# Patient Record
Sex: MALE | Race: WHITE | NOT HISPANIC OR LATINO | Employment: OTHER | URBAN - METROPOLITAN AREA
[De-identification: names, ages, dates, MRNs, and addresses within clinical notes are randomized per-mention and may not be internally consistent; named-entity substitution may affect disease eponyms.]

---

## 2017-01-05 ENCOUNTER — OFFICE VISIT (OUTPATIENT)
Dept: CARDIOLOGY | Facility: CLINIC | Age: 82
End: 2017-01-05
Payer: MEDICARE

## 2017-01-05 VITALS
WEIGHT: 216.63 LBS | HEART RATE: 72 BPM | HEIGHT: 71 IN | BODY MASS INDEX: 30.33 KG/M2 | SYSTOLIC BLOOD PRESSURE: 104 MMHG | DIASTOLIC BLOOD PRESSURE: 60 MMHG

## 2017-01-05 DIAGNOSIS — I50.22 CHRONIC SYSTOLIC CHF (CONGESTIVE HEART FAILURE): ICD-10-CM

## 2017-01-05 DIAGNOSIS — I20.9 ISCHEMIC CHEST PAIN: ICD-10-CM

## 2017-01-05 DIAGNOSIS — I25.118 CORONARY ARTERY DISEASE OF NATIVE ARTERY OF NATIVE HEART WITH STABLE ANGINA PECTORIS: ICD-10-CM

## 2017-01-05 DIAGNOSIS — I42.9 CARDIOMYOPATHY: ICD-10-CM

## 2017-01-05 DIAGNOSIS — I10 ESSENTIAL HYPERTENSION: ICD-10-CM

## 2017-01-05 DIAGNOSIS — I35.0 NONRHEUMATIC AORTIC VALVE STENOSIS: ICD-10-CM

## 2017-01-05 DIAGNOSIS — I47.29 NSVT (NONSUSTAINED VENTRICULAR TACHYCARDIA): Primary | ICD-10-CM

## 2017-01-05 DIAGNOSIS — N18.30 CKD (CHRONIC KIDNEY DISEASE) STAGE 3, GFR 30-59 ML/MIN: ICD-10-CM

## 2017-01-05 PROCEDURE — 1126F AMNT PAIN NOTED NONE PRSNT: CPT | Mod: S$GLB,,, | Performed by: PHYSICIAN ASSISTANT

## 2017-01-05 PROCEDURE — 1160F RVW MEDS BY RX/DR IN RCRD: CPT | Mod: S$GLB,,, | Performed by: PHYSICIAN ASSISTANT

## 2017-01-05 PROCEDURE — 99214 OFFICE O/P EST MOD 30 MIN: CPT | Mod: S$GLB,,, | Performed by: PHYSICIAN ASSISTANT

## 2017-01-05 PROCEDURE — 1157F ADVNC CARE PLAN IN RCRD: CPT | Mod: S$GLB,,, | Performed by: PHYSICIAN ASSISTANT

## 2017-01-05 PROCEDURE — 1159F MED LIST DOCD IN RCRD: CPT | Mod: S$GLB,,, | Performed by: PHYSICIAN ASSISTANT

## 2017-01-05 PROCEDURE — 99999 PR PBB SHADOW E&M-EST. PATIENT-LVL IV: CPT | Mod: PBBFAC,,, | Performed by: PHYSICIAN ASSISTANT

## 2017-01-05 RX ORDER — BUMETANIDE 2 MG/1
2 TABLET ORAL DAILY
Qty: 60 TABLET | Refills: 6 | Status: ON HOLD | OUTPATIENT
Start: 2017-01-05 | End: 2017-06-30

## 2017-01-05 RX ORDER — ATORVASTATIN CALCIUM 20 MG/1
20 TABLET, FILM COATED ORAL NIGHTLY
Qty: 30 TABLET | Refills: 6 | Status: SHIPPED | OUTPATIENT
Start: 2017-01-05 | End: 2018-01-30 | Stop reason: SDUPTHER

## 2017-01-05 NOTE — MR AVS SNAPSHOT
[facility]  [address] [facility] [address]  Phone: [phone]  Fax: [phone]                  Rajiv Russo   2017 8:00 AM   Office Visit    Description:  Male : 3/15/1919   Provider:  EDITH Dao   Department:  [facility]           Reason for Visit     Coronary Artery Disease     Congestive Heart Failure           Diagnoses this Visit        Comments    NSVT (nonsustained ventricular tachycardia)    -  Primary     Ischemic chest pain         Coronary artery disease of native artery of native heart with stable angina pectoris         Cardiomyopathy         Chronic systolic CHF (congestive heart failure)         Essential hypertension         Nonrheumatic aortic valve stenosis         CKD (chronic kidney disease) stage 3, GFR 30-59 ml/min                To Do List           Future Appointments        Provider Department Dept Phone    2017 10:00 AM SAVANNA Lael MD [facility] Op[facility]mology [phone]    2017 10:30 AM Bertram Estevez MD [facility] [phone]      Goals (5 Years of Data)     None       These Medications        Disp Refills Start End    bumetanide (BUMEX) 2 MG tablet 60 tablet 6 2017     Take 1 tablet (2 mg total) by mouth once daily. - Oral    Pharmacy: [facility] [address] [facility] Ph #: [phone]       atorvastatin (LIPITOR) 20 MG tablet 30 tablet 6 2017    Take 1 tablet (20 mg total) by mouth every evening. - Oral    Pharmacy: [facility] [address] [facility] Ph #: [phone]         [facility] On [facility]     [facility] -  Assistance  Registered nurses in the [facility] provide clinical advisement, health education, appointment booking, and other advisory services.  Call for this free service at [phone].             Medications           Message regarding Medications     Verify the changes and/or  "additions to your medication regime listed below are the same as discussed with your clinician today.  If any of these changes or additions are incorrect, please notify your healthcare provider.        CHANGE how you are taking these medications     Start Taking Instead of    bumetanide (BUMEX) 2 MG tablet bumetanide (BUMEX) 2 MG tablet    Dosage:  Take 1 tablet (2 mg total) by mouth once daily. Dosage:  Take 2 mg by mouth once daily.    Reason for Change:  Reorder            Verify that the below list of medications is an accurate representation of the medications you are currently taking.  If none reported, the list may be blank. If incorrect, please contact your healthcare provider. Carry this list with you in case of emergency.           Current Medications     aspirin (ECOTRIN) 81 MG EC tablet Take 81 mg by mouth once daily.    atorvastatin (LIPITOR) 20 MG tablet Take 1 tablet (20 mg total) by mouth every evening.    bumetanide (BUMEX) 2 MG tablet Take 1 tablet (2 mg total) by mouth once daily.    cyanocobalamin 1,000 mcg/mL injection     fenofibrate 160 MG Tab Take 160 mg by mouth once daily.    glipiZIDE (GLUCOTROL) 2.5 MG TR24 Take 2.5 mg by mouth 2 (two) times daily with meals.     insulin glargine (LANTUS) 100 unit/mL injection Inject 15 Units into the skin 2 (two) times daily.     losartan (COZAAR) 25 MG tablet Take 0.5 tablets (12.5 mg total) by mouth once daily.    metoprolol succinate (TOPROL-XL) 25 MG 24 hr tablet Take 1 tablet (25 mg total) by mouth every evening.    nitroGLYCERIN (NITROSTAT) 0.4 MG SL tablet Place 1 tablet (0.4 mg total) under the tongue every 5 (five) minutes as needed for Chest pain.    potassium chloride SA (K-DUR,KLOR-CON) 10 MEQ tablet Take 10 mEq by mouth once.    tamsulosin (FLOMAX) 0.4 mg Cp24 Take 0.4 mg by mouth.    amiodarone (PACERONE) 200 MG Tab Take 1 tablet (200 mg total) by mouth 2 (two) times daily.    BD INSULIN PEN NEEDLE UF MINI 31 gauge x 3/16" Ndle          " "  Clinical Reference Information           Vital Signs - Last Recorded  Most recent update: 1/5/2017  8:13 AM by Giovany Staples    BP Pulse Ht Wt BMI    104/60 (BP Location: Left arm, Patient Position: Sitting, BP Method: Manual) 72 5' 11" (1.803 m) 98.2 kg (216 lb 9.6 oz) 30.21 kg/m2      Blood Pressure          Most Recent Value    BP  104/60      Allergies as of 1/5/2017     No Known Allergies      Immunizations Administered on Date of Encounter - 1/5/2017     None      Orders Placed During Today's Visit     Future Labs/Procedures Expected by Expires    Basic metabolic panel  4/5/2017 3/6/2018      "

## 2017-01-05 NOTE — PROGRESS NOTES
Subjective:    Patient ID:  Rajiv Russo is a 89 y.o. male who presents for follow-up of Coronary Artery Disease (eval amiodarone levels) and Congestive Heart Failure      HPI   Mr. Russo is a 89 year old patient with a PMHx of type II DM, CAD, old MI, bradycardia s/p PPM, CKD stage III, bladder, and prostate CA who presents today for follow-up. Patient previously seen by Dr. Sanchez and complained of a near syncopal episode. PPM interrogation showed run of NSVT and patient was subsequently started on amiodarone. He returns today and states he is doing well. No real complaints. Still having occasional angina, no recent episodes. No recent exertional signs/syptoms. Last sublingual nitro usage approximately one month ago. CHF wise, patient appears stable. No issues. Denies any worsening SOB, PND, orthopnea, or abdominal bloating. Lower extremity edema is stable. Patient reports compliance with his medications. He did not start amiodarone due to concern over side effects. No further near syncopal episodes.      As referenced in previous notes, patient and family have elected conservative management of AS/angina. For now, he is still on board with conservative management.     Review of Systems   Constitution: Negative for chills, decreased appetite, fever, weakness and malaise/fatigue.   HENT: Negative for congestion, headaches, hoarse voice and sore throat.    Eyes: Negative for blurred vision and discharge.   Cardiovascular: Positive for chest pain (stable angina) and leg swelling. Negative for claudication, cyanosis, dyspnea on exertion, irregular heartbeat, near-syncope, orthopnea, palpitations and paroxysmal nocturnal dyspnea.   Respiratory: Negative for cough, hemoptysis, shortness of breath, snoring, sputum production and wheezing.    Endocrine: Negative for cold intolerance and heat intolerance.   Hematologic/Lymphatic: Negative for bleeding problem. Does not bruise/bleed easily.   Skin: Negative for rash.  "  Musculoskeletal: Positive for arthritis and joint pain. Negative for back pain, joint swelling, muscle cramps, muscle weakness and myalgias.   Gastrointestinal: Negative for abdominal pain, constipation, diarrhea, heartburn, melena and nausea.   Genitourinary: Negative for hematuria.   Neurological: Negative for dizziness, focal weakness, light-headedness, loss of balance, numbness, paresthesias and seizures.   Psychiatric/Behavioral: Negative for memory loss. The patient does not have insomnia.    Allergic/Immunologic: Negative for hives.         Visit Vitals    /60 (BP Location: Left arm, Patient Position: Sitting, BP Method: Manual)    Pulse 72    Ht 5' 11" (1.803 m)    Wt 98.2 kg (216 lb 9.6 oz)    BMI 30.21 kg/m2       Objective:    Physical Exam   Constitutional: He is oriented to person, place, and time. He appears well-developed and well-nourished. No distress.   HENT:   Head: Normocephalic and atraumatic.   Eyes: Pupils are equal, round, and reactive to light. Right eye exhibits no discharge. Left eye exhibits no discharge.   Neck: Neck supple. No JVD present. No tracheal deviation present. No thyromegaly present.   Cardiovascular: Normal rate, regular rhythm, S1 normal and S2 normal.  PMI is not displaced.  Exam reveals no gallop, no S3, no S4 and no friction rub.    Murmur heard.   Harsh midsystolic murmur is present  at the upper right sternal border radiating to the neck  Pulses:       Radial pulses are 2+ on the right side, and 2+ on the left side.   Pulmonary/Chest: Effort normal and breath sounds normal. No respiratory distress. He has no wheezes. He has no rales.   Abdominal: Soft. He exhibits no distension. There is no tenderness. There is no rebound.   Musculoskeletal: He exhibits edema (1+ BLE kaylie).   Neurological: He is alert and oriented to person, place, and time.   Skin: Skin is warm and dry. He is not diaphoretic. No erythema.   Psychiatric: He has a normal mood and affect. His " behavior is normal.   Nursing note and vitals reviewed.       2D Echo CONCLUSIONS     1 - Moderate left atrial enlargement.     2 - Concentric remodeling.     3 - Mildly to moderately depressed left ventricular systolic function (EF 40-45%).     4 - Normal right ventricular systolic function .     5 - The estimated PA systolic pressure is greater than 26 mmHg.     6 - Moderate to severe aortic stenosis, KASSIDY = 0.8 cm2, mean gradient = 18.0 mmHg.     7 - Trivial aortic regurgitation.     8 - Moderate left atrial enlargement.     9 - Concentric remodeling.     10 - Mildly to moderately depressed left ventricular systolic function (EF 40-45%).     11 - Normal right ventricular systolic function .     12 - The estimated PA systolic pressure is greater than 26 mmHg.     13 - Moderate to severe aortic stenosis, KASSIDY = 0.8 cm2, mean gradient = 18.0 mmHg.     14 - Trivial aortic regurgitation.       Chemistry        Component Value Date/Time     12/22/2016 1408    K 4.1 12/22/2016 1408     12/22/2016 1408    CO2 30 (H) 12/22/2016 1408    BUN 30 12/22/2016 1408    CREATININE 1.5 (H) 12/22/2016 1408     (H) 12/22/2016 1408        Component Value Date/Time    CALCIUM 9.3 12/22/2016 1408    ALKPHOS 46 (L) 08/04/2016 1046    AST 19 08/04/2016 1046    ALT 10 08/04/2016 1046    BILITOT 0.7 08/04/2016 1046          Assessment:       1. NSVT (nonsustained ventricular tachycardia)    2. Ischemic chest pain    3. Coronary artery disease of native artery of native heart with stable angina pectoris    4. Cardiomyopathy    5. Chronic systolic CHF (congestive heart failure)    6. Essential hypertension    7. Nonrheumatic aortic valve stenosis    8. CKD (chronic kidney disease) stage 3, GFR 30-59 ml/min       Doing clinically well. Does not want to take amiodarone, concerned about side effects. BP is borderline so increasing BB not an option at this point. For now, will continue to monitor patient on current regimen. He  will call if he experiences any other episodes. Continue same meds. Salt compliance emphasized.   Plan:     -Continue current medical management and risk factor modification  -Cardiac, low salt diet  -Notify office if any further near syncopal episodes  -RTC with PA in 3 months with BMP same day         Chart reviewed. Dr. Sanchez agrees with plan as outlined above.         Chart reviewed. Dr. Sanchez agrees with plan as outlined above.

## 2017-01-18 ENCOUNTER — TELEPHONE (OUTPATIENT)
Dept: NEPHROLOGY | Facility: CLINIC | Age: 82
End: 2017-01-18

## 2017-01-18 NOTE — TELEPHONE ENCOUNTER
----- Message from Rafaela Verdugo sent at 1/18/2017  1:36 PM CST -----  Contact: pt daughter-   Patient daughter calling to speak to  Nurse. States the patient is scheduled for an appt on 1/30 and wants to know if patient need lab work done before appt. Please adv/call [phone].//thanks. cw

## 2017-01-18 NOTE — TELEPHONE ENCOUNTER
Returned call to patient's daughter who wants to know does patient need lab work. Left a detailed message informing that he will need blood work the week before. The orders are in the system.

## 2017-01-18 NOTE — TELEPHONE ENCOUNTER
Patient's daughter Elena returned the call. She is requesting that patient have labs done by his home health. Asked her to provided the name of the company and fax or phone number. She states that she's not home and don't know either. Advised her to call back with it so that I can fax the orders over. She expressed understanding.

## 2017-01-25 ENCOUNTER — TELEPHONE (OUTPATIENT)
Dept: CARDIOLOGY | Facility: CLINIC | Age: 82
End: 2017-01-25

## 2017-01-25 NOTE — TELEPHONE ENCOUNTER
----- Message from EDITH Dao sent at 1/25/2017  7:43 AM CST -----  Yes, pulmonary would have to address or his primary care. We do not sign oxygen orders    Thanks

## 2017-03-09 ENCOUNTER — TELEPHONE (OUTPATIENT)
Dept: CARDIOLOGY | Facility: CLINIC | Age: 82
End: 2017-03-09

## 2017-03-09 NOTE — TELEPHONE ENCOUNTER
----- Message from Radha Zaragoza sent at 3/9/2017  1:23 PM CST -----  Contact: maty Pereira [phone]  States she is calling to schedule pt's pacemaker appt and can be reached at [phone]//[email or web address]

## 2017-03-14 ENCOUNTER — HOSPITAL ENCOUNTER (INPATIENT)
Facility: HOSPITAL | Age: 82
LOS: 1 days | Discharge: HOME | DRG: 603 | End: 2017-03-17
Attending: EMERGENCY MEDICINE | Admitting: INTERNAL MEDICINE
Payer: MEDICARE

## 2017-03-14 DIAGNOSIS — L03.90 CELLULITIS: ICD-10-CM

## 2017-03-14 DIAGNOSIS — I42.9 CARDIOMYOPATHY: ICD-10-CM

## 2017-03-14 DIAGNOSIS — L03.115 CELLULITIS OF RIGHT LOWER EXTREMITY: ICD-10-CM

## 2017-03-14 DIAGNOSIS — I95.9 HYPOTENSION: ICD-10-CM

## 2017-03-14 DIAGNOSIS — M79.604 LOWER EXTREMITY PAIN, RIGHT: ICD-10-CM

## 2017-03-14 DIAGNOSIS — R79.89 ELEVATED TROPONIN: Primary | ICD-10-CM

## 2017-03-14 PROBLEM — L03.119 CELLULITIS OF LEG: Status: ACTIVE | Noted: 2017-03-14

## 2017-03-14 LAB
ALBUMIN SERPL BCP-MCNC: 3.4 G/DL
ALP SERPL-CCNC: 44 U/L
ALT SERPL W/O P-5'-P-CCNC: 7 U/L
ANION GAP SERPL CALC-SCNC: 8 MMOL/L
ANION GAP SERPL CALC-SCNC: 8 MMOL/L
APTT BLDCRRT: 28.1 SEC
AST SERPL-CCNC: 15 U/L
BASOPHILS # BLD AUTO: 0.01 K/UL
BASOPHILS NFR BLD: 0.1 %
BILIRUB SERPL-MCNC: 0.6 MG/DL
BNP SERPL-MCNC: 656 PG/ML
BUN SERPL-MCNC: 28 MG/DL
BUN SERPL-MCNC: 29 MG/DL
CALCIUM SERPL-MCNC: 8.6 MG/DL
CALCIUM SERPL-MCNC: 8.7 MG/DL
CHLORIDE SERPL-SCNC: 100 MMOL/L
CHLORIDE SERPL-SCNC: 98 MMOL/L
CO2 SERPL-SCNC: 32 MMOL/L
CO2 SERPL-SCNC: 33 MMOL/L
CREAT SERPL-MCNC: 1.7 MG/DL
CREAT SERPL-MCNC: 1.9 MG/DL
DIFFERENTIAL METHOD: ABNORMAL
EOSINOPHIL # BLD AUTO: 0.1 K/UL
EOSINOPHIL NFR BLD: 0.5 %
ERYTHROCYTE [DISTWIDTH] IN BLOOD BY AUTOMATED COUNT: 14.3 %
EST. GFR  (AFRICAN AMERICAN): 33 ML/MIN/1.73 M^2
EST. GFR  (AFRICAN AMERICAN): 38 ML/MIN/1.73 M^2
EST. GFR  (NON AFRICAN AMERICAN): 29 ML/MIN/1.73 M^2
EST. GFR  (NON AFRICAN AMERICAN): 33 ML/MIN/1.73 M^2
GLUCOSE SERPL-MCNC: 236 MG/DL
GLUCOSE SERPL-MCNC: 311 MG/DL
HCT VFR BLD AUTO: 37.2 %
HGB BLD-MCNC: 11.8 G/DL
INR PPP: 1.1
LACTATE SERPL-SCNC: 1.7 MMOL/L
LYMPHOCYTES # BLD AUTO: 0.9 K/UL
LYMPHOCYTES NFR BLD: 9.4 %
MCH RBC QN AUTO: 28.9 PG
MCHC RBC AUTO-ENTMCNC: 31.7 %
MCV RBC AUTO: 91 FL
MONOCYTES # BLD AUTO: 0.9 K/UL
MONOCYTES NFR BLD: 9.9 %
NEUTROPHILS # BLD AUTO: 7.5 K/UL
NEUTROPHILS NFR BLD: 80.1 %
PLATELET # BLD AUTO: 175 K/UL
PMV BLD AUTO: 10.1 FL
POTASSIUM SERPL-SCNC: 3.7 MMOL/L
POTASSIUM SERPL-SCNC: 3.9 MMOL/L
PROT SERPL-MCNC: 6.7 G/DL
PROTHROMBIN TIME: 11.4 SEC
RBC # BLD AUTO: 4.08 M/UL
SODIUM SERPL-SCNC: 139 MMOL/L
SODIUM SERPL-SCNC: 140 MMOL/L
TROPONIN I SERPL DL<=0.01 NG/ML-MCNC: 0.51 NG/ML
TROPONIN I SERPL DL<=0.01 NG/ML-MCNC: 0.54 NG/ML
WBC # BLD AUTO: 9.36 K/UL

## 2017-03-14 PROCEDURE — 83605 ASSAY OF LACTIC ACID: CPT

## 2017-03-14 PROCEDURE — 96365 THER/PROPH/DIAG IV INF INIT: CPT

## 2017-03-14 PROCEDURE — 80053 COMPREHEN METABOLIC PANEL: CPT

## 2017-03-14 PROCEDURE — 99285 EMERGENCY DEPT VISIT HI MDM: CPT | Mod: 25

## 2017-03-14 PROCEDURE — 80048 BASIC METABOLIC PNL TOTAL CA: CPT

## 2017-03-14 PROCEDURE — 84484 ASSAY OF TROPONIN QUANT: CPT

## 2017-03-14 PROCEDURE — 87040 BLOOD CULTURE FOR BACTERIA: CPT | Mod: 59

## 2017-03-14 PROCEDURE — 85730 THROMBOPLASTIN TIME PARTIAL: CPT

## 2017-03-14 PROCEDURE — 96366 THER/PROPH/DIAG IV INF ADDON: CPT

## 2017-03-14 PROCEDURE — 94640 AIRWAY INHALATION TREATMENT: CPT

## 2017-03-14 PROCEDURE — 25000003 PHARM REV CODE 250: Performed by: EMERGENCY MEDICINE

## 2017-03-14 PROCEDURE — G0378 HOSPITAL OBSERVATION PER HR: HCPCS

## 2017-03-14 PROCEDURE — 85610 PROTHROMBIN TIME: CPT

## 2017-03-14 PROCEDURE — 85025 COMPLETE CBC W/AUTO DIFF WBC: CPT

## 2017-03-14 PROCEDURE — 84484 ASSAY OF TROPONIN QUANT: CPT | Mod: 91

## 2017-03-14 PROCEDURE — 93005 ELECTROCARDIOGRAM TRACING: CPT

## 2017-03-14 PROCEDURE — 25000242 PHARM REV CODE 250 ALT 637 W/ HCPCS: Performed by: NURSE PRACTITIONER

## 2017-03-14 PROCEDURE — 36415 COLL VENOUS BLD VENIPUNCTURE: CPT

## 2017-03-14 PROCEDURE — 93010 ELECTROCARDIOGRAM REPORT: CPT | Mod: ,,, | Performed by: INTERNAL MEDICINE

## 2017-03-14 PROCEDURE — 83880 ASSAY OF NATRIURETIC PEPTIDE: CPT

## 2017-03-14 RX ORDER — INSULIN ASPART 100 [IU]/ML
0-5 INJECTION, SOLUTION INTRAVENOUS; SUBCUTANEOUS
Status: DISCONTINUED | OUTPATIENT
Start: 2017-03-15 | End: 2017-03-17 | Stop reason: HOSPADM

## 2017-03-14 RX ORDER — ASPIRIN 81 MG/1
81 TABLET ORAL DAILY
Status: DISCONTINUED | OUTPATIENT
Start: 2017-03-15 | End: 2017-03-17 | Stop reason: HOSPADM

## 2017-03-14 RX ORDER — GLUCAGON 1 MG
1 KIT INJECTION
Status: DISCONTINUED | OUTPATIENT
Start: 2017-03-15 | End: 2017-03-17 | Stop reason: HOSPADM

## 2017-03-14 RX ORDER — IBUPROFEN 200 MG
16 TABLET ORAL
Status: DISCONTINUED | OUTPATIENT
Start: 2017-03-15 | End: 2017-03-17 | Stop reason: HOSPADM

## 2017-03-14 RX ORDER — TAMSULOSIN HYDROCHLORIDE 0.4 MG/1
0.4 CAPSULE ORAL DAILY
Status: DISCONTINUED | OUTPATIENT
Start: 2017-03-15 | End: 2017-03-17 | Stop reason: HOSPADM

## 2017-03-14 RX ORDER — METOPROLOL SUCCINATE 25 MG/1
25 TABLET, EXTENDED RELEASE ORAL NIGHTLY
Status: DISCONTINUED | OUTPATIENT
Start: 2017-03-15 | End: 2017-03-17 | Stop reason: HOSPADM

## 2017-03-14 RX ORDER — GUAIFENESIN 600 MG/1
600 TABLET, EXTENDED RELEASE ORAL 2 TIMES DAILY
Status: DISCONTINUED | OUTPATIENT
Start: 2017-03-15 | End: 2017-03-17 | Stop reason: HOSPADM

## 2017-03-14 RX ORDER — AMIODARONE HYDROCHLORIDE 200 MG/1
200 TABLET ORAL 2 TIMES DAILY
Status: DISCONTINUED | OUTPATIENT
Start: 2017-03-15 | End: 2017-03-17 | Stop reason: HOSPADM

## 2017-03-14 RX ORDER — ASPIRIN 325 MG
325 TABLET ORAL
Status: COMPLETED | OUTPATIENT
Start: 2017-03-14 | End: 2017-03-14

## 2017-03-14 RX ORDER — ACETAMINOPHEN 325 MG/1
650 TABLET ORAL EVERY 6 HOURS PRN
Status: DISCONTINUED | OUTPATIENT
Start: 2017-03-15 | End: 2017-03-17 | Stop reason: HOSPADM

## 2017-03-14 RX ORDER — CLINDAMYCIN PHOSPHATE 600 MG/50ML
600 INJECTION, SOLUTION INTRAVENOUS
Status: DISCONTINUED | OUTPATIENT
Start: 2017-03-15 | End: 2017-03-14

## 2017-03-14 RX ORDER — CLINDAMYCIN PHOSPHATE 600 MG/50ML
600 INJECTION, SOLUTION INTRAVENOUS
Status: COMPLETED | OUTPATIENT
Start: 2017-03-14 | End: 2017-03-14

## 2017-03-14 RX ORDER — ATORVASTATIN CALCIUM 10 MG/1
20 TABLET, FILM COATED ORAL NIGHTLY
Status: DISCONTINUED | OUTPATIENT
Start: 2017-03-15 | End: 2017-03-17 | Stop reason: HOSPADM

## 2017-03-14 RX ORDER — IBUPROFEN 200 MG
24 TABLET ORAL
Status: DISCONTINUED | OUTPATIENT
Start: 2017-03-15 | End: 2017-03-17 | Stop reason: HOSPADM

## 2017-03-14 RX ORDER — IPRATROPIUM BROMIDE AND ALBUTEROL SULFATE 2.5; .5 MG/3ML; MG/3ML
3 SOLUTION RESPIRATORY (INHALATION) EVERY 6 HOURS
Status: DISCONTINUED | OUTPATIENT
Start: 2017-03-14 | End: 2017-03-17 | Stop reason: HOSPADM

## 2017-03-14 RX ADMIN — CLINDAMYCIN IN 5 PERCENT DEXTROSE 600 MG: 12 INJECTION, SOLUTION INTRAVENOUS at 08:03

## 2017-03-14 RX ADMIN — IPRATROPIUM BROMIDE AND ALBUTEROL SULFATE 3 ML: .5; 3 SOLUTION RESPIRATORY (INHALATION) at 11:03

## 2017-03-14 RX ADMIN — ASPIRIN 325 MG ORAL TABLET 325 MG: 325 PILL ORAL at 10:03

## 2017-03-14 RX ADMIN — SODIUM CHLORIDE 1000 ML: 0.9 INJECTION, SOLUTION INTRAVENOUS at 08:03

## 2017-03-14 NOTE — IP AVS SNAPSHOT
[address] [place] [address]           Patient Discharge Instructions     Our goal is to set you up for success. This packet includes information on your condition, medications, and your home care. It will help you to care for yourself so you don't get sicker and need to go back to the hospital.     Please ask your nurse if you have any questions.        There are many details to remember when preparing to leave the hospital. Here is what you will need to do:    1. Take your medicine. If you are prescribed medications, review your Medication List in the following pages. You may have new medications to  at the pharmacy and others that you'll need to stop taking. Review the instructions for how and when to take your medications. Talk with your doctor or nurses if you are unsure of what to do.     2. Go to your follow-up appointments. Specific follow-up information is listed in the following pages. Your may be contacted by a transition nurse or clinical provider about future appointments. Be sure we have all of the phone numbers to reach you, if needed. Please contact your provider's office if you are unable to make an appointment.     3. Watch for warning signs. Your doctor or nurse will give you detailed warning signs to watch for and when to call for assistance. These instructions may also include educational information about your condition. If you experience any of warning signs to your health, call your doctor.               ** Verify the list of medication(s) below is accurate and up to date. Carry this with you in case of emergency. If your medications have changed, please notify your healthcare provider.             Medication List      START taking these medications        Additional Info                      linezolid 600 mg Tab   Commonly known as:  ZYVOX   Quantity:  20 tablet   Refills:  0   Dose:  600 mg   Indications:  Skin & soft tissue     "Instructions:  Take 1 tablet (600 mg total) by mouth every 12 (twelve) hours.     Begin Date    AM    Noon    PM    Bedtime       oxycodone-acetaminophen 5-325 mg per tablet   Commonly known as:  PERCOCET   Quantity:  21 tablet   Refills:  0   Dose:  1 tablet    Last time this was given:  1 tablet on 3/17/2017  8:45 AM   Instructions:  Take 1 tablet by mouth every 6 (six) hours as needed.     Begin Date    AM    Noon    PM    Bedtime         CONTINUE taking these medications        Additional Info                      amiodarone 200 MG Tab   Commonly known as:  PACERONE   Quantity:  60 tablet   Refills:  11   Dose:  200 mg    Last time this was given:  200 mg on 3/17/2017  8:45 AM   Instructions:  Take 1 tablet (200 mg total) by mouth 2 (two) times daily.     Begin Date    AM    Noon    PM    Bedtime       aspirin 81 MG EC tablet   Commonly known as:  ECOTRIN   Refills:  0   Dose:  81 mg    Last time this was given:  81 mg on 3/17/2017  8:45 AM   Instructions:  Take 81 mg by mouth once daily.     Begin Date    AM    Noon    PM    Bedtime       atorvastatin 20 MG tablet   Commonly known as:  LIPITOR   Quantity:  30 tablet   Refills:  6   Dose:  20 mg    Last time this was given:  20 mg on 3/16/2017  8:35 PM   Instructions:  Take 1 tablet (20 mg total) by mouth every evening.     Begin Date    AM    Noon    PM    Bedtime       BD INSULIN PEN NEEDLE UF MINI 31 gauge x 3/16" Ndle   Refills:  0   Generic drug:  pen needle, diabetic      Begin Date    AM    Noon    PM    Bedtime       bumetanide 2 MG tablet   Commonly known as:  BUMEX   Quantity:  60 tablet   Refills:  6   Dose:  2 mg    Last time this was given:  2 mg on 3/17/2017  8:45 AM   Instructions:  Take 1 tablet (2 mg total) by mouth once daily.     Begin Date    AM    Noon    PM    Bedtime       cyanocobalamin 1,000 mcg/mL injection   Refills:  0      Begin Date    AM    Noon    PM    Bedtime       fenofibrate 160 MG Tab   Refills:  0   Dose:  160 mg    " Instructions:  Take 160 mg by mouth once daily.     Begin Date    AM    Noon    PM    Bedtime       glipiZIDE 2.5 MG Tr24   Commonly known as:  GLUCOTROL   Refills:  0   Dose:  2.5 mg    Instructions:  Take 2.5 mg by mouth 2 (two) times daily with meals.     Begin Date    AM    Noon    PM    Bedtime       insulin glargine 100 unit/mL injection   Commonly known as:  LANTUS   Refills:  0   Dose:  15 Units    Instructions:  Inject 15 Units into the skin 2 (two) times daily.     Begin Date    AM    Noon    PM    Bedtime       losartan 25 MG tablet   Commonly known as:  COZAAR   Quantity:  90 tablet   Refills:  3   Dose:  12.5 mg    Last time this was given:  12.5 mg on 3/17/2017  8:45 AM   Instructions:  Take 0.5 tablets (12.5 mg total) by mouth once daily.     Begin Date    AM    Noon    PM    Bedtime       metoprolol succinate 25 MG 24 hr tablet   Commonly known as:  TOPROL-XL   Quantity:  30 tablet   Refills:  0   Dose:  25 mg    Last time this was given:  25 mg on 3/16/2017  8:35 PM   Instructions:  Take 1 tablet (25 mg total) by mouth every evening.     Begin Date    AM    Noon    PM    Bedtime       nitroGLYCERIN 0.4 MG SL tablet   Commonly known as:  NITROSTAT   Quantity:  30 tablet   Refills:  6   Dose:  0.4 mg    Instructions:  Place 1 tablet (0.4 mg total) under the tongue every 5 (five) minutes as needed for Chest pain.     Begin Date    AM    Noon    PM    Bedtime       potassium chloride SA 10 MEQ tablet   Commonly known as:  K-DUR,KLOR-CON   Refills:  0   Dose:  10 mEq    Instructions:  Take 10 mEq by mouth once.     Begin Date    AM    Noon    PM    Bedtime       tamsulosin 0.4 mg Cp24   Commonly known as:  FLOMAX   Refills:  0   Dose:  0.4 mg    Last time this was given:  0.4 mg on 3/17/2017  8:45 AM   Instructions:  Take 0.4 mg by mouth.     Begin Date    AM    Noon    PM    Bedtime            Where to Get Your Medications      You can get these medications from any pharmacy     Bring a paper  prescription for each of these medications     linezolid 600 mg Tab    oxycodone-acetaminophen 5-325 mg per tablet                  Please bring to all follow up appointments:    1. A copy of your discharge instructions.  2. All medicines you are currently taking in their original bottles.  3. Identification and insurance card.    Please arrive 15 minutes ahead of scheduled appointment time.    Please call 24 hours in advance if you must reschedule your appointment and/or time.        Your Scheduled Appointments     Mar 30, 2017  1:00 PM CDT   Established Patient Visit with EDITH Dao   [facility]    [id number] [place] [id number] [phone]              Follow-up Information     Follow up with Dean Soto Jr, MD.    Specialty:  Family Medicine    Contact information:    [address]  [facility]  [facility] [address] [phone]          Call Wyatt Ledbetter MD.    Specialty:  Infectious Diseases    Contact information:    [address] [place] [address] [phone]          Discharge Instructions     Future Orders    Diet general     Questions:    Total calories:      Fat restriction, if any:      Protein restriction, if any:      Na restriction, if any:      Fluid restriction:      Additional restrictions:          Primary Diagnosis     Your primary diagnosis was:  Not on File      Admission Information     Date & Time Provider Department CSN    3/14/2017  7:25 PM Azucena Dominguez MD [facility] -  [id number]      Care Providers     Provider Role Specialty Primary office phone    Azucena Dominguez MD Attending Provider Internal Medicine [phone]    Azucena Dominguez MD Team Attending  Internal Medicine [phone]    Azucena Dominguez MD Consulting Physician  Internal Medicine  [phone]    Wyatt Ledbetter MD Consulting Physician  Infectious Diseases [phone]    Twin Melgoza MD Consulting Physician   "General Surgery [phone]      Your Vitals Were     BP Pulse Temp Resp Height Weight    110/59 (BP Location: Left arm, Patient Position: Lying, BP Method: Automatic) 85 98.2 °F (36.8 °C) (Oral) 20 5' 11" (1.803 m) 96.2 kg (212 lb)    SpO2 BMI             94% 29.57 kg/m2         Recent Lab Values        4/14/2016 3/15/2017                        5:01 PM  4:44 AM          A1C 8.1 (H) 8.0 (H)          Comment for A1C at  4:44 AM on 3/15/2017:  According to ADA guidelines, hemoglobin A1C <7.0% represents  optimal control in non-pregnant diabetic patients.  Different  metrics may apply to specific populations.   Standards of Medical Care in Diabetes - 2016.  For the purpose of screening for the presence of diabetes:  <5.7%     Consistent with the absence of diabetes  5.7-6.4%  Consistent with increasing risk for diabetes   (prediabetes)  >or=6.5%  Consistent with diabetes  Currently no consensus exists for use of hemoglobin A1C  for diagnosis of diabetes for children.        Pending Labs     Order Current Status    Blood culture Preliminary result    Blood culture Preliminary result      Allergies as of 3/17/2017     No Known Allergies      [facility]     [facility] Care Line - 24/7 Assistance  Unless otherwise directed by your provider, please contact [facility], our nurse care line that is available for 24/7 assistance.     Registered nurses in the [facility] provide clinical advisement, health education, appointment booking, and other advisory services.  Call for this free service at [phone].        Advance Directives     An advance directive is a document which, in the event you are no longer able to make decisions for yourself, tells your healthcare team what kind of treatment you do or do not want to receive, or who you would like to make those decisions for you.  If you do not currently have an advance directive, [facility] encourages you to create one.  For more information " call:  [phone]), [phone] ([phone]),  or log on to [email or web address].        Language Assistance Services     ATTENTION: Language assistance services are available, free of charge. Please call [phone].      ATENCIÓN: Si habla alex, tiene a montgomery disposición servicios gratuitos de asistencia lingüística. Llame al [phone].     CHÚ Ý: N?u b?n nói Ti?ng Vi?t, có các d?ch v? h? tr? ngôn ng? mi?n phí dành cho b?n. G?i s? [phone].        Heart Failure Education       Heart Failure: Being Active  You have a condition called heart failure. Being active doesnt mean that you have to wear yourself out. Even a little movement each day helps to strengthen your heart. If you cant get out to exercise, you can do simple stretching and strengthening exercises at home. These are good ways to keep you well-conditioned and prevent you and your heart from becoming excessively weak.    Ideas to get you started  · Add a little movement to things you do now. Walk to mail letters. Park your car at the far end of the parking lot and walk to the store. Walk up a flight of stairs instead of taking the elevator.  · Choose activities you enjoy. You might walk, swim, or ride an exercise bike. Things like gardening and washing the car count, too. Other possibilities include: washing dishes, walking the dog, walking around the mall, and doing aerobic activities with friends.  · Join a group exercise program at a [facility] or [facility], a senior center, or a community center. Or look into a hospital cardiac rehabilitation program. Ask your doctor if you qualify.  Tips to keep you going  · Get up and get dressed each day. Go to a coffee shop and read a newspaper or go somewhere that you'll be in the presence of other active people. Youll feel more like being active.  · Make a plan. Choose one or more activities that you enjoy and that you can easily do. Then plan to do at least one each day. You might  write your plan on a calendar.  · Go with a friend or a group if you like company. This can help you feel supported and stay motivated, too.  · Plan social events that you enjoy. This will keep you mentally engaged as well as physically motivated to do things you find pleasure in.  For your safety  · Talk with your healthcare provider before starting an exercise program.  · Exercise indoors when its too hot or too cold outside, or when the air quality is poor. Try walking at a shopping mall.  · Wear socks and sturdy shoes to maintain your balance and prevent falls.  · Start slowly. Do a few minutes several times a day at first. Increase your time and speed little by little.  · Stop and rest whenever you feel tired or get short of breath.  · Dont push yourself on days when you dont feel well.  Date Last Reviewed: 3/20/2016  © 2308-9927 [employer]. [address] [place] [address]. All rights reserved. This information is not intended as a substitute for professional medical care. Always follow your healthcare professional's instructions.              Heart Failure: Evaluating Your Heart  You have a condition called heart failure. To evaluate your condition, your doctor will examine you, ask questions, and do some tests. Along with looking for signs of heart failure, the doctor looks for any other health problems that may have led to heart failure. The results of your evaluation will help your doctor form a treatment plan.  Health history and physical exam  Your visit will start with a health history. Tell the doctor about any symptoms youve noticed and about all medicines you take. Then youll have a physical exam. This includes listening to your heartbeat and breathing. Youll also be checked for swelling (edema) in your legs and neck. When you have fluid buildup or fluid in the lungs, it may be called congestive heart failure.  Diagnosing heart failure     During an echocardiogram, sound  waves bounce off the heart. These are converted into a picture on the screen.   The following may be done to help your doctor form a diagnosis:  · X-rays show the size and shape of your heart. These pictures can also show fluid in your lungs.  · An electrocardiogram (ECG or EKG) shows the pattern of your heartbeat. Small pads (electrodes) are placed on your chest, arms, and legs. Wires connect the pads to the ECG machine, which records your hearts electrical signals. This can give the doctor information about heart function.  · An echocardiogram uses ultrasound waves to show the structure and movement of your heart muscle. This shows how well the heart pumps. It also shows the thickness of the heart walls, and if the heart is enlarged. It is one of the most useful, non-invasive tests as it provides information about the heart's general function. This helps your doctor make treatment decisions.  · Lab tests evaluate small amounts of blood or urine for signs of problems. A BNP lab test can help diagnose and evaluate heart failure. BNP stands for B-type natriuretic peptide. The ventricles secrete more BNP when heart failure worsens. Lab tests can also provide information about metabolic dysfunction or heart dysfunction.  Your treatment plan  Based on the results of your evaluation and tests, your doctor will develop a treatment plan. This plan is designed to relieve some of your heart failure symptoms and help make you more comfortable. Your treatment plan may include:  · Medicine to help your heart work better and improve your quality of life  · Changes in what you eat and drink to help prevent fluid from backing up in your body  · Daily monitoring of your weight and heart failure symptoms to see how well your treatment plan is working  · Exercise to help you stay healthy  · Help with quitting smoking  · Emotional and psychological support to help adjust to the changes  · Referrals to other specialists to make sure  you are being treated comprehensively  Date Last Reviewed: 3/21/2016  © 4008-5255 [employer]. [address] [place] [address], [place] [address]. All rights reserved. This information is not intended as a substitute for professional medical care. Always follow your healthcare professional's instructions.              Heart Failure: Making Changes to Your Diet  You have a condition called heart failure. When you have heart failure, excess fluid is more likely to build up in your body because your heart isn't working well. This makes the heart work harder to pump blood. Fluid buildup causes symptoms such as shortness of breath and swelling (edema). This is often referred to as congestive heart failure or CHF. Controlling the amount of salt (sodium) you eat may help stop fluid from building up. Your doctor may also tell you to reduce the amount of fluid you drink.  Reading food labels    Your healthcare provider will tell you how much sodium you can eat each day. Read food labels to keep track. Keep in mind that certain foods are high in salt. These include canned, frozen, and processed foods. Check the amount of sodium in each serving. Watch out for high-sodium ingredients. These include MSG (monosodium glutamate), baking soda, and sodium phosphate.   Eating less salt  Give yourself time to get used to eating less salt. It may take a little while. Here are some tips to help:  · Take the saltshaker off the table. Replace it with salt-free herb mixes and spices.  · Eat fresh or plain frozen vegetables. These have much less salt than canned vegetables.  · Choose low-sodium snacks like sodium-free pretzels, crackers, or air-popped popcorn.  · Dont add salt to your food when youre cooking. Instead, season your foods with pepper, lemon, garlic, or onion.  · When you eat out, ask that your food be cooked without added salt.  · Avoid eating fried foods as these often have a great deal of salt.  If youre told to limit  fluids  You may need to limit how much fluid you have to help prevent swelling. This includes anything that is liquid at room temperature, such as ice cream and soup. If your doctor tells you to limit fluid, try these tips:  · Measure drinks in a measuring cup before you drink them. This will help you meet daily goals.  · Chill drinks to make them more refreshing.  · Suck on frozen lemon wedges to quench thirst.  · Only drink when youre thirsty.  · Chew sugarless gum or suck on hard candy to keep your mouth moist.  · Weigh yourself daily to know if your body's fluid content is rising.  My sodium goal  Your healthcare provider may give you a sodium goal to meet each day. This includes sodium found in food as well as salt that you add. My goal is to eat no more than ___________ mg of sodium per day.     When to call your doctor  Call your doctor right away if you have any symptoms of worsening heart failure. These can include:  · Sudden weight gain  · Increased swelling of your legs or ankles  · Trouble breathing when youre resting or at night  · Increase in the number of pillows you have to sleep on  · Chest pain, pressure, discomfort, or pain in the jaw, neck, or back   Date Last Reviewed: 3/21/2016  © 7554-0720 [employer]. [address] [place] [address], [place] [address]. All rights reserved. This information is not intended as a substitute for professional medical care. Always follow your healthcare professional's instructions.              Heart Failure: Medicines to Help Your Heart    You have a condition called heart failure (also known as congestive heart failure, or CHF). Your doctor will likely prescribe medicines for heart failure and any underlying health problems you have. Most heart failure patients take one or more types of medicinen. Your healthcare provider will work to find the combination of medicines that works best for you.  Heart failure medicines  Here are the most common heart failure  medicines:  · ACE inhibitors lower blood pressure and decrease strain on the heart. This makes it easier for the heart to pump. Angiotensin receptor blockers have similar effects. These are prescribed for some patients instead of ACE inhibitors.  · Beta-blockers relieve stress on the heart. They also improve symptoms. They may also improve the heart's pumping action over time.  · Diuretics (also called water pills) help rid your body of excess water. This can help rid your body of swelling (edema). Having less fluid to pump means your heart doesnt have to work as hard. Some diuretics make your body lose a mineral called potassium. Your doctor will tell you if you need to take supplements or eat more foods high in potassium.  · Digoxin helps your heart pump with more strength. This helps your heart pump more blood with each beat. So, more oxygen-rich blood travels to the rest of the body.  · Aldosterone antagonists help alter hormones and decrease strain on the heart.  · Hydralazine and nitrates are two separate medicines used together to treat heart failure. They may come in one combination pill. They lower blood pressure and decrease how hard the heart has to pump.  Medicines for related conditions  Controlling other heart problems helps keep heart failure under control, too. Depending on other heart problems you have, medicines may be prescribed to:  · Lower blood pressure (antihypertensives).  · Lower cholesterol levels (statins).  · Prevent blood clots (anticoagulants or aspirin).  · Keep the heartbeat steady (antiarrhythmics).  Date Last Reviewed: 3/5/2016  © 7791-1064 [employer] [address] [place] [address], [place] [address]. All rights reserved. This information is not intended as a substitute for professional medical care. Always follow your healthcare professional's instructions.              Heart Failure: Procedures That May Help    The heart is a muscle that pumps oxygen-rich blood to all  parts of the body. When you have heart failure, the heart is not able to pump as well as it should. Blood and fluid may back up into the lungs (congestive heart failure), and some parts of the body dont get enough oxygen-rich blood to work normally. These problems lead to the symptoms of heart failure.     Certain procedures may help the heart pump better in some cases of heart failure. Some procedures are done to treat health problems that may have caused the heart failure such as coronary artery disease or heart rhythm problems. For more serious heart failure, other options are available.  Treating artery and valve problems  If you have coronary artery disease or valve disease, procedures may be done to improve blood flow. This helps the heart pump better, which can improve heart failure symptoms. First, your doctor may do a cardiac catheterization to help detect clogged blood vessels or valve damage. During this procedure, a  thin tube (catheter) in inserted into a blood vessel and guided to the heart. There a dye is injected and a special type of X-ray (angiogram) is taken of the blood vessels. Procedures to open a blocked artery or fix damaged valves can also be done using catheterization.  · Angioplasty uses a balloon-tipped instrument at the end of the catheter. The balloon is inflated to widen the narrowed artery. In many cases, a stent is expanded to further support the narrowed artery. A stent is a metal mesh tube.  · Valve surgery repairs or replacement of faulty valves can also be done during catheterization so blood can flow properly through the chambers of the heart.  Bypass surgery is another option to help treat blocked arteries. It uses a healthy blood vessel from elsewhere in the body. The healthy blood vessel is attached above and below the blocked area so that blood can flow around the blocked artery.  Treating heart rhythm problems  A device may be placed in the chest to help a weak heart  maintain a healthy, heartbeat so the heart can pump more effectively:  · Pacemaker. A pacemaker is an implanted device that regulates your heartbeat electronically. It monitors your heart's rhythm and generates a painless electric impulse that helps the heart beat in a regular rhythm. A pacemaker is programmed to meet your specific heart rhythm needs.  · Biventricular pacing/cardiac resynchronization therapy. A type of pacemaker that paces both pumping chambers of the heart at the same time to coordinate contractions and to improve the heart's function. Some people with heart failure are candidates for this therapy.  · Implantable cardioverter defibrillator. A device similar to a pacemaker that senses when the heart is beating too fast and delivers an electrical shock to convert the fast rhythm to a normal rhythm. This can be a life saving device.  In severe cases  In more serious cases of heart failure when other treatments no longer work, other options may include:  · Ventricular assist devices (VADs). These are mechanical devices used to take over the pumping function for one or both of the heart's ventricles, or pumping chambers. A VAD may be necessary when heart failure progresses to the point that medicines and other treatments no longer help. In some cases, a VAD may be used as a bridge to transplant.  · Heart transplant. This is replacing the diseased heart with a healthy one from a donor. This is an option for a few people who are very sick. A heart transplant is very serious and not an option for all patients. Your doctor can tell you more.  Date Last Reviewed: 3/20/2016  © 2785-9425 [employer]. [address] [place] [address], [place] [address]. All rights reserved. This information is not intended as a substitute for professional medical care. Always follow your healthcare professional's instructions.              Heart Failure: Tracking Your Weight  You have a condition called heart failure. When  you have heart failure, a sudden weight gain or a steady rise in weight is a warning sign that your body is retaining too much water and salt. This could mean your heart failure is getting worse. If left untreated, it can cause problems for your lungs and result in shortness of breath. Weighing yourself each day is the best way to know if youre retaining water. If your weight goes up quickly, call your doctor. You will be given instructions on how to get rid of the excess water. You will likely need medicines and to avoid salt. This will help your heart work better.  Call your doctor if you gain more than 2 pounds in 1 day, more than 5 pounds in 1 week, or whatever weight gain you were told to report by your doctor. This is often a sign of worsening heart failure and needs to be evaluated and treated. Your doctor will tell you what to do next.   Tips for weighing yourself    · Weigh yourself at the same time each morning, wearing the same clothes. Weigh yourself after urinating and before eating.  · Use the same scale each day. Make sure the numbers are easy to read. Put the scale on a flat, hard surface -- not on a rug or carpet.  · Do not stop weighing yourself. If you forget one day, weigh again the next morning.  How to use your weight chart  · Keep your weight chart near the scale. Write your weight on the chart as soon as you get off the scale.  · Fill in the month and the start date on the chart. Then write down your weight each day. Your chart will look like this:    · If you miss a day, leave the space blank. Weigh yourself the next day and write your weight in the next space.  · Take your weight chart with you when you go to see your doctor.  Date Last Reviewed: 3/20/2016  © 6184-7064 [employer]. [address] [place] [address], [place] [address]. All rights reserved. This information is not intended as a substitute for professional medical care. Always follow your healthcare professional's  instructions.              Heart Failure: Warning Signs of a Flare-Up  You have a condition called heart failure. Once you have heart failure, flare-ups can happen. Below are signs that can mean your heart failure is getting worse. If you notice any of these warning signs, call your healthcare provider.  Swelling    · Your feet, ankles, or lower legs get puffier.  · You notice skin changes on your lower legs.  · Your shoes feel too tight.  · Your clothes are tighter in the waist.  · You have trouble getting rings on or off your fingers.  Shortness of breath  · You have to breathe harder even when youre doing your normal activities or when youre resting.  · You are short of breath walking up stairs or even short distances.  · You wake up at night short of breath or coughing.  · You need to use more pillows or sit up to sleep.  · You wake up tired or restless.  Other warning signs  · You feel weaker, dizzy, or more tired.  · You have chest pain or changes in your heartbeat.  · You have a cough that wont go away.  · You cant remember things or dont feel like eating.  Tracking your weight  Gaining weight is often the first warning sign that heart failure is getting worse. Gaining even a few pounds can be a sign that your body is retaining excess water and salt. Weighing yourself each day in the morning after you urinate and before you eat, is the best way to know if you're retaining water. Get a scale that is easy to read and make sure you wear the same clothes and use the same scale every time you weigh. Your healthcare provider will show you how to track your weight. Call your doctor if you gain more than 2 pounds in 1 day, 5 pounds in 1 week, or whatever weight gain you were told to report by your doctor. This is often a sign of worsening heart failure and needs to be evaluated and treated before it compromises your breathing. Your doctor will tell you what to do next.    Date Last Reviewed: 3/15/2016  ©  3616-9259 [employer]. [address] [place] [address], [place] [address]. All rights reserved. This information is not intended as a substitute for professional medical care. Always follow your healthcare professional's instructions.              Chronic Kindey Disease Education             Diabetes Discharge Instructions                                    [facility] complies with applicable Federal civil rights laws and does not discriminate on the basis of race, color, national origin, age, disability, or sex.

## 2017-03-15 LAB
BASOPHILS # BLD AUTO: 0.01 K/UL
BASOPHILS NFR BLD: 0.1 %
DIFFERENTIAL METHOD: ABNORMAL
EOSINOPHIL # BLD AUTO: 0.1 K/UL
EOSINOPHIL NFR BLD: 1.8 %
ERYTHROCYTE [DISTWIDTH] IN BLOOD BY AUTOMATED COUNT: 14.3 %
HCT VFR BLD AUTO: 34.2 %
HGB BLD-MCNC: 10.8 G/DL
LYMPHOCYTES # BLD AUTO: 1.3 K/UL
LYMPHOCYTES NFR BLD: 16.6 %
MCH RBC QN AUTO: 28.7 PG
MCHC RBC AUTO-ENTMCNC: 31.6 %
MCV RBC AUTO: 91 FL
MONOCYTES # BLD AUTO: 0.9 K/UL
MONOCYTES NFR BLD: 11.8 %
NEUTROPHILS # BLD AUTO: 5.4 K/UL
NEUTROPHILS NFR BLD: 69.7 %
PLATELET # BLD AUTO: 155 K/UL
PMV BLD AUTO: 9.6 FL
POCT GLUCOSE: 135 MG/DL (ref 70–110)
POCT GLUCOSE: 163 MG/DL (ref 70–110)
POCT GLUCOSE: 197 MG/DL (ref 70–110)
POCT GLUCOSE: 207 MG/DL (ref 70–110)
POCT GLUCOSE: 254 MG/DL (ref 70–110)
RBC # BLD AUTO: 3.76 M/UL
TROPONIN I SERPL DL<=0.01 NG/ML-MCNC: 0.39 NG/ML
WBC # BLD AUTO: 7.7 K/UL

## 2017-03-15 PROCEDURE — 84484 ASSAY OF TROPONIN QUANT: CPT

## 2017-03-15 PROCEDURE — 25000242 PHARM REV CODE 250 ALT 637 W/ HCPCS: Performed by: NURSE PRACTITIONER

## 2017-03-15 PROCEDURE — 85025 COMPLETE CBC W/AUTO DIFF WBC: CPT

## 2017-03-15 PROCEDURE — 94640 AIRWAY INHALATION TREATMENT: CPT

## 2017-03-15 PROCEDURE — 36415 COLL VENOUS BLD VENIPUNCTURE: CPT

## 2017-03-15 PROCEDURE — 96368 THER/DIAG CONCURRENT INF: CPT

## 2017-03-15 PROCEDURE — 96372 THER/PROPH/DIAG INJ SC/IM: CPT

## 2017-03-15 PROCEDURE — G0378 HOSPITAL OBSERVATION PER HR: HCPCS

## 2017-03-15 PROCEDURE — 63600175 PHARM REV CODE 636 W HCPCS: Performed by: NURSE PRACTITIONER

## 2017-03-15 PROCEDURE — 94760 N-INVAS EAR/PLS OXIMETRY 1: CPT

## 2017-03-15 PROCEDURE — 83036 HEMOGLOBIN GLYCOSYLATED A1C: CPT

## 2017-03-15 PROCEDURE — 63600175 PHARM REV CODE 636 W HCPCS: Performed by: EMERGENCY MEDICINE

## 2017-03-15 PROCEDURE — 25000003 PHARM REV CODE 250: Performed by: NURSE PRACTITIONER

## 2017-03-15 PROCEDURE — 96367 TX/PROPH/DG ADDL SEQ IV INF: CPT

## 2017-03-15 PROCEDURE — 82962 GLUCOSE BLOOD TEST: CPT

## 2017-03-15 PROCEDURE — 25000003 PHARM REV CODE 250: Performed by: EMERGENCY MEDICINE

## 2017-03-15 RX ORDER — BUMETANIDE 1 MG/1
2 TABLET ORAL DAILY
Status: DISCONTINUED | OUTPATIENT
Start: 2017-03-15 | End: 2017-03-17 | Stop reason: HOSPADM

## 2017-03-15 RX ADMIN — PIPERACILLIN SODIUM AND TAZOBACTAM SODIUM 4.5 G: 4; .5 INJECTION, POWDER, LYOPHILIZED, FOR SOLUTION INTRAVENOUS at 12:03

## 2017-03-15 RX ADMIN — PANTOPRAZOLE SODIUM 600 MG: 40 TABLET, DELAYED RELEASE ORAL at 08:03

## 2017-03-15 RX ADMIN — BUMETANIDE 2 MG: 1 TABLET ORAL at 09:03

## 2017-03-15 RX ADMIN — INSULIN ASPART 3 UNITS: 100 INJECTION, SOLUTION INTRAVENOUS; SUBCUTANEOUS at 04:03

## 2017-03-15 RX ADMIN — LOSARTAN POTASSIUM 12.5 MG: 25 TABLET, FILM COATED ORAL at 09:03

## 2017-03-15 RX ADMIN — VANCOMYCIN HYDROCHLORIDE 1250 MG: 1 INJECTION, POWDER, LYOPHILIZED, FOR SOLUTION INTRAVENOUS at 12:03

## 2017-03-15 RX ADMIN — ASPIRIN 81 MG: 81 TABLET, COATED ORAL at 09:03

## 2017-03-15 RX ADMIN — IPRATROPIUM BROMIDE AND ALBUTEROL SULFATE 3 ML: .5; 3 SOLUTION RESPIRATORY (INHALATION) at 07:03

## 2017-03-15 RX ADMIN — PANTOPRAZOLE SODIUM 600 MG: 40 TABLET, DELAYED RELEASE ORAL at 09:03

## 2017-03-15 RX ADMIN — ACETAMINOPHEN 650 MG: 325 TABLET ORAL at 03:03

## 2017-03-15 RX ADMIN — ATORVASTATIN CALCIUM 20 MG: 10 TABLET, FILM COATED ORAL at 08:03

## 2017-03-15 RX ADMIN — INSULIN ASPART 1 UNITS: 100 INJECTION, SOLUTION INTRAVENOUS; SUBCUTANEOUS at 12:03

## 2017-03-15 RX ADMIN — INSULIN DETEMIR 10 UNITS: 100 INJECTION, SOLUTION SUBCUTANEOUS at 08:03

## 2017-03-15 RX ADMIN — INSULIN DETEMIR 10 UNITS: 100 INJECTION, SOLUTION SUBCUTANEOUS at 12:03

## 2017-03-15 RX ADMIN — IPRATROPIUM BROMIDE AND ALBUTEROL SULFATE 3 ML: .5; 3 SOLUTION RESPIRATORY (INHALATION) at 12:03

## 2017-03-15 RX ADMIN — INSULIN DETEMIR 10 UNITS: 100 INJECTION, SOLUTION SUBCUTANEOUS at 09:03

## 2017-03-15 RX ADMIN — ACETAMINOPHEN 650 MG: 325 TABLET ORAL at 09:03

## 2017-03-15 RX ADMIN — PIPERACILLIN SODIUM AND TAZOBACTAM SODIUM 4.5 G: 4; .5 INJECTION, POWDER, LYOPHILIZED, FOR SOLUTION INTRAVENOUS at 01:03

## 2017-03-15 RX ADMIN — AMIODARONE HYDROCHLORIDE 200 MG: 200 TABLET ORAL at 09:03

## 2017-03-15 RX ADMIN — TAMSULOSIN HYDROCHLORIDE 0.4 MG: 0.4 CAPSULE ORAL at 09:03

## 2017-03-15 NOTE — ED NOTES
"Patient's right shin appears swollen, red, and flaky.  Patient had placed some "black salve" and a gauze dressing to leg.  Dressing has been removed and salve has been washed away.  Area is closed, with no open wound present.  Patient states his leg only bothers him "when he messes with it".  "

## 2017-03-15 NOTE — ED NOTES
Pt resting in bed. No acute distress, RR equal and non labored, VSS. Bed in low, locked, position and call light is within reach. Side rails up X 2. Will continue to monitor.

## 2017-03-15 NOTE — ED NOTES
Mariam on Observation unit states she is ready to receive the patient at this time.  Preparing the patient for transport.

## 2017-03-15 NOTE — PROGRESS NOTES
Pt arrived to floor via stretcher per ER RN; awake and alert; ambulated to bed w/ staff assist x 1; oriented to room and call light; telemetry monitor applied; educated on hourly rounding; full assess per flowsheets

## 2017-03-15 NOTE — SUBJECTIVE & OBJECTIVE
"Past Medical History:   Diagnosis Date    Acute coronary syndrome     Bladder cancer     Bradycardia     CHF (congestive heart failure)     CKD (chronic kidney disease) stage 3, GFR 30-59 ml/min     Coronary artery disease     Diabetes mellitus     Heart attack     Hyperlipidemia     Hypertension     Macular degeneration     Pacemaker        Past Surgical History:   Procedure Laterality Date    BLADDER SURGERY      CARDIAC PACEMAKER PLACEMENT      CATARACT EXTRACTION      colon removal.          Review of patient's allergies indicates:  No Known Allergies    No current facility-administered medications on file prior to encounter.      Current Outpatient Prescriptions on File Prior to Encounter   Medication Sig    amiodarone (PACERONE) 200 MG Tab Take 1 tablet (200 mg total) by mouth 2 (two) times daily.    aspirin (ECOTRIN) 81 MG EC tablet Take 81 mg by mouth once daily.    atorvastatin (LIPITOR) 20 MG tablet Take 1 tablet (20 mg total) by mouth every evening.    BD INSULIN PEN NEEDLE UF MINI 31 gauge x 3/16" Ndle     bumetanide (BUMEX) 2 MG tablet Take 1 tablet (2 mg total) by mouth once daily.    cyanocobalamin 1,000 mcg/mL injection     fenofibrate 160 MG Tab Take 160 mg by mouth once daily.    glipiZIDE (GLUCOTROL) 2.5 MG TR24 Take 2.5 mg by mouth 2 (two) times daily with meals.     insulin glargine (LANTUS) 100 unit/mL injection Inject 15 Units into the skin 2 (two) times daily.     losartan (COZAAR) 25 MG tablet Take 0.5 tablets (12.5 mg total) by mouth once daily.    metoprolol succinate (TOPROL-XL) 25 MG 24 hr tablet Take 1 tablet (25 mg total) by mouth every evening.    nitroGLYCERIN (NITROSTAT) 0.4 MG SL tablet Place 1 tablet (0.4 mg total) under the tongue every 5 (five) minutes as needed for Chest pain.    potassium chloride SA (K-DUR,KLOR-CON) 10 MEQ tablet Take 10 mEq by mouth once.    tamsulosin (FLOMAX) 0.4 mg Cp24 Take 0.4 mg by mouth.     Family History     Problem " Relation (Age of Onset)    No Known Problems Mother, Father, Sister, Brother, Maternal Aunt, Maternal Uncle, Paternal Aunt, Paternal Uncle, Maternal Grandmother, Maternal Grandfather, Paternal Grandmother, Paternal Grandfather        Social History Main Topics    Smoking status: Former Smoker    Smokeless tobacco: Not on file    Alcohol use No    Drug use: No    Sexual activity: Not on file     Review of Systems   Constitutional: Negative for appetite change, chills, diaphoresis, fatigue and fever.   HENT: Negative for congestion, nosebleeds, sore throat and trouble swallowing.    Eyes: Negative for pain, discharge and visual disturbance.   Respiratory: Positive for cough and wheezing. Negative for apnea, chest tightness, shortness of breath and stridor.    Cardiovascular: Positive for chest pain and leg swelling. Negative for palpitations.   Gastrointestinal: Negative for abdominal distention, abdominal pain, blood in stool, constipation, diarrhea, nausea and vomiting.   Endocrine: Negative for cold intolerance and heat intolerance.   Genitourinary: Negative for difficulty urinating, dysuria, flank pain, frequency and urgency.   Musculoskeletal: Negative for arthralgias, back pain, joint swelling, myalgias, neck pain and neck stiffness.   Skin: Positive for color change. Negative for rash and wound.        Pt reports pain, erythema and swelling to right LE    Allergic/Immunologic: Negative for food allergies and immunocompromised state.   Neurological: Negative for dizziness, seizures, syncope, facial asymmetry, weakness, light-headedness and headaches.   Hematological: Negative for adenopathy.   Psychiatric/Behavioral: Negative for agitation, behavioral problems and confusion. The patient is not nervous/anxious.      Objective:     Vital Signs (Most Recent):  Temp: 98.7 °F (37.1 °C) (03/14/17 1922)  Pulse: 77 (03/14/17 2303)  Resp: 17 (03/14/17 2303)  BP: (!) 115/50 (03/14/17 2146)  SpO2: 98 % (03/14/17 2303)  Vital Signs (24h Range):  Temp:  [98.7 °F (37.1 °C)] 98.7 °F (37.1 °C)  Pulse:  [70-77] 77  Resp:  [17-20] 17  SpO2:  [87 %-98 %] 98 %  BP: ()/(50-54) 115/50     Weight: 95.3 kg (210 lb)  Body mass index is 29.29 kg/(m^2).    Physical Exam   Constitutional: He is oriented to person, place, and time. He appears well-developed and well-nourished. No distress.   HENT:   Head: Normocephalic and atraumatic.   Nose: Nose normal.   Mouth/Throat: Oropharynx is clear and moist.   Eyes: Conjunctivae and EOM are normal. No scleral icterus.   Neck: Normal range of motion. Neck supple.   Cardiovascular: Normal rate, regular rhythm, normal heart sounds and intact distal pulses.  Exam reveals no gallop and no friction rub.    No murmur heard.  Pulmonary/Chest: Effort normal. No stridor. No respiratory distress. He has wheezes. He has no rales. He exhibits no tenderness.   Abdominal: Soft. Bowel sounds are normal. He exhibits no distension. There is no tenderness. There is no rebound and no guarding.   Musculoskeletal: Normal range of motion. He exhibits edema. He exhibits no tenderness or deformity.   RT LE edema    Neurological: He is alert and oriented to person, place, and time. No cranial nerve deficit. He exhibits normal muscle tone. Coordination normal.   Skin: Skin is warm and dry. No rash noted. He is not diaphoretic. There is erythema. No pallor.   TTP, erythema and edema RT LE    Psychiatric: He has a normal mood and affect. His behavior is normal. Thought content normal.   Nursing note and vitals reviewed.       Significant Labs:   CBC:   Recent Labs  Lab 03/14/17 2030   WBC 9.36   HGB 11.8*   HCT 37.2*        CMP:   Recent Labs  Lab 03/14/17 2030      K 3.7   CL 98   CO2 33*   *   BUN 29   CREATININE 1.9*   CALCIUM 8.7   PROT 6.7   ALBUMIN 3.4*   BILITOT 0.6   ALKPHOS 44*   AST 15   ALT 7*   ANIONGAP 8   EGFRNONAA 29*     Troponin:   Recent Labs  Lab 03/14/17 2030   TROPONINI 0.539*        Significant Imaging:   Imaging Results         US Lower Extremity Veins Right (Final result) Result time:  03/14/17 22:31:37    Final result by Kameron Cole MD (03/14/17 22:31:37)    Impression:      Negative for DVT      Electronically signed by: KAMERON COLE MD  Date:     03/14/17  Time:    22:31     Narrative:    EXAM: US LOWER EXTREMITY VEINS RIGHT    CLINICAL HISTORY: Right leg pain    FINDINGS:  The deep veins of  the right lower extremity from common femoral through popliteal vein were examined. The veins demonstrate normal flow, compressibility, venous waveforms, and response to augmentation.            X-Ray Chest 1 View (Final result) Result time:  03/14/17 20:17:29    Final result by Kameron Cole MD (03/14/17 20:17:29)    Impression:      No acute findings.      Electronically signed by: KAMERON COLE MD  Date:     03/14/17  Time:    20:17     Narrative:    EXAM: XR CHEST 1 VIEW    CLINICAL HISTORY: Hypotension.    COMPARISON STUDIES: April 14, 2016    FINDINGS:  Mild cardiomegaly.  Left chest pacer.  Aortic atherosclerosis.  Lungs are clear.            X-Ray Tibia Fibula 2 View Right (Final result) Result time:  03/14/17 20:18:39    Final result by Kameron Cole MD (03/14/17 20:18:39)    Impression:      Diffuse edema without underlying bony abnormality.      Electronically signed by: KAMERON COLE MD  Date:     03/14/17  Time:    20:18     Narrative:    EXAM: XR TIBIA FIBULA 2 VIEW RIGHT    CLINICAL HISTORY:  Right leg cellulitis    FINDINGS:  Negative for fracture or dislocation.  Diffuse superficial soft tissue edema.  Arterial vascular calcifications.

## 2017-03-15 NOTE — PLAN OF CARE
Problem: Pressure Ulcer Risk (Carlos Enrique Scale) (Adult,Obstetrics,Pediatric)  Goal: Identify Related Risk Factors and Signs and Symptoms  Related risk factors and signs and symptoms are identified upon initiation of Human Response Clinical Practice Guideline (CPG)   Outcome: Ongoing (interventions implemented as appropriate)  On going

## 2017-03-15 NOTE — CONSULTS
Clinical Pharmacy: Vancomycin Consult Note     Rajiv Russo [id number] is a 89 y.o. male who has been consulted for vancomycin dosing.  Consult request by Lydia Hill NP     Dx: Abscess on leg, Skin and Soft tissue infection  Therapeutic trough goal is 10-15 mcg/mL  Tmax: 98.7     The patient has the following labs:      Date Creatinine (mg/dl)     BUN WBC Count   3/15/2017 Estimated Creatinine Clearance: 28.7 mL/min (based on Cr of 1.7).        Lab Results    Component Value Date     BUN 28 03/14/2017            Lab Results    Component Value Date     WBC 9.36 03/14/2017       which calculates to an Estimated Creatinine Clearance: 28.7 mL/min (based on Cr of 1.7)..         Current weight is 96.2 kg (212 lb)     Congratulation on his 89 birthday today!!!!     The patient will be started on vancomycin at a dose of 1250 mg and may require doses every 36 hours. We will pulse dose.  1,250 mg q 48 hr has been ordered as a place jiang beginning at 0000 midnight on 3/17/17.     The first random level is scheduled for 30 hours post first dose at 0600 on 3/16/17.  Patient will be followed by pharmacy for changes in renal function, toxicity, and efficacy.      Thank you for allowing us to participate in this patient's care.   Chente Barrera, PharmD 3/15/2017 05:45

## 2017-03-15 NOTE — H&P
"[facility] Hospital Medicine  History & Physical    Patient Name: Rajiv Russo  MRN: [id number]  Admission Date: 3/14/2017  Attending Physician: Dr. Phillips   Primary Care Provider: Dean Soto Jr, MD         Patient information was obtained from patient and ER records.     Subjective:     Principal Problem:Chest pain    Chief Complaint:   Chief Complaint   Patient presents with    Wound Check     right lower leg. reports " i have a boil"         HPI: Patient is a 89 y.o. male with DM type II, old MI, CAD, bradycardia s/p PPM, CKD3, Low normal BP, Bladder and prostate CA , CHF with EF 30%, moderate to severe AS, on home oxygen at night who presented to ED with RLE pain, erythema and swelling - onset last night. Pt denies fever/chills. Denies SOB or weight gain. The pt also reports cough and congestion x one week. After asking, the pt reports he has experienced chest tightness for last 2 nights that awoke him from sleep rated 5/10 on scale-relieved with one dose of NTG. Pt denied any associated symptoms such as SOB, N/V or diaphoresis.   Of note, Pt had a Nuclear stress test 4/2016 which showed no reversible ischemia but a large fixed deficit consistent with myocardial injury. Cardiology recommended LHC at the time, however, pt and his family declined LHC and elected medical management.  In the ED, WBC normal, Xray showed diffuse edema without underlying bony abnormality. Venous doppler U/S showed no DVT. Troponin 0.5, EKG showed paced rhythm.       Past Medical History:   Diagnosis Date    Acute coronary syndrome     Bladder cancer     Bradycardia     CHF (congestive heart failure)     CKD (chronic kidney disease) stage 3, GFR 30-59 ml/min     Coronary artery disease     Diabetes mellitus     Heart attack     Hyperlipidemia     Hypertension     Macular degeneration     Pacemaker        Past Surgical History:   Procedure Laterality Date    BLADDER SURGERY      CARDIAC PACEMAKER " "PLACEMENT      CATARACT EXTRACTION      colon removal.          Review of patient's allergies indicates:  No Known Allergies    No current facility-administered medications on file prior to encounter.      Current Outpatient Prescriptions on File Prior to Encounter   Medication Sig    amiodarone (PACERONE) 200 MG Tab Take 1 tablet (200 mg total) by mouth 2 (two) times daily.    aspirin (ECOTRIN) 81 MG EC tablet Take 81 mg by mouth once daily.    atorvastatin (LIPITOR) 20 MG tablet Take 1 tablet (20 mg total) by mouth every evening.    BD INSULIN PEN NEEDLE UF MINI 31 gauge x 3/16" Ndle     bumetanide (BUMEX) 2 MG tablet Take 1 tablet (2 mg total) by mouth once daily.    cyanocobalamin 1,000 mcg/mL injection     fenofibrate 160 MG Tab Take 160 mg by mouth once daily.    glipiZIDE (GLUCOTROL) 2.5 MG TR24 Take 2.5 mg by mouth 2 (two) times daily with meals.     insulin glargine (LANTUS) 100 unit/mL injection Inject 15 Units into the skin 2 (two) times daily.     losartan (COZAAR) 25 MG tablet Take 0.5 tablets (12.5 mg total) by mouth once daily.    metoprolol succinate (TOPROL-XL) 25 MG 24 hr tablet Take 1 tablet (25 mg total) by mouth every evening.    nitroGLYCERIN (NITROSTAT) 0.4 MG SL tablet Place 1 tablet (0.4 mg total) under the tongue every 5 (five) minutes as needed for Chest pain.    potassium chloride SA (K-DUR,KLOR-CON) 10 MEQ tablet Take 10 mEq by mouth once.    tamsulosin (FLOMAX) 0.4 mg Cp24 Take 0.4 mg by mouth.     Family History     Problem Relation (Age of Onset)    No Known Problems Mother, Father, Sister, Brother, Maternal Aunt, Maternal Uncle, Paternal Aunt, Paternal Uncle, Maternal Grandmother, Maternal Grandfather, Paternal Grandmother, Paternal Grandfather        Social History Main Topics    Smoking status: Former Smoker    Smokeless tobacco: Not on file    Alcohol use No    Drug use: No    Sexual activity: Not on file     Review of Systems   Constitutional: Negative for " appetite change, chills, diaphoresis, fatigue and fever.   HENT: Negative for congestion, nosebleeds, sore throat and trouble swallowing.    Eyes: Negative for pain, discharge and visual disturbance.   Respiratory: Positive for cough and wheezing. Negative for apnea, chest tightness, shortness of breath and stridor.    Cardiovascular: Positive for chest pain and leg swelling. Negative for palpitations.   Gastrointestinal: Negative for abdominal distention, abdominal pain, blood in stool, constipation, diarrhea, nausea and vomiting.   Endocrine: Negative for cold intolerance and heat intolerance.   Genitourinary: Negative for difficulty urinating, dysuria, flank pain, frequency and urgency.   Musculoskeletal: Negative for arthralgias, back pain, joint swelling, myalgias, neck pain and neck stiffness.   Skin: Positive for color change. Negative for rash and wound.        Pt reports pain, erythema and swelling to right LE    Allergic/Immunologic: Negative for food allergies and immunocompromised state.   Neurological: Negative for dizziness, seizures, syncope, facial asymmetry, weakness, light-headedness and headaches.   Hematological: Negative for adenopathy.   Psychiatric/Behavioral: Negative for agitation, behavioral problems and confusion. The patient is not nervous/anxious.      Objective:     Vital Signs (Most Recent):  Temp: 98.7 °F (37.1 °C) (03/14/17 1922)  Pulse: 77 (03/14/17 2303)  Resp: 17 (03/14/17 2303)  BP: (!) 115/50 (03/14/17 2146)  SpO2: 98 % (03/14/17 2303) Vital Signs (24h Range):  Temp:  [98.7 °F (37.1 °C)] 98.7 °F (37.1 °C)  Pulse:  [70-77] 77  Resp:  [17-20] 17  SpO2:  [87 %-98 %] 98 %  BP: ()/(50-54) 115/50     Weight: 95.3 kg (210 lb)  Body mass index is 29.29 kg/(m^2).    Physical Exam   Constitutional: He is oriented to person, place, and time. He appears well-developed and well-nourished. No distress.   HENT:   Head: Normocephalic and atraumatic.   Nose: Nose normal.   Mouth/Throat:  Oropharynx is clear and moist.   Eyes: Conjunctivae and EOM are normal. No scleral icterus.   Neck: Normal range of motion. Neck supple.   Cardiovascular: Normal rate, regular rhythm, normal heart sounds and intact distal pulses.  Exam reveals no gallop and no friction rub.    No murmur heard.  Pulmonary/Chest: Effort normal. No stridor. No respiratory distress. He has wheezes. He has no rales. He exhibits no tenderness.   Abdominal: Soft. Bowel sounds are normal. He exhibits no distension. There is no tenderness. There is no rebound and no guarding.   Musculoskeletal: Normal range of motion. He exhibits edema. He exhibits no tenderness or deformity.   RT LE edema    Neurological: He is alert and oriented to person, place, and time. No cranial nerve deficit. He exhibits normal muscle tone. Coordination normal.   Skin: Skin is warm and dry. No rash noted. He is not diaphoretic. There is erythema. No pallor.   TTP, erythema and edema RT LE    Psychiatric: He has a normal mood and affect. His behavior is normal. Thought content normal.   Nursing note and vitals reviewed.       Significant Labs:   CBC:   Recent Labs  Lab 03/14/17 2030   WBC 9.36   HGB 11.8*   HCT 37.2*        CMP:   Recent Labs  Lab 03/14/17 2030      K 3.7   CL 98   CO2 33*   *   BUN 29   CREATININE 1.9*   CALCIUM 8.7   PROT 6.7   ALBUMIN 3.4*   BILITOT 0.6   ALKPHOS 44*   AST 15   ALT 7*   ANIONGAP 8   EGFRNONAA 29*     Troponin:   Recent Labs  Lab 03/14/17 2030   TROPONINI 0.539*       Significant Imaging:   Imaging Results         US Lower Extremity Veins Right (Final result) Result time:  03/14/17 22:31:37    Final result by Kameron Cole MD (03/14/17 22:31:37)    Impression:      Negative for DVT      Electronically signed by: KAMERON COLE MD  Date:     03/14/17  Time:    22:31     Narrative:    EXAM: US LOWER EXTREMITY VEINS RIGHT    CLINICAL HISTORY: Right leg pain    FINDINGS:  The deep veins of  the right lower  extremity from common femoral through popliteal vein were examined. The veins demonstrate normal flow, compressibility, venous waveforms, and response to augmentation.            X-Ray Chest 1 View (Final result) Result time:  03/14/17 20:17:29    Final result by Kameron Cole MD (03/14/17 20:17:29)    Impression:      No acute findings.      Electronically signed by: KAMERON COLE MD  Date:     03/14/17  Time:    20:17     Narrative:    EXAM: XR CHEST 1 VIEW    CLINICAL HISTORY: Hypotension.    COMPARISON STUDIES: April 14, 2016    FINDINGS:  Mild cardiomegaly.  Left chest pacer.  Aortic atherosclerosis.  Lungs are clear.            X-Ray Tibia Fibula 2 View Right (Final result) Result time:  03/14/17 20:18:39    Final result by Kameron Cole MD (03/14/17 20:18:39)    Impression:      Diffuse edema without underlying bony abnormality.      Electronically signed by: KAMERON COLE MD  Date:     03/14/17  Time:    20:18     Narrative:    EXAM: XR TIBIA FIBULA 2 VIEW RIGHT    CLINICAL HISTORY:  Right leg cellulitis    FINDINGS:  Negative for fracture or dislocation.  Diffuse superficial soft tissue edema.  Arterial vascular calcifications.            Assessment/Plan:     * Chest pain  Pt has been offered [facility] for unstable angina one year ago and he and family declined-wanted medical management.   Serial troponin   Cont ASA, Statin, BB, ARB  Consult cardiology      Elevated troponin  See above       Cellulitis of leg  RLE cellulitis  IV Vancomycin and Zosyn  Blood cultures obtained  Monitor         COPD with acute exacerbation  Neb txs  Mucinex       CAD (coronary artery disease)  See above      DM type 2, uncontrolled, with renal complications  Levemir  NISS      CKD (chronic kidney disease) stage 3, GFR 30-59 ml/min  monitor      Chronic systolic CHF (congestive heart failure)  Cont Bumex and ARB if BP tolerates      NSVT (nonsustained ventricular tachycardia)  Cont amiodarone       VTE Risk Mitigation         Ordered      Medium Risk of VTE  Once      03/14/17 2215     Place sequential compression device  Until discontinued      03/14/17 2215        Lydia Hill NP  Department of Hospital Medicine   [facility]

## 2017-03-15 NOTE — NURSING
Patient assessed for diabetes educational needs following chart review  He has attended diabetes class in the past  He has a home glucose meter and checks 2 times daily with averages 140 pre-meals and 200 post meals   He uses a voice glucose meter  He is prescribed Lantus 10 units BID   Admits to skipping doses if his glucose is ok   He also increases dose to 15 units if his glucose is elevated      Lengthy discussion on importance of taking insulin as prescribed    He uses the insulin pen  And his insulin is administered by his sitters due to visual difficulties    He reports correct insulin storage and site selection/rotation  Has good recall on teach back but reinforced info on target glucose values/hyper/hypoglycemia  Eye and foot care  He verbalizes understanding and provided with literature

## 2017-03-15 NOTE — PROGRESS NOTES
Pt. Indicated that he wants to see Cardiology while he is currently present in the hospital, and he will go home regardless if he sees cardiologist are not.  Notified

## 2017-03-15 NOTE — ED NOTES
Pt now resting in bed. No acute distress, RR equal and non labored, VSS. Bed in low, locked, position and call light is within reach. Side rails up X 2. Son remains at bedside. Will continue to monitor.

## 2017-03-15 NOTE — PLAN OF CARE
Problem: Patient Care Overview  Goal: Plan of Care Review  Outcome: Ongoing (interventions implemented as appropriate)  Free of falls/injury during shift  Minimal c/o pain  DM monitored and managed  NSR on telemetry  Serial troponin elevated  Abx therapy in place  Safety interventions in place

## 2017-03-15 NOTE — ASSESSMENT & PLAN NOTE
Pt has been offered [facility] for unstable angina one year ago and he and family declined-wanted medical management.   Serial troponin   Cont ASA, Statin, BB, ARB  Consult cardiology

## 2017-03-15 NOTE — ED PROVIDER NOTES
"SCRIBE #1 NOTE: I, Jessica Bach, am scribing for, and in the presence of, Farzad Lopez MD. I have scribed the entire note.     SCRIBE #2 NOTE: I, Chente Sagastume, am scribing for, and in the presence of,  Andres Reynolds MD. I have scribed the remaining portions of the note not scribed by Scribe #1.     History      Chief Complaint   Patient presents with    Wound Check     right lower leg. reports " i have a boil"        Review of patient's allergies indicates:  No Known Allergies     HPI   HPI    3/14/2017, 7:52 PM   History obtained from the patient      History of Present Illness: Rajiv Russo is a 89 y.o. male patient who presents to the Emergency Department for redness to RLE which onset gradually last night. Sx have been constant and moderate in severity. No mitigating or exacerbating factors reported. No associated sx. Pt denies any fever, chills, warmth/drainage to the site, dizziness, fatigue, CP, SOB, and all other sx. No further complaints or concerns at this time.        Arrival mode: Personal vehicle     PCP: Dean Soto Jr, MD       Past Medical History:  Past Medical History:   Diagnosis Date    Acute coronary syndrome     Bladder cancer     Bradycardia     CHF (congestive heart failure)     CKD (chronic kidney disease) stage 3, GFR 30-59 ml/min     Coronary artery disease     Diabetes mellitus     Heart attack     Hyperlipidemia     Hypertension     Macular degeneration     Pacemaker        Past Surgical History:  Past Surgical History:   Procedure Laterality Date    BLADDER SURGERY      CARDIAC PACEMAKER PLACEMENT      CATARACT EXTRACTION      colon removal.            Family History:  Family History   Problem Relation Age of Onset    No Known Problems Mother     No Known Problems Father     No Known Problems Sister     No Known Problems Brother     No Known Problems Maternal Aunt     No Known Problems Maternal Uncle     No Known Problems Paternal Aunt  "    No Known Problems Paternal Uncle     No Known Problems Maternal Grandmother     No Known Problems Maternal Grandfather     No Known Problems Paternal Grandmother     No Known Problems Paternal Grandfather     Amblyopia Neg Hx     Blindness Neg Hx     Cancer Neg Hx     Cataracts Neg Hx     Diabetes Neg Hx     Glaucoma Neg Hx     Hypertension Neg Hx     Macular degeneration Neg Hx     Retinal detachment Neg Hx     Strabismus Neg Hx     Stroke Neg Hx     Thyroid disease Neg Hx        Social History:  Social History     Social History Main Topics    Smoking status: Former Smoker    Smokeless tobacco: Unknown    Alcohol use No    Drug use: No    Sexual activity: Unknown       ROS   Review of Systems   Constitutional: Negative for chills, fatigue and fever.   HENT: Negative for sore throat.    Respiratory: Negative for shortness of breath.    Cardiovascular: Negative for chest pain.   Gastrointestinal: Negative for nausea.   Genitourinary: Negative for dysuria.   Musculoskeletal: Negative for back pain.   Skin: Negative for rash.        Positive for redness to RLE.   Neurological: Negative for dizziness and weakness.   Hematological: Does not bruise/bleed easily.     Physical Exam    Initial Vitals   BP Pulse Resp Temp SpO2   03/14/17 1922 03/14/17 1922 03/14/17 1922 03/14/17 1922 03/14/17 1922   81/54 77 19 98.7 °F (37.1 °C) 87 %      Physical Exam  Nursing Notes and Vital Signs Reviewed.  Constitutional: Patient is in no acute distress. Awake and alert. Well-developed and well-nourished.  Head: Atraumatic. Normocephalic.  Eyes: PERRL. EOM intact. Conjunctivae are not pale. No scleral icterus.  ENT: Mucous membranes are moist. Oropharynx is clear and symmetric.    Neck: Supple. Full ROM. No lymphadenopathy.  Cardiovascular: Regular rate. Regular rhythm. No murmurs, rubs, or gallops. Distal pulses are 2+ and symmetric.  Pulmonary/Chest: No respiratory distress. Clear to auscultation bilaterally.  "No wheezing, rales, or rhonchi.  Abdominal: Soft and non-distended.  There is no tenderness.  No rebound, guarding, or rigidity. Good bowel sounds.  Genitourinary: No CVA tenderness  Musculoskeletal: Moves all extremities. No obvious deformities. No edema. No calf tenderness.  Skin: Warm and dry. There is a 5cm area of erythema to RLE. No fluctuance.   Neurological:  Alert, awake, and appropriate.  Normal speech.  No acute focal neurological deficits are appreciated.  Psychiatric: Normal affect. Good eye contact. Appropriate in content.    ED Course    Procedures  ED Vital Signs:  Vitals:    03/14/17 1922 03/14/17 2146 03/14/17 2149   BP: (!) 81/54 (!) 115/50    Pulse: 77 70 71   Resp: 19 20 19   Temp: 98.7 °F (37.1 °C)     TempSrc: Oral     SpO2: (!) 87% (!) 94% 96%   Weight: 95.3 kg (210 lb)     Height: 5' 11" (1.803 m)         Abnormal Lab Results:  Labs Reviewed   CBC W/ AUTO DIFFERENTIAL - Abnormal; Notable for the following:        Result Value    RBC 4.08 (*)     Hemoglobin 11.8 (*)     Hematocrit 37.2 (*)     MCHC 31.7 (*)     Lymph # 0.9 (*)     Gran% 80.1 (*)     Lymph% 9.4 (*)     All other components within normal limits   COMPREHENSIVE METABOLIC PANEL - Abnormal; Notable for the following:     CO2 33 (*)     Glucose 311 (*)     Creatinine 1.9 (*)     Albumin 3.4 (*)     Alkaline Phosphatase 44 (*)     ALT 7 (*)     eGFR if  33 (*)     eGFR if non  29 (*)     All other components within normal limits   TROPONIN I - Abnormal; Notable for the following:     Troponin I 0.539 (*)     All other components within normal limits   CULTURE, BLOOD   CULTURE, BLOOD   LACTIC ACID, PLASMA   PROTIME-INR   APTT   TROPONIN I   TROPONIN I        All Lab Results:  Results for orders placed or performed during the hospital encounter of 03/14/17   CBC auto differential   Result Value Ref Range    WBC 9.36 3.90 - 12.70 K/uL    RBC 4.08 (L) 4.60 - 6.20 M/uL    Hemoglobin 11.8 (L) 14.0 - 18.0 " g/dL    Hematocrit 37.2 (L) 40.0 - 54.0 %    MCV 91 82 - 98 fL    MCH 28.9 27.0 - 31.0 pg    MCHC 31.7 (L) 32.0 - 36.0 %    RDW 14.3 11.5 - 14.5 %    Platelets 175 150 - 350 K/uL    MPV 10.1 9.2 - 12.9 fL    Gran # 7.5 1.8 - 7.7 K/uL    Lymph # 0.9 (L) 1.0 - 4.8 K/uL    Mono # 0.9 0.3 - 1.0 K/uL    Eos # 0.1 0.0 - 0.5 K/uL    Baso # 0.01 0.00 - 0.20 K/uL    Gran% 80.1 (H) 38.0 - 73.0 %    Lymph% 9.4 (L) 18.0 - 48.0 %    Mono% 9.9 4.0 - 15.0 %    Eosinophil% 0.5 0.0 - 8.0 %    Basophil% 0.1 0.0 - 1.9 %    Differential Method Automated    Comprehensive metabolic panel   Result Value Ref Range    Sodium 139 136 - 145 mmol/L    Potassium 3.7 3.5 - 5.1 mmol/L    Chloride 98 95 - 110 mmol/L    CO2 33 (H) 23 - 29 mmol/L    Glucose 311 (H) 70 - 110 mg/dL    BUN, Bld 29 10 - 30 mg/dL    Creatinine 1.9 (H) 0.5 - 1.4 mg/dL    Calcium 8.7 8.7 - 10.5 mg/dL    Total Protein 6.7 6.0 - 8.4 g/dL    Albumin 3.4 (L) 3.5 - 5.2 g/dL    Total Bilirubin 0.6 0.1 - 1.0 mg/dL    Alkaline Phosphatase 44 (L) 55 - 135 U/L    AST 15 10 - 40 U/L    ALT 7 (L) 10 - 44 U/L    Anion Gap 8 8 - 16 mmol/L    eGFR if African American 33 (A) >60 mL/min/1.73 m^2    eGFR if non African American 29 (A) >60 mL/min/1.73 m^2   Lactic acid, plasma   Result Value Ref Range    Lactate (Lactic Acid) 1.7 0.5 - 2.2 mmol/L   Troponin I   Result Value Ref Range    Troponin I 0.539 (H) 0.000 - 0.026 ng/mL   Protime-INR   Result Value Ref Range    Prothrombin Time 11.4 9.0 - 12.5 sec    INR 1.1 0.8 - 1.2   APTT   Result Value Ref Range    aPTT 28.1 21.0 - 32.0 sec         Imaging Results:  Imaging Results         X-Ray Chest 1 View (Final result) Result time:  03/14/17 20:17:29    Final result by Kameron Cole MD (03/14/17 20:17:29)    Impression:      No acute findings.      Electronically signed by: KAMERON COLE MD  Date:     03/14/17  Time:    20:17     Narrative:    EXAM: XR CHEST 1 VIEW    CLINICAL HISTORY: Hypotension.    COMPARISON STUDIES: April 14,  2016    FINDINGS:  Mild cardiomegaly.  Left chest pacer.  Aortic atherosclerosis.  Lungs are clear.            X-Ray Tibia Fibula 2 View Right (Final result) Result time:  03/14/17 20:18:39    Final result by Kameron Cole MD (03/14/17 20:18:39)    Impression:      Diffuse edema without underlying bony abnormality.      Electronically signed by: KAMERON COLE MD  Date:     03/14/17  Time:    20:18     Narrative:    EXAM: XR TIBIA FIBULA 2 VIEW RIGHT    CLINICAL HISTORY:  Right leg cellulitis    FINDINGS:  Negative for fracture or dislocation.  Diffuse superficial soft tissue edema.  Arterial vascular calcifications.             The EKG was ordered, reviewed, and independently interpreted by the ED provider.  Interpretation time: 20:10  Rate: 72 BPM  Rhythm: atrial-paced rhythm  Interpretation: Axis normal. No evidence of acute ischemia. No STEMI.           The Emergency Provider reviewed the vital signs and test results, which are outlined above.    ED Discussion       8:00 PM: Dr. Lopez transfers care of pt to Dr. Reynolds, pending lab and imaging results.    8:11 PM: Re-evaluated pt. Pt is awake, alert and oriented. Pt is nontoxic appearing and in no signs of distress. Pt is en route to CT scan for CT of his lower extremities. D/w pt all pertinent results. D/w pt any concerns expressed at this time. Answered all questions. Pt expresses understanding at this time.    10:04 PM: Dr. Reynolds discussed the pt's case with Dr. Gutierrez (Cardiology) who recommends admit for serial enzymes. Dr. Gutierrez did not recommend anticoagulation other than Asprin.    10:11 PM: Discussed case with Dr. Phillips ([facility]). Dr. Phillips agrees with current care and management of pt and accepts admission.   Admitting Service: Hospital medicine   Admitting Physician: Dr. Phillips  Admit to: Telemetry    10:12 PM: Re-evaluated pt. I have discussed test results, shared treatment plan, and the need for admission with patient and  family at bedside. Pt and family express understanding at this time and agree with all information. All questions answered. Pt and family have no further questions or concerns at this time. Pt is ready for admit.            ED Medication(s):  Medications   aspirin tablet 325 mg (not administered)   clindamycin 600 MG/50 ML D5W 600 mg/50 mL IVPB 600 mg (not administered)   sodium chloride 0.9% bolus 1,000 mL (1,000 mLs Intravenous New Bag 3/14/17 2032)   clindamycin 600 MG/50 ML D5W 600 mg/50 mL IVPB 600 mg (600 mg Intravenous New Bag 3/14/17 2032)       New Prescriptions    No medications on file             Medical Decision Making    Medical Decision Making:   Clinical Tests:   Lab Tests: Reviewed and Ordered  Radiological Study: Reviewed and Ordered  Medical Tests: Reviewed and Ordered           Scribe Attestation:   Scribe #1: I performed the above scribed service and the documentation accurately describes the services I performed. I attest to the accuracy of the note.    Attending:   Physician Attestation Statement for Scribe #1: I, Farzad Lopez MD, personally performed the services described in this documentation, as scribed by Jessica Bach, in my presence, and it is both accurate and complete.       Scribe Attestation:   Scribe #2: I performed the above scribed service and the documentation accurately describes the services I performed. I attest to the accuracy of the note.    Attending Attestation:           Physician Attestation for Scribe:    Physician Attestation Statement for Scribe #2: I, Andres Reynolds MD, reviewed documentation, as scribed by Chente Sagastume in my presence, and it is both accurate and complete. I also acknowledge and confirm the content of the note done by Scribe #1.          Clinical Impression       ICD-10-CM ICD-9-CM   1. Elevated troponin R79.89 790.6   2. Hypotension I95.9 458.9   3. Cellulitis L03.90 682.9   4. Lower extremity pain, right M79.604 729.5        Disposition:   Disposition: Placed in Observation  Condition: Michelle Reynolds MD  03/15/17 0315

## 2017-03-15 NOTE — PLAN OF CARE
Problem: Fall Risk (Adult)  Goal: Identify Related Risk Factors and Signs and Symptoms  Related risk factors and signs and symptoms are identified upon initiation of Human Response Clinical Practice Guideline (CPG)   Outcome: Ongoing (interventions implemented as appropriate)  Fall prevention protocol continued   Goal: Absence of Falls  Patient will demonstrate the desired outcomes by discharge/transition of care.   Outcome: Ongoing (interventions implemented as appropriate)  Fall prevention protocol continued

## 2017-03-15 NOTE — PROGRESS NOTES
PROGRESS NOTE  HOSPITAL MEDICINE    Admit Date: 3/14/2017    Follow-up For:  Chest pain     Subjective:      HPI:    Patient is a 89 y.o. male with DM type II, old MI, CAD, bradycardia s/p PPM, CKD3, Low normal BP, Bladder and prostate CA , CHF with EF 30%, moderate to severe AS, on home oxygen at night who presented to ED with RLE pain, erythema and swelling - onset last night. Pt denies fever/chills. Denies SOB or weight gain. The pt also reports cough and congestion x one week. After asking, the pt reports he has experienced chest tightness for last 2 nights that awoke him from sleep rated 5/10 on scale-relieved with one dose of NTG. Pt denied any associated symptoms such as SOB, N/V or diaphoresis.   Of note, Pt had a Nuclear stress test 4/2016 which showed no reversible ischemia but a large fixed deficit consistent with myocardial injury. Cardiology recommended LHC at the time, however, pt and his family declined LHC and elected medical management.  In the ED, WBC normal, Xray showed diffuse edema without underlying bony abnormality. Venous doppler U/S showed no DVT. Troponin 0.5, EKG showed paced rhythm.     Hospital course        Review of Systems       Objective:      Vital Signs Range (Last 24H):  Temp:  [97.8 °F (36.6 °C)-98.7 °F (37.1 °C)]   Pulse:  [69-90]   Resp:  [17-20]   BP: ()/(50-74)   SpO2:  [87 %-98 %]     I & O (Last 24H):    Intake/Output Summary (Last 24 hours) at 03/15/17 1629  Last data filed at 03/15/17 1500   Gross per 24 hour   Intake             1050 ml   Output              676 ml   Net              374 ml       Physical Exam   Constitutional: Patient is oriented to person, place, and time. Appears well-developed and well-nourished.   Head: Normocephalic and atraumatic. Mucous membranes moist.  Neck: Neck supple. No mass  Cardiovascular: Normal rate and regular rhythm.    Pulmonary/Chest: Effort normal and clear to auscultation bilaterally. No respiratory distress.   Abdomen:  Soft. Non-tender and non-distended. Bowel sounds are normal.   Neurological: Moves all extremities.  Skin: Warm and dry. No rashes.  Extremities: No clubbing cyanosis or edema.    Medications:  Current Facility-Administered Medications   Medication    acetaminophen tablet 650 mg    albuterol-ipratropium 2.5mg-0.5mg/3mL nebulizer solution 3 mL    amiodarone tablet 200 mg    aspirin EC tablet 81 mg    atorvastatin tablet 20 mg    bumetanide tablet 2 mg    dextrose 50% injection 12.5 g    dextrose 50% injection 25 g    glucagon (human recombinant) injection 1 mg    glucose chewable tablet 16 g    glucose chewable tablet 24 g    guaifenesin 12 hr tablet 600 mg    insulin aspart pen 0-5 Units    insulin detemir pen 10 Units    losartan split tablet 12.5 mg    metoprolol succinate (TOPROL-XL) 24 hr tablet 25 mg    piperacillin-tazobactam 4.5 g in dextrose 5 % 100 mL IVPB (ready to mix system)    tamsulosin 24 hr capsule 0.4 mg    [START ON 3/17/2017] vancomycin (VANCOCIN) 1,250 mg in dextrose 5 % 250 mL IVPB       Laboratory Data:  Reviewed and noted in plan where applicable. Please see chart for full laboratory data.    Lab Results   Component Value Date    WBC 7.70 03/15/2017    HGB 10.8 (L) 03/15/2017    HCT 34.2 (L) 03/15/2017    MCV 91 03/15/2017     03/15/2017         Recent Labs  Lab 03/14/17  2242   *      K 3.9      CO2 32*   BUN 28   CREATININE 1.7*   CALCIUM 8.6*       Microbiology Results (last 7 days)     Procedure Component Value Units Date/Time    Blood culture [[id number]] Collected:  03/14/17 2025    Order Status:  Completed Specimen:  Blood from Peripheral, Antecubital, Right Updated:  03/15/17 1515     Blood Culture, Routine No Growth to date    Blood culture [[id number]] Collected:  03/14/17 2010    Order Status:  Completed Specimen:  Blood from Peripheral, Antecubital, Left Updated:  03/15/17 1515     Blood Culture, Routine No Growth to date           Radiology:  Reviewed and noted in plan where applicable.  Please see chart for full radiology data.    ASSESSMENT/PLAN:     Active Hospital Problems    Diagnosis  POA    *Chest pain [R07.9]  Yes    Elevated troponin [R79.89]  Yes    Cellulitis of leg [L03.119]  Yes    NSVT (nonsustained ventricular tachycardia) [I47.2]  Yes    Chronic systolic CHF (congestive heart failure) [I50.22]  Yes    DM type 2, uncontrolled, with renal complications [E11.29, E11.65]  Yes     Chronic    COPD with acute exacerbation [J44.1]  Yes    HTN (hypertension) [I10]  Yes    CAD (coronary artery disease) [I25.10]  Yes    CKD (chronic kidney disease) stage 3, GFR 30-59 ml/min [N18.3]  Yes      Resolved Hospital Problems    Diagnosis Date Resolved POA   No resolved problems to display.      Chest pain  Pt has been offered [facility] for unstable angina one year ago and he and family declined-wanted medical management.   Serial troponin peaked -0.506  Cont ASA, Statin, BB, ARB       Cellulitis of leg   improving  RLE cellulitis  IV Vancomycin and Zosyn  Blood cultures -NGTD    transition to PO in am        COPD with acute exacerbation   stable no  wheezing  Neb txs  Mucinex            DM type 2, uncontrolled, with renal complications   fair control  Levemir  NISS        CKD (chronic kidney disease) stage 3, GFR 30-59 ml/min   stable  monitor        Chronic systolic CHF (congestive heart failure)   continue CHF core measures  Cont Bumex and ARB if BP tolerates        NSVT (nonsustained ventricular tachycardia)  Cont amiodarone

## 2017-03-16 LAB
ANION GAP SERPL CALC-SCNC: 8 MMOL/L
BASOPHILS # BLD AUTO: 0.01 K/UL
BASOPHILS NFR BLD: 0.1 %
BUN SERPL-MCNC: 26 MG/DL
CALCIUM SERPL-MCNC: 8.8 MG/DL
CHLORIDE SERPL-SCNC: 103 MMOL/L
CO2 SERPL-SCNC: 28 MMOL/L
CREAT SERPL-MCNC: 1.6 MG/DL
DIFFERENTIAL METHOD: ABNORMAL
EOSINOPHIL # BLD AUTO: 0.2 K/UL
EOSINOPHIL NFR BLD: 2.5 %
ERYTHROCYTE [DISTWIDTH] IN BLOOD BY AUTOMATED COUNT: 14 %
EST. GFR  (AFRICAN AMERICAN): 41 ML/MIN/1.73 M^2
EST. GFR  (NON AFRICAN AMERICAN): 35 ML/MIN/1.73 M^2
ESTIMATED AVG GLUCOSE: 183 MG/DL
GLUCOSE SERPL-MCNC: 212 MG/DL
HBA1C MFR BLD HPLC: 8 %
HCT VFR BLD AUTO: 35.4 %
HGB BLD-MCNC: 11.4 G/DL
LYMPHOCYTES # BLD AUTO: 1 K/UL
LYMPHOCYTES NFR BLD: 12.8 %
MCH RBC QN AUTO: 28.9 PG
MCHC RBC AUTO-ENTMCNC: 32.2 %
MCV RBC AUTO: 90 FL
MONOCYTES # BLD AUTO: 0.6 K/UL
MONOCYTES NFR BLD: 7.9 %
NEUTROPHILS # BLD AUTO: 5.9 K/UL
NEUTROPHILS NFR BLD: 76.7 %
PLATELET # BLD AUTO: 179 K/UL
PMV BLD AUTO: 10 FL
POCT GLUCOSE: 120 MG/DL (ref 70–110)
POCT GLUCOSE: 173 MG/DL (ref 70–110)
POCT GLUCOSE: 227 MG/DL (ref 70–110)
POCT GLUCOSE: 279 MG/DL (ref 70–110)
POTASSIUM SERPL-SCNC: 3.6 MMOL/L
RBC # BLD AUTO: 3.94 M/UL
SODIUM SERPL-SCNC: 139 MMOL/L
VANCOMYCIN SERPL-MCNC: 5.5 UG/ML
WBC # BLD AUTO: 7.71 K/UL

## 2017-03-16 PROCEDURE — 85025 COMPLETE CBC W/AUTO DIFF WBC: CPT

## 2017-03-16 PROCEDURE — 25000242 PHARM REV CODE 250 ALT 637 W/ HCPCS: Performed by: NURSE PRACTITIONER

## 2017-03-16 PROCEDURE — 25000003 PHARM REV CODE 250: Performed by: NURSE PRACTITIONER

## 2017-03-16 PROCEDURE — 63600175 PHARM REV CODE 636 W HCPCS: Performed by: NURSE PRACTITIONER

## 2017-03-16 PROCEDURE — 80202 ASSAY OF VANCOMYCIN: CPT

## 2017-03-16 PROCEDURE — 94640 AIRWAY INHALATION TREATMENT: CPT

## 2017-03-16 PROCEDURE — 63600175 PHARM REV CODE 636 W HCPCS: Performed by: INTERNAL MEDICINE

## 2017-03-16 PROCEDURE — 11000001 HC ACUTE MED/SURG PRIVATE ROOM

## 2017-03-16 PROCEDURE — 36415 COLL VENOUS BLD VENIPUNCTURE: CPT

## 2017-03-16 PROCEDURE — 25000003 PHARM REV CODE 250: Performed by: INTERNAL MEDICINE

## 2017-03-16 PROCEDURE — 80048 BASIC METABOLIC PNL TOTAL CA: CPT

## 2017-03-16 PROCEDURE — 99223 1ST HOSP IP/OBS HIGH 75: CPT | Mod: ,,, | Performed by: INTERNAL MEDICINE

## 2017-03-16 PROCEDURE — 94761 N-INVAS EAR/PLS OXIMETRY MLT: CPT

## 2017-03-16 PROCEDURE — 99222 1ST HOSP IP/OBS MODERATE 55: CPT | Mod: ,,, | Performed by: SURGERY

## 2017-03-16 RX ORDER — OXYCODONE AND ACETAMINOPHEN 5; 325 MG/1; MG/1
1 TABLET ORAL EVERY 6 HOURS PRN
Status: DISCONTINUED | OUTPATIENT
Start: 2017-03-16 | End: 2017-03-17 | Stop reason: HOSPADM

## 2017-03-16 RX ORDER — CEFEPIME HYDROCHLORIDE 1 G/50ML
1 INJECTION, SOLUTION INTRAVENOUS
Status: DISCONTINUED | OUTPATIENT
Start: 2017-03-16 | End: 2017-03-17

## 2017-03-16 RX ADMIN — VANCOMYCIN HYDROCHLORIDE 1250 MG: 100 INJECTION, POWDER, LYOPHILIZED, FOR SOLUTION INTRAVENOUS at 02:03

## 2017-03-16 RX ADMIN — IPRATROPIUM BROMIDE AND ALBUTEROL SULFATE 3 ML: .5; 3 SOLUTION RESPIRATORY (INHALATION) at 12:03

## 2017-03-16 RX ADMIN — AMIODARONE HYDROCHLORIDE 200 MG: 200 TABLET ORAL at 09:03

## 2017-03-16 RX ADMIN — CEFEPIME 1 G: 1 INJECTION, POWDER, FOR SOLUTION INTRAMUSCULAR; INTRAVENOUS at 10:03

## 2017-03-16 RX ADMIN — IPRATROPIUM BROMIDE AND ALBUTEROL SULFATE 3 ML: .5; 3 SOLUTION RESPIRATORY (INHALATION) at 07:03

## 2017-03-16 RX ADMIN — INSULIN ASPART 2 UNITS: 100 INJECTION, SOLUTION INTRAVENOUS; SUBCUTANEOUS at 11:03

## 2017-03-16 RX ADMIN — TAMSULOSIN HYDROCHLORIDE 0.4 MG: 0.4 CAPSULE ORAL at 08:03

## 2017-03-16 RX ADMIN — METOPROLOL SUCCINATE 25 MG: 25 TABLET, EXTENDED RELEASE ORAL at 08:03

## 2017-03-16 RX ADMIN — BUMETANIDE 2 MG: 1 TABLET ORAL at 08:03

## 2017-03-16 RX ADMIN — ACETAMINOPHEN 650 MG: 325 TABLET ORAL at 04:03

## 2017-03-16 RX ADMIN — PANTOPRAZOLE SODIUM 600 MG: 40 TABLET, DELAYED RELEASE ORAL at 08:03

## 2017-03-16 RX ADMIN — INSULIN DETEMIR 10 UNITS: 100 INJECTION, SOLUTION SUBCUTANEOUS at 08:03

## 2017-03-16 RX ADMIN — ATORVASTATIN CALCIUM 20 MG: 10 TABLET, FILM COATED ORAL at 08:03

## 2017-03-16 RX ADMIN — ASPIRIN 81 MG: 81 TABLET, COATED ORAL at 08:03

## 2017-03-16 RX ADMIN — INSULIN ASPART 1 UNITS: 100 INJECTION, SOLUTION INTRAVENOUS; SUBCUTANEOUS at 10:03

## 2017-03-16 RX ADMIN — AMIODARONE HYDROCHLORIDE 200 MG: 200 TABLET ORAL at 08:03

## 2017-03-16 RX ADMIN — LOSARTAN POTASSIUM 12.5 MG: 25 TABLET, FILM COATED ORAL at 08:03

## 2017-03-16 RX ADMIN — OXYCODONE HYDROCHLORIDE AND ACETAMINOPHEN 1 TABLET: 5; 325 TABLET ORAL at 12:03

## 2017-03-16 RX ADMIN — OXYCODONE HYDROCHLORIDE AND ACETAMINOPHEN 1 TABLET: 5; 325 TABLET ORAL at 07:03

## 2017-03-16 NOTE — PROGRESS NOTES
PROGRESS NOTE  HOSPITAL MEDICINE    Admit Date: 3/14/2017    Follow-up For:  Chest pain     Subjective:      HPI:    Patient is a 89 y.o. male with DM type II, old MI, CAD, bradycardia s/p PPM, CKD3, Low normal BP, Bladder and prostate CA , CHF with EF 30%, moderate to severe AS, on home oxygen at night who presented to ED with RLE pain, erythema and swelling - onset last night. Pt denies fever/chills. Denies SOB or weight gain. The pt also reports cough and congestion x one week. After asking, the pt reports he has experienced chest tightness for last 2 nights that awoke him from sleep rated 5/10 on scale-relieved with one dose of NTG. Pt denied any associated symptoms such as SOB, N/V or diaphoresis.   Of note, Pt had a Nuclear stress test 4/2016 which showed no reversible ischemia but a large fixed deficit consistent with myocardial injury. Cardiology recommended LHC at the time, however, pt and his family declined LHC and elected medical management.  In the ED, WBC normal, Xray showed diffuse edema without underlying bony abnormality. Venous doppler U/S showed no DVT. Troponin 0.5, EKG showed paced rhythm.     Hospital course    Patient was evaluated by cardiology and had an unremarkable evaluation .   Pacer was interrogated and was with normal function.   Right lower leg cellulitis worsened overnight, antibiotic were adjusted   CT of lower ext was ordered to check for possible underlying abscess    consult  Ortho and ID for evaluation       Review of Systems , RLE swelling and pain       Objective:      Vital Signs Range (Last 24H):  Temp:  [98.1 °F (36.7 °C)-98.8 °F (37.1 °C)]   Pulse:  [74-90]   Resp:  [18-22]   BP: ()/(48-74)   SpO2:  [92 %-98 %]     I & O (Last 24H):    Intake/Output Summary (Last 24 hours) at 03/16/17 1206  Last data filed at 03/16/17 0426   Gross per 24 hour   Intake              940 ml   Output              600 ml   Net              340 ml       Physical Exam   Constitutional:  Patient is oriented to person, place, and time. Appears well-developed and well-nourished.   Head: Normocephalic and atraumatic. Mucous membranes moist.  Neck: Neck supple. No mass  Cardiovascular: Normal rate and regular rhythm.    Pulmonary/Chest: Effort normal and clear to auscultation bilaterally. No respiratory distress.   Abdomen: Soft. Non-tender and non-distended. Bowel sounds are normal.   Neurological: Moves all extremities.  Skin: Warm and dry. No rashes.  Extremities: RLE swelling and erythema  with raised indurated area      Medications:  Current Facility-Administered Medications   Medication    acetaminophen tablet 650 mg    albuterol-ipratropium 2.5mg-0.5mg/3mL nebulizer solution 3 mL    amiodarone tablet 200 mg    aspirin EC tablet 81 mg    atorvastatin tablet 20 mg    bumetanide tablet 2 mg    cefepime in dextrose 5 % 1 gram/50 mL IVPB 1 g    dextrose 50% injection 12.5 g    dextrose 50% injection 25 g    glucagon (human recombinant) injection 1 mg    glucose chewable tablet 16 g    glucose chewable tablet 24 g    guaifenesin 12 hr tablet 600 mg    insulin aspart pen 0-5 Units    insulin detemir pen 10 Units    losartan split tablet 12.5 mg    metoprolol succinate (TOPROL-XL) 24 hr tablet 25 mg    oxycodone-acetaminophen 5-325 mg per tablet 1 tablet    tamsulosin 24 hr capsule 0.4 mg    vancomycin (VANCOCIN) 1,250 mg in dextrose 5 % 250 mL IVPB       Laboratory Data:  Reviewed and noted in plan where applicable. Please see chart for full laboratory data.    Lab Results   Component Value Date    WBC 7.71 03/16/2017    HGB 11.4 (L) 03/16/2017    HCT 35.4 (L) 03/16/2017    MCV 90 03/16/2017     03/16/2017         Recent Labs  Lab 03/16/17  0926   *      K 3.6      CO2 28   BUN 26   CREATININE 1.6*   CALCIUM 8.8       Microbiology Results (last 7 days)     Procedure Component Value Units Date/Time    Blood culture [[id number]] Collected:  03/14/17 2025     Order Status:  Completed Specimen:  Blood from Peripheral, Antecubital, Right Updated:  03/16/17 1012     Blood Culture, Routine No Growth to date     Blood Culture, Routine No Growth to date    Blood culture [[id number]] Collected:  03/14/17 2010    Order Status:  Completed Specimen:  Blood from Peripheral, Antecubital, Left Updated:  03/16/17 1012     Blood Culture, Routine No Growth to date     Blood Culture, Routine No Growth to date          Radiology:  Reviewed and noted in plan where applicable.  Please see chart for full radiology data.    ASSESSMENT/PLAN:     Active Hospital Problems    Diagnosis  POA    *Chest pain [R07.9]  Yes    Elevated troponin [R79.89]  Yes    Cellulitis of leg [L03.119]  Yes    NSVT (nonsustained ventricular tachycardia) [I47.2]  Yes    Chronic systolic CHF (congestive heart failure) [I50.22]  Yes    DM type 2, uncontrolled, with renal complications [E11.29, E11.65]  Yes     Chronic    COPD with acute exacerbation [J44.1]  Yes    HTN (hypertension) [I10]  Yes    CAD (coronary artery disease) [I25.10]  Yes    CKD (chronic kidney disease) stage 3, GFR 30-59 ml/min [N18.3]  Yes      Resolved Hospital Problems    Diagnosis Date Resolved POA   No resolved problems to display.      Chest pain  Pt has been offered [facility] for unstable angina one year ago and he and family declined-wanted medical management.   Serial troponin peaked -0.506  Cont ASA, Statin, BB, ARB       Cellulitis of leg   worsening   RLE cellulitis  IV Vancomycin and Zosyn  Blood cultures -NGTD    consult ID /ortho      COPD with acute exacerbation   stable no  wheezing  Neb txs  Mucinex  PRN           DM type 2, uncontrolled, with renal complications   fair control  Levemir  NISS        CKD (chronic kidney disease) stage 3, GFR 30-59 ml/min   stable  monitor        Chronic systolic CHF (congestive heart failure)   continue CHF core measures  Cont Bumex and ARB if BP tolerates        NSVT (nonsustained ventricular  tachycardia)  Cont amiodarone

## 2017-03-16 NOTE — ASSESSMENT & PLAN NOTE
Cont abx for cellulitis  Place warm compress to leg  Continue to monitor cellulitis for improvement

## 2017-03-16 NOTE — PROGRESS NOTES
Rajiv Russo [id number] is a 89 y.o. male who has been consulted for vancomycin dosing.    The patient has the following labs:     Date Creatinine (mg/dl)    BUN WBC Count   3/16/2017 Estimated Creatinine Clearance: 30.5 mL/min (based on Cr of 1.6). Lab Results   Component Value Date    BUN 26 03/16/2017     Lab Results   Component Value Date    WBC 7.71 03/16/2017        Current weight is 96.2 kg (212 lb)    Vancomycin trough from 3/15 was 5.5 mg/dL.  Target trough range is 15-20.  Pharmacy will increase dose.   The patient will be changed to a vancomycin dose of 1250 mg every 36 hours. A vancomycin trough has been ordered for 3/18 at 01:00.  Patient will be followed by pharmacy for changes in renal function, toxicity, and efficacy.    Thank you for allowing us to participate in this patient's care.     Dixie Marrero

## 2017-03-16 NOTE — CONSULTS
Consult Note  Cardiology    Consult Requested By: Dr. Dominguez  Reason for Consult: Chest pain, elevated troponin    SUBJECTIVE:     History of Present Illness:  Patient is a 89 y.o. male PMHx of type II DM, CAD, old MI, bradycardia s/p PPM, CKD stage III, bladder, AS, systolic CHF (EF=40%), and prostate CA who presented to [facility] ED on 3/15/17 with a chief complaint of RLE pain, erythema, and swelling. Patient denied any associated fever or chills but did admit to an episode of nocturnal chest pain two nights prior that was relieved with nitroglycerin. No other associated symptoms-patient denied any associated SOB, nausea, vomiting, or diaphoresis. Initial workup in ED revealed troponin of 0.506 and patient was subsequently admitted for further evaluation and treatment. Patient seen and examined today, resting in bed. He is well known to cardiology service and followed routinely by CHF clinic and Dr. Sanchez. Reports leg pain has improved since admission. Denies any cardiac complaints. No recurrence of chest pain, he has chronic intermittent angina for which he takes sublingual nitroglycerin. Management options (LHC vs medical therapy) previously discussed with patient and his family, and they wished to proceed with medical therapy only. Chart reviewed. Repeat troponin 0.385. EKG reviewed and showed paced rhythm with PVC's. Last 2D echo showed EF of 40%, moderate to severe AS. PPM interrogation from this AM revealed brief runs of PAF, low afib burden.     Review of patient's allergies indicates:  No Known Allergies    Past Medical History:   Diagnosis Date    Acute coronary syndrome     Bladder cancer     Bradycardia     CHF (congestive heart failure)     CKD (chronic kidney disease) stage 3, GFR 30-59 ml/min     Coronary artery disease     Diabetes mellitus     Heart attack     Hyperlipidemia     Hypertension     Macular degeneration     Pacemaker      Past Surgical History:   Procedure Laterality Date     BLADDER SURGERY      CARDIAC PACEMAKER PLACEMENT      CATARACT EXTRACTION      colon removal.        Family History   Problem Relation Age of Onset    No Known Problems Mother     No Known Problems Father     No Known Problems Sister     No Known Problems Brother     No Known Problems Maternal Aunt     No Known Problems Maternal Uncle     No Known Problems Paternal Aunt     No Known Problems Paternal Uncle     No Known Problems Maternal Grandmother     No Known Problems Maternal Grandfather     No Known Problems Paternal Grandmother     No Known Problems Paternal Grandfather     Amblyopia Neg Hx     Blindness Neg Hx     Cancer Neg Hx     Cataracts Neg Hx     Diabetes Neg Hx     Glaucoma Neg Hx     Hypertension Neg Hx     Macular degeneration Neg Hx     Retinal detachment Neg Hx     Strabismus Neg Hx     Stroke Neg Hx     Thyroid disease Neg Hx      Social History   Substance Use Topics    Smoking status: Former Smoker    Smokeless tobacco: None    Alcohol use No        Review of Systems:  Constitutional: negative  Eyes: negative  Ears, nose, mouth, throat, and face: negative  Respiratory: negative  Cardiovascular: +chronic chest pain  Gastrointestinal: negative  Genitourinary:negative  Hematologic/lym +right leg pain, swelling, erythema  Neurological: negative  Behavioral/Psych: negative  Endocrine: negative  Allergic/Immunologic: negative    OBJECTIVE:     Vital Signs (Most Recent)  Temp: 98.8 °F (37.1 °C) (03/16/17 0701)  Pulse: 78 (03/16/17 0745)  Resp: (!) 22 (03/16/17 0745)  BP: (!) 103/50 (03/16/17 0701)  SpO2: (!) 92 % (03/16/17 1100)    Vital Signs Range (Last 24H):  Temp:  [98.1 °F (36.7 °C)-98.8 °F (37.1 °C)]   Pulse:  [74-90]   Resp:  [18-22]   BP: ()/(48-74)   SpO2:  [92 %-98 %]     Current Facility-Administered Medications   Medication    acetaminophen tablet 650 mg    albuterol-ipratropium 2.5mg-0.5mg/3mL nebulizer solution 3 mL    amiodarone tablet 200 mg     "aspirin EC tablet 81 mg    atorvastatin tablet 20 mg    bumetanide tablet 2 mg    cefepime in dextrose 5 % 1 gram/50 mL IVPB 1 g    dextrose 50% injection 12.5 g    dextrose 50% injection 25 g    glucagon (human recombinant) injection 1 mg    glucose chewable tablet 16 g    glucose chewable tablet 24 g    guaifenesin 12 hr tablet 600 mg    insulin aspart pen 0-5 Units    insulin detemir pen 10 Units    losartan split tablet 12.5 mg    metoprolol succinate (TOPROL-XL) 24 hr tablet 25 mg    tamsulosin 24 hr capsule 0.4 mg    vancomycin (VANCOCIN) 1,250 mg in dextrose 5 % 250 mL IVPB     No current facility-administered medications on file prior to encounter.      Current Outpatient Prescriptions on File Prior to Encounter   Medication Sig    aspirin (ECOTRIN) 81 MG EC tablet Take 81 mg by mouth once daily.    atorvastatin (LIPITOR) 20 MG tablet Take 1 tablet (20 mg total) by mouth every evening.    bumetanide (BUMEX) 2 MG tablet Take 1 tablet (2 mg total) by mouth once daily.    fenofibrate 160 MG Tab Take 160 mg by mouth once daily.    glipiZIDE (GLUCOTROL) 2.5 MG TR24 Take 2.5 mg by mouth 2 (two) times daily with meals.     insulin glargine (LANTUS) 100 unit/mL injection Inject 15 Units into the skin 2 (two) times daily.     losartan (COZAAR) 25 MG tablet Take 0.5 tablets (12.5 mg total) by mouth once daily.    metoprolol succinate (TOPROL-XL) 25 MG 24 hr tablet Take 1 tablet (25 mg total) by mouth every evening.    potassium chloride SA (K-DUR,KLOR-CON) 10 MEQ tablet Take 10 mEq by mouth once.    amiodarone (PACERONE) 200 MG Tab Take 1 tablet (200 mg total) by mouth 2 (two) times daily.    BD INSULIN PEN NEEDLE UF MINI 31 gauge x 3/16" Ndle     cyanocobalamin 1,000 mcg/mL injection     nitroGLYCERIN (NITROSTAT) 0.4 MG SL tablet Place 1 tablet (0.4 mg total) under the tongue every 5 (five) minutes as needed for Chest pain.    tamsulosin (FLOMAX) 0.4 mg Cp24 Take 0.4 mg by mouth. "       Physical Exam:  General appearance: alert, appears stated age and cooperative  Head: Normocephalic, without obvious abnormality, atraumatic  Neck: no adenopathy, no carotid bruit, no JVD   Lungs:  Rhonchi with occasional wheezes  Chest wall: no tenderness  Heart: regular rate and rhythm, S1, S2 normal, +systolic murmur ULSB radiating to neck  Abdomen: soft, non-tender  Extremities: RLE with erythema   Skin: Skin color, texture, turgor normal. No rashes or lesions  Neurologic: Grossly normal, AAO x 3    Laboratory:  Chemistry:   Lab Results   Component Value Date     03/16/2017    K 3.6 03/16/2017     03/16/2017    CO2 28 03/16/2017    BUN 26 03/16/2017    CREATININE 1.6 (H) 03/16/2017    CALCIUM 8.8 03/16/2017     Cardiac Markers:   Lab Results   Component Value Date    CKMB 1.3 11/02/2011    TROPONINI 0.385 (H) 03/15/2017    TROPONINI <0.04 11/02/2011     Cardiac Markers (Last 3):   Lab Results   Component Value Date    CKMB 1.3 11/02/2011    CKMB 1.3 11/02/2011    CKMB 1.4 11/02/2011    TROPONINI 0.385 (H) 03/15/2017    TROPONINI 0.506 (H) 03/14/2017    TROPONINI 0.539 (H) 03/14/2017    TROPONINI <0.04 11/02/2011    TROPONINI <0.04 11/02/2011    TROPONINI <0.04 11/02/2011     CBC:   Lab Results   Component Value Date    WBC 7.71 03/16/2017    HGB 11.4 (L) 03/16/2017    HCT 35.4 (L) 03/16/2017    MCV 90 03/16/2017     03/16/2017     Lipids:   Lab Results   Component Value Date    CHOL 186 11/02/2011    TRIG 149 11/02/2011    HDL 45 11/02/2011     Coagulation:   Lab Results   Component Value Date    INR 1.1 03/14/2017    APTT 28.1 03/14/2017       Diagnostic Results:  ECG: Reviewed  X-Ray: Reviewed  Echo: Reviewed      ASSESSMENT/PLAN:     Patient Active Problem List   Diagnosis    Hypoxemia    COPD with acute exacerbation    CAD (coronary artery disease)    DM type 2, uncontrolled, with renal complications    HTN (hypertension)    Anemia    CKD (chronic kidney disease) stage 3, GFR  30-59 ml/min    Troponin I above reference range    Nonexudative senile macular degeneration of retina    Type 2 diabetes mellitus without complication    Ischemic chest pain    Angina of effort    Chest pain    Nonrheumatic aortic valve stenosis    Cardiomyopathy    Unstable angina    Chronic systolic CHF (congestive heart failure)    NSVT (nonsustained ventricular tachycardia)    Elevated troponin    Cellulitis of leg      Patient who presents with elevated troponin likely in part due to cellulitis. Cardiac wise, appears stable. He has chronic angina relieved with sublingual nitro and has elected to proceed with medical therapy. PPM interrogation this AM revealed low afib burden, therefore will keep patient on ASA only as he is also a fall risk. Continue current cardiac medications. Continue abx.   Plan:   -Continue current cardiac meds  -Abx for cellulitis  -Follow-up in clinic with PA next week    Chart reviewed. Dr. Sanchez examined patient and agrees with plan as outlined above.

## 2017-03-16 NOTE — PLAN OF CARE
Problem: Patient Care Overview  Goal: Plan of Care Review  Outcome: Ongoing (interventions implemented as appropriate)  Plan of care reviewed and updated

## 2017-03-16 NOTE — CONSULTS
"[facility] -   General Surgery  Consult Note  Consult: RLE abscess  Provider: Alberto    History of Present Illness:  89 y/o male admitted with RLE cellulitis referred for ? Abscess. He reports significant pain over the area that is improved with lortab. Denies any fever/chills. Ct done today shows cellulitis but no drainable fluid collection.    Post-Op Info:  * No surgery found *         No current facility-administered medications on file prior to encounter.      Current Outpatient Prescriptions on File Prior to Encounter   Medication Sig    aspirin (ECOTRIN) 81 MG EC tablet Take 81 mg by mouth once daily.    atorvastatin (LIPITOR) 20 MG tablet Take 1 tablet (20 mg total) by mouth every evening.    bumetanide (BUMEX) 2 MG tablet Take 1 tablet (2 mg total) by mouth once daily.    fenofibrate 160 MG Tab Take 160 mg by mouth once daily.    glipiZIDE (GLUCOTROL) 2.5 MG TR24 Take 2.5 mg by mouth 2 (two) times daily with meals.     insulin glargine (LANTUS) 100 unit/mL injection Inject 15 Units into the skin 2 (two) times daily.     losartan (COZAAR) 25 MG tablet Take 0.5 tablets (12.5 mg total) by mouth once daily.    metoprolol succinate (TOPROL-XL) 25 MG 24 hr tablet Take 1 tablet (25 mg total) by mouth every evening.    potassium chloride SA (K-DUR,KLOR-CON) 10 MEQ tablet Take 10 mEq by mouth once.    amiodarone (PACERONE) 200 MG Tab Take 1 tablet (200 mg total) by mouth 2 (two) times daily.    BD INSULIN PEN NEEDLE UF MINI 31 gauge x 3/16" Ndle     cyanocobalamin 1,000 mcg/mL injection     nitroGLYCERIN (NITROSTAT) 0.4 MG SL tablet Place 1 tablet (0.4 mg total) under the tongue every 5 (five) minutes as needed for Chest pain.    tamsulosin (FLOMAX) 0.4 mg Cp24 Take 0.4 mg by mouth.       Review of patient's allergies indicates:  No Known Allergies    Past Medical History:   Diagnosis Date    Acute coronary syndrome     Bladder cancer     Bradycardia     CHF (congestive heart failure)  "    CKD (chronic kidney disease) stage 3, GFR 30-59 ml/min     Coronary artery disease     Diabetes mellitus     Heart attack     Hyperlipidemia     Hypertension     Macular degeneration     Pacemaker      Past Surgical History:   Procedure Laterality Date    BLADDER SURGERY      CARDIAC PACEMAKER PLACEMENT      CATARACT EXTRACTION      colon removal.        Family History     Problem Relation (Age of Onset)    No Known Problems Mother, Father, Sister, Brother, Maternal Aunt, Maternal Uncle, Paternal Aunt, Paternal Uncle, Maternal Grandmother, Maternal Grandfather, Paternal Grandmother, Paternal Grandfather        Social History Main Topics    Smoking status: Former Smoker    Smokeless tobacco: Not on file    Alcohol use No    Drug use: No    Sexual activity: Not on file     Review of Systems   Constitutional: Negative for chills, fever and unexpected weight change.   HENT: Negative for congestion.    Eyes: Negative for visual disturbance.   Respiratory: Negative for shortness of breath.    Cardiovascular: Negative for chest pain.   Gastrointestinal: Negative for abdominal distention, abdominal pain, constipation, nausea, rectal pain and vomiting.   Genitourinary: Negative for dysuria.   Musculoskeletal: Negative for arthralgias.        RLE pain   Skin: Negative for rash.   Neurological: Negative for light-headedness.   Hematological: Negative for adenopathy.     Objective:     Vital Signs (Most Recent):  Temp: 98.2 °F (36.8 °C) (03/16/17 1600)  Pulse: 84 (03/16/17 1600)  Resp: 18 (03/16/17 1600)  BP: (!) 110/58 (03/16/17 1600)  SpO2: (!) 94 % (03/16/17 1600) Vital Signs (24h Range):  Temp:  [97.8 °F (36.6 °C)-98.8 °F (37.1 °C)] 98.2 °F (36.8 °C)  Pulse:  [74-88] 84  Resp:  [18-22] 18  SpO2:  [92 %-96 %] 94 %  BP: ()/(48-60) 110/58     Weight: 96.2 kg (212 lb)  Body mass index is 29.57 kg/(m^2).    Physical Exam   Constitutional: He is oriented to person, place, and time. He appears  well-developed and well-nourished.   HENT:   Head: Normocephalic and atraumatic.   Eyes: EOM are normal.   Neck: Normal range of motion. Neck supple.   Cardiovascular: Normal rate and regular rhythm.    Pulmonary/Chest: Effort normal and breath sounds normal.   Abdominal: Soft. Bowel sounds are normal. He exhibits no distension. There is no tenderness.   Musculoskeletal:   RLE cellulitis over shin, no fluctuance or drainable collections, ttp   Neurological: He is alert and oriented to person, place, and time.   Skin: Skin is warm and dry.   Vitals reviewed.      Significant Labs:  CBC:   Recent Labs  Lab 03/16/17  0926   WBC 7.71   RBC 3.94*   HGB 11.4*   HCT 35.4*      MCV 90   MCH 28.9   MCHC 32.2     BMP:   Recent Labs  Lab 03/16/17  0926   *      K 3.6      CO2 28   BUN 26   CREATININE 1.6*   CALCIUM 8.8     Coagulation:   Recent Labs  Lab 03/14/17  2030   INR 1.1   APTT 28.1         Significant Diagnostics:  CT: Right leg cellulitis.  No drainable abscess identified.  No CT evidence of osteomyelitis.     Assessment/Plan:     Cellulitis of leg  Cont abx for cellulitis  Place warm compress to leg  Continue to monitor cellulitis for improvement        Twin Melgoza MD  General Surgery  [facility] -

## 2017-03-16 NOTE — PLAN OF CARE
Problem: Patient Care Overview  Goal: Plan of Care Review  Outcome: Ongoing (interventions implemented as appropriate)  ra sat=96%; tolerates txs well.

## 2017-03-16 NOTE — PLAN OF CARE
CM met with patient to discuss discharge planning with son at the bedside.  The son states the patient lives with his and has 3 caregivers that come in the home for 5 hours in the morning and 5 hours in the evening.  The son states the only HME used by the patient is cane.  The family and caregiver transports the patient to all appointments.  CM educated on Obs vs IP.  CM advised on the potential for home health services.  Final discharge plan dependent upon needs after ortho consult.      03/16/17 1507   Discharge Assessment   Assessment Type Discharge Planning Assessment   Confirmed/corrected address and phone number on facesheet? Yes   Assessment information obtained from? Other  ([facility])   Expected Length of Stay (days) 3   Communicated expected length of stay with patient/caregiver yes   Prior to hospitilization cognitive status: Unable to Assess   Prior to hospitalization functional status: Independent   Current cognitive status: Unable to Assess   Current Functional Status: Independent   Arrived From home or self-care   Lives With spouse   Able to Return to Prior Arrangements yes   Is patient able to care for self after discharge? Unable to determine at this time (comments)   How many people do you have in your home that can help with your care after discharge? other (see comments)  (Patient has caregivers that comes into the home)   Patient's perception of discharge disposition home or selfcare   Readmission Within The Last 30 Days no previous admission in last 30 days   Patient currently being followed by outpatient case management? No   Patient currently receives home health services? No   Does the patient currently use HME? Yes   Patient currently receives private duty nursing? No   Patient currently receives any other outside agency services? No   Equipment Currently Used at Home cane, straight   Do you have any problems affording any of your prescribed medications? No   Is the patient taking medications as  prescribed? yes   Do you have any financial concerns preventing you from receiving the healthcare you need? No   Does the patient have transportation to healthcare appointments? Yes   Transportation Available family or friend will provide   On Dialysis? No   Does the patient receive services at the Coumadin Clinic? No   Are there any open cases? No   Discharge Plan A Home   Discharge Plan B Home   Patient/Family In Agreement With Plan yes

## 2017-03-16 NOTE — PLAN OF CARE
Problem: Patient Care Overview  Goal: Plan of Care Review  Outcome: Ongoing (interventions implemented as appropriate)  Free of falls/injury during shift  Minimal c/o pain  DM monitored and managed  NSR on telemetry occasional pacing  Abx therapy in place  PM interrogation this am  Safety interventions in place

## 2017-03-16 NOTE — SUBJECTIVE & OBJECTIVE
"No current facility-administered medications on file prior to encounter.      Current Outpatient Prescriptions on File Prior to Encounter   Medication Sig    aspirin (ECOTRIN) 81 MG EC tablet Take 81 mg by mouth once daily.    atorvastatin (LIPITOR) 20 MG tablet Take 1 tablet (20 mg total) by mouth every evening.    bumetanide (BUMEX) 2 MG tablet Take 1 tablet (2 mg total) by mouth once daily.    fenofibrate 160 MG Tab Take 160 mg by mouth once daily.    glipiZIDE (GLUCOTROL) 2.5 MG TR24 Take 2.5 mg by mouth 2 (two) times daily with meals.     insulin glargine (LANTUS) 100 unit/mL injection Inject 15 Units into the skin 2 (two) times daily.     losartan (COZAAR) 25 MG tablet Take 0.5 tablets (12.5 mg total) by mouth once daily.    metoprolol succinate (TOPROL-XL) 25 MG 24 hr tablet Take 1 tablet (25 mg total) by mouth every evening.    potassium chloride SA (K-DUR,KLOR-CON) 10 MEQ tablet Take 10 mEq by mouth once.    amiodarone (PACERONE) 200 MG Tab Take 1 tablet (200 mg total) by mouth 2 (two) times daily.    BD INSULIN PEN NEEDLE UF MINI 31 gauge x 3/16" Ndle     cyanocobalamin 1,000 mcg/mL injection     nitroGLYCERIN (NITROSTAT) 0.4 MG SL tablet Place 1 tablet (0.4 mg total) under the tongue every 5 (five) minutes as needed for Chest pain.    tamsulosin (FLOMAX) 0.4 mg Cp24 Take 0.4 mg by mouth.       Review of patient's allergies indicates:  No Known Allergies    Past Medical History:   Diagnosis Date    Acute coronary syndrome     Bladder cancer     Bradycardia     CHF (congestive heart failure)     CKD (chronic kidney disease) stage 3, GFR 30-59 ml/min     Coronary artery disease     Diabetes mellitus     Heart attack     Hyperlipidemia     Hypertension     Macular degeneration     Pacemaker      Past Surgical History:   Procedure Laterality Date    BLADDER SURGERY      CARDIAC PACEMAKER PLACEMENT      CATARACT EXTRACTION      colon removal.        Family History     Problem " Relation (Age of Onset)    No Known Problems Mother, Father, Sister, Brother, Maternal Aunt, Maternal Uncle, Paternal Aunt, Paternal Uncle, Maternal Grandmother, Maternal Grandfather, Paternal Grandmother, Paternal Grandfather        Social History Main Topics    Smoking status: Former Smoker    Smokeless tobacco: Not on file    Alcohol use No    Drug use: No    Sexual activity: Not on file     Review of Systems   Constitutional: Negative for chills, fever and unexpected weight change.   HENT: Negative for congestion.    Eyes: Negative for visual disturbance.   Respiratory: Negative for shortness of breath.    Cardiovascular: Negative for chest pain.   Gastrointestinal: Negative for abdominal distention, abdominal pain, constipation, nausea, rectal pain and vomiting.   Genitourinary: Negative for dysuria.   Musculoskeletal: Negative for arthralgias.        RLE pain   Skin: Negative for rash.   Neurological: Negative for light-headedness.   Hematological: Negative for adenopathy.     Objective:     Vital Signs (Most Recent):  Temp: 98.2 °F (36.8 °C) (03/16/17 1600)  Pulse: 84 (03/16/17 1600)  Resp: 18 (03/16/17 1600)  BP: (!) 110/58 (03/16/17 1600)  SpO2: (!) 94 % (03/16/17 1600) Vital Signs (24h Range):  Temp:  [97.8 °F (36.6 °C)-98.8 °F (37.1 °C)] 98.2 °F (36.8 °C)  Pulse:  [74-88] 84  Resp:  [18-22] 18  SpO2:  [92 %-96 %] 94 %  BP: ()/(48-60) 110/58     Weight: 96.2 kg (212 lb)  Body mass index is 29.57 kg/(m^2).    Physical Exam   Constitutional: He is oriented to person, place, and time. He appears well-developed and well-nourished.   HENT:   Head: Normocephalic and atraumatic.   Eyes: EOM are normal.   Neck: Normal range of motion. Neck supple.   Cardiovascular: Normal rate and regular rhythm.    Pulmonary/Chest: Effort normal and breath sounds normal.   Abdominal: Soft. Bowel sounds are normal. He exhibits no distension. There is no tenderness.   Musculoskeletal:   RLE cellulitis over shin, no  fluctuance or drainable collections, ttp   Neurological: He is alert and oriented to person, place, and time.   Skin: Skin is warm and dry.   Vitals reviewed.      Significant Labs:  CBC:   Recent Labs  Lab 03/16/17  0926   WBC 7.71   RBC 3.94*   HGB 11.4*   HCT 35.4*      MCV 90   MCH 28.9   MCHC 32.2     BMP:   Recent Labs  Lab 03/16/17  0926   *      K 3.6      CO2 28   BUN 26   CREATININE 1.6*   CALCIUM 8.8     Coagulation:   Recent Labs  Lab 03/14/17  2030   INR 1.1   APTT 28.1         Significant Diagnostics:  CT: Right leg cellulitis.  No drainable abscess identified.  No CT evidence of osteomyelitis.

## 2017-03-17 VITALS
SYSTOLIC BLOOD PRESSURE: 110 MMHG | DIASTOLIC BLOOD PRESSURE: 59 MMHG | TEMPERATURE: 98 F | BODY MASS INDEX: 29.68 KG/M2 | HEIGHT: 71 IN | WEIGHT: 212 LBS | OXYGEN SATURATION: 94 % | HEART RATE: 85 BPM | RESPIRATION RATE: 20 BRPM

## 2017-03-17 PROBLEM — I47.29 NSVT (NONSUSTAINED VENTRICULAR TACHYCARDIA): Status: RESOLVED | Noted: 2017-01-05 | Resolved: 2017-03-17

## 2017-03-17 PROBLEM — R79.89 ELEVATED TROPONIN: Status: RESOLVED | Noted: 2017-03-14 | Resolved: 2017-03-17

## 2017-03-17 LAB — POCT GLUCOSE: 169 MG/DL (ref 70–110)

## 2017-03-17 PROCEDURE — 96367 TX/PROPH/DG ADDL SEQ IV INF: CPT

## 2017-03-17 PROCEDURE — 25000003 PHARM REV CODE 250: Performed by: INTERNAL MEDICINE

## 2017-03-17 PROCEDURE — 25000003 PHARM REV CODE 250: Performed by: NURSE PRACTITIONER

## 2017-03-17 PROCEDURE — 96372 THER/PROPH/DIAG INJ SC/IM: CPT

## 2017-03-17 PROCEDURE — 96366 THER/PROPH/DIAG IV INF ADDON: CPT

## 2017-03-17 PROCEDURE — 96365 THER/PROPH/DIAG IV INF INIT: CPT

## 2017-03-17 PROCEDURE — 25000242 PHARM REV CODE 250 ALT 637 W/ HCPCS: Performed by: NURSE PRACTITIONER

## 2017-03-17 PROCEDURE — 82962 GLUCOSE BLOOD TEST: CPT

## 2017-03-17 PROCEDURE — 94640 AIRWAY INHALATION TREATMENT: CPT

## 2017-03-17 RX ORDER — OXYCODONE AND ACETAMINOPHEN 5; 325 MG/1; MG/1
1 TABLET ORAL EVERY 6 HOURS PRN
Qty: 21 TABLET | Refills: 0 | Status: SHIPPED | OUTPATIENT
Start: 2017-03-17 | End: 2017-03-21

## 2017-03-17 RX ORDER — LINEZOLID 600 MG/1
600 TABLET, FILM COATED ORAL EVERY 12 HOURS
Status: DISCONTINUED | OUTPATIENT
Start: 2017-03-17 | End: 2017-03-17 | Stop reason: HOSPADM

## 2017-03-17 RX ORDER — LINEZOLID 600 MG/1
600 TABLET, FILM COATED ORAL EVERY 12 HOURS
Qty: 20 TABLET | Refills: 0 | Status: SHIPPED | OUTPATIENT
Start: 2017-03-17 | End: 2017-03-27

## 2017-03-17 RX ADMIN — ASPIRIN 81 MG: 81 TABLET, COATED ORAL at 08:03

## 2017-03-17 RX ADMIN — OXYCODONE HYDROCHLORIDE AND ACETAMINOPHEN 1 TABLET: 5; 325 TABLET ORAL at 01:03

## 2017-03-17 RX ADMIN — IPRATROPIUM BROMIDE AND ALBUTEROL SULFATE 3 ML: .5; 3 SOLUTION RESPIRATORY (INHALATION) at 12:03

## 2017-03-17 RX ADMIN — INSULIN DETEMIR 10 UNITS: 100 INJECTION, SOLUTION SUBCUTANEOUS at 08:03

## 2017-03-17 RX ADMIN — PANTOPRAZOLE SODIUM 600 MG: 40 TABLET, DELAYED RELEASE ORAL at 08:03

## 2017-03-17 RX ADMIN — BUMETANIDE 2 MG: 1 TABLET ORAL at 08:03

## 2017-03-17 RX ADMIN — TAMSULOSIN HYDROCHLORIDE 0.4 MG: 0.4 CAPSULE ORAL at 08:03

## 2017-03-17 RX ADMIN — AMIODARONE HYDROCHLORIDE 200 MG: 200 TABLET ORAL at 08:03

## 2017-03-17 RX ADMIN — LOSARTAN POTASSIUM 12.5 MG: 25 TABLET, FILM COATED ORAL at 08:03

## 2017-03-17 RX ADMIN — OXYCODONE HYDROCHLORIDE AND ACETAMINOPHEN 1 TABLET: 5; 325 TABLET ORAL at 08:03

## 2017-03-17 RX ADMIN — IPRATROPIUM BROMIDE AND ALBUTEROL SULFATE 3 ML: .5; 3 SOLUTION RESPIRATORY (INHALATION) at 07:03

## 2017-03-17 NOTE — DISCHARGE SUMMARY
[facility] Hospital Medicine  Discharge Summary      Patient Name: Rajiv Russo  MRN: [id number]  Admission Date: 3/14/2017  Hospital Length of Stay: 1 days  Discharge Date and Time:  03/17/2017 10:03 AM  Attending Physician: Azucena Dominguez MD   Discharging Provider: Azucena Dominguez MD  Primary Care Provider: Dean Soto Jr, MD      HPI:   Patient is a 89 y.o. male with DM type II, old MI, CAD, bradycardia s/p PPM, CKD3, Low normal BP, Bladder and prostate CA , CHF with EF 30%, moderate to severe AS, on home oxygen at night who presented to ED with RLE pain, erythema and swelling - onset last night. Pt denies fever/chills. Denies SOB or weight gain. The pt also reports cough and congestion x one week. After asking, the pt reports he has experienced chest tightness for last 2 nights that awoke him from sleep rated 5/10 on scale-relieved with one dose of NTG. Pt denied any associated symptoms such as SOB, N/V or diaphoresis.   Of note, Pt had a Nuclear stress test 4/2016 which showed no reversible ischemia but a large fixed deficit consistent with myocardial injury. Cardiology recommended LHC at the time, however, pt and his family declined LHC and elected medical management.  In the ED, WBC normal, Xray showed diffuse edema without underlying bony abnormality. Venous doppler U/S showed no DVT. Troponin 0.5, EKG showed paced rhythm.       * No surgery found *      Indwelling Lines/Drains at time of discharge:   Lines/Drains/Airways          No matching active lines, drains, or airways        Hospital Course:   Patient was evaluated by cardiology and had an unremarkable evaluation .  Pacer was interrogated and was with normal function.  Right lower leg cellulitis worsened however improved with  antibiotic  adjustments  CT of lower ext was ordered to check for possible underlying abscess   Extensive subcutaneous edema throughout the anterior right lower extremity below the knee.  No  drainable fluid collection identified  Cultures reviewed and were negative growth to date.   Dr Ledbetter was consulted and recommended Zyvox 600 mg BID x 10 days       Consults:   Consults         Status Ordering Provider     Inpatient consult to Cardiology  Once     Provider:  Praveen Sanchez MD    Completed MEENA OWEN     Inpatient consult to General Surgery  Once     Provider:  Twin Melgoza MD    Completed MEENA OWEN     Inpatient consult to Infectious Diseases  Once     Provider:  Wyatt Ledbetter MD    Acknowledged MEENA OWEN     Pharmacy to dose Vancomycin consult  Once     Provider:  (Not yet assigned)    Acknowledged LILA MANNING          Significant Diagnostic Studies: Radiology: CT scan: CT scan RLE    Pending Diagnostic Studies:     None        Final Active Diagnoses:    Diagnosis Date Noted POA    Cellulitis of leg [L03.119] 03/14/2017 Yes    Chronic systolic CHF (congestive heart failure) [I50.22] 04/26/2016 Yes    DM type 2, uncontrolled, with renal complications [E11.29, E11.65] 08/24/2014 Yes     Chronic    HTN (hypertension) [I10] 08/24/2014 Yes    CAD (coronary artery disease) [I25.10] 08/24/2014 Yes    CKD (chronic kidney disease) stage 3, GFR 30-59 ml/min [N18.3] 08/24/2014 Yes      Problems Resolved During this Admission:    Diagnosis Date Noted Date Resolved POA    PRINCIPAL PROBLEM:  Chest pain [R07.9]  03/17/2017 Yes    Elevated troponin [R79.89] 03/14/2017 03/17/2017 Yes    NSVT (nonsustained ventricular tachycardia) [I47.2] 01/05/2017 03/17/2017 Yes    COPD with acute exacerbation [J44.1] 08/24/2014 03/17/2017 Yes      No new Assessment & Plan notes have been filed under this hospital service since the last note was generated.  Service: Hospital Medicine      Discharged Condition: good    Disposition: Home or Self Care    Follow Up:  Follow-up Information     Follow up with Dena Soto Jr, MD.    Specialty:  Family Medicine    Contact information:    [address]  [place] [address]  [phone]          Call Wyatt Ledbetter MD.    Specialty:  Infectious Diseases    Contact information:    [address]  [place] [address] [phone]          Patient Instructions:     Diet general       Medications:  Reconciled Home Medications:   Current Discharge Medication List      START taking these medications    Details   linezolid (ZYVOX) 600 mg Tab Take 1 tablet (600 mg total) by mouth every 12 (twelve) hours.  Qty: 20 tablet, Refills: 0      oxycodone-acetaminophen (PERCOCET) 5-325 mg per tablet Take 1 tablet by mouth every 6 (six) hours as needed.  Qty: 21 tablet, Refills: 0         CONTINUE these medications which have NOT CHANGED    Details   aspirin (ECOTRIN) 81 MG EC tablet Take 81 mg by mouth once daily.      atorvastatin (LIPITOR) 20 MG tablet Take 1 tablet (20 mg total) by mouth every evening.  Qty: 30 tablet, Refills: 6    Associated Diagnoses: Coronary artery disease of native artery of native heart with stable angina pectoris      bumetanide (BUMEX) 2 MG tablet Take 1 tablet (2 mg total) by mouth once daily.  Qty: 60 tablet, Refills: 6    Associated Diagnoses: Chronic systolic CHF (congestive heart failure)      fenofibrate 160 MG Tab Take 160 mg by mouth once daily.      glipiZIDE (GLUCOTROL) 2.5 MG TR24 Take 2.5 mg by mouth 2 (two) times daily with meals.       insulin glargine (LANTUS) 100 unit/mL injection Inject 15 Units into the skin 2 (two) times daily.       losartan (COZAAR) 25 MG tablet Take 0.5 tablets (12.5 mg total) by mouth once daily.  Qty: 90 tablet, Refills: 3      metoprolol succinate (TOPROL-XL) 25 MG 24 hr tablet Take 1 tablet (25 mg total) by mouth every evening.  Qty: 30 tablet, Refills: 0      potassium chloride SA (K-DUR,KLOR-CON) 10 MEQ tablet Take 10 mEq by mouth once.      amiodarone (PACERONE) 200 MG Tab Take 1 tablet (200 mg total) by mouth 2 (two) times daily.  Qty: 60 tablet, Refills: 11     "  BD INSULIN PEN NEEDLE UF MINI 31 gauge x 3/16" Ndle       cyanocobalamin 1,000 mcg/mL injection       nitroGLYCERIN (NITROSTAT) 0.4 MG SL tablet Place 1 tablet (0.4 mg total) under the tongue every 5 (five) minutes as needed for Chest pain.  Qty: 30 tablet, Refills: 6    Associated Diagnoses: Coronary artery disease involving native coronary artery of native heart without angina pectoris      tamsulosin (FLOMAX) 0.4 mg Cp24 Take 0.4 mg by mouth.           Time spent on the discharge of patient: >30 minutes    Azucena Dominguez MD  Department of Hospital Medicine  [facility]  "

## 2017-03-17 NOTE — SUBJECTIVE & OBJECTIVE
"Past Medical History:   Diagnosis Date    Acute coronary syndrome     Bladder cancer     Bradycardia     CHF (congestive heart failure)     CKD (chronic kidney disease) stage 3, GFR 30-59 ml/min     Coronary artery disease     Diabetes mellitus     Heart attack     Hyperlipidemia     Hypertension     Macular degeneration     Pacemaker        Past Surgical History:   Procedure Laterality Date    BLADDER SURGERY      CARDIAC PACEMAKER PLACEMENT      CATARACT EXTRACTION      colon removal.          Review of patient's allergies indicates:  No Known Allergies    Medications:  Prescriptions Prior to Admission   Medication Sig    aspirin (ECOTRIN) 81 MG EC tablet Take 81 mg by mouth once daily.    atorvastatin (LIPITOR) 20 MG tablet Take 1 tablet (20 mg total) by mouth every evening.    bumetanide (BUMEX) 2 MG tablet Take 1 tablet (2 mg total) by mouth once daily.    fenofibrate 160 MG Tab Take 160 mg by mouth once daily.    glipiZIDE (GLUCOTROL) 2.5 MG TR24 Take 2.5 mg by mouth 2 (two) times daily with meals.     insulin glargine (LANTUS) 100 unit/mL injection Inject 15 Units into the skin 2 (two) times daily.     losartan (COZAAR) 25 MG tablet Take 0.5 tablets (12.5 mg total) by mouth once daily.    metoprolol succinate (TOPROL-XL) 25 MG 24 hr tablet Take 1 tablet (25 mg total) by mouth every evening.    potassium chloride SA (K-DUR,KLOR-CON) 10 MEQ tablet Take 10 mEq by mouth once.    amiodarone (PACERONE) 200 MG Tab Take 1 tablet (200 mg total) by mouth 2 (two) times daily.    BD INSULIN PEN NEEDLE UF MINI 31 gauge x 3/16" Ndle     cyanocobalamin 1,000 mcg/mL injection     nitroGLYCERIN (NITROSTAT) 0.4 MG SL tablet Place 1 tablet (0.4 mg total) under the tongue every 5 (five) minutes as needed for Chest pain.    tamsulosin (FLOMAX) 0.4 mg Cp24 Take 0.4 mg by mouth.     Antibiotics     Start     Stop Route Frequency Ordered    03/16/17 1015  cefepime in dextrose 5 % 1 gram/50 mL IVPB 1 g   "    -- IV Every 12 hours (non-standard times) 03/16/17 0913    03/16/17 1400  vancomycin (VANCOCIN) 1,250 mg in dextrose 5 % 250 mL IVPB      -- IV Every 36 hours 03/16/17 1113        Antifungals     None        Antivirals     None           Immunization History   Administered Date(s) Administered    Pneumococcal Conjugate - 13 Valent 04/16/2016       Family History     Problem Relation (Age of Onset)    No Known Problems Mother, Father, Sister, Brother, Maternal Aunt, Maternal Uncle, Paternal Aunt, Paternal Uncle, Maternal Grandmother, Maternal Grandfather, Paternal Grandmother, Paternal Grandfather        Social History     Social History    Marital status:      Spouse name: N/A    Number of children: N/A    Years of education: N/A     Social History Main Topics    Smoking status: Former Smoker    Smokeless tobacco: None    Alcohol use No    Drug use: No    Sexual activity: Not Asked     Other Topics Concern    None     Social History Narrative     Review of Systems   Constitutional: Negative for appetite change, chills, diaphoresis, fatigue and fever.   HENT: Negative for congestion, nosebleeds, sore throat and trouble swallowing.    Eyes: Negative for pain, discharge and visual disturbance.   Respiratory: Positive for cough and wheezing. Negative for apnea, chest tightness, shortness of breath and stridor.    Cardiovascular: Positive for chest pain and leg swelling. Negative for palpitations.   Gastrointestinal: Negative for abdominal distention, abdominal pain, blood in stool, constipation, diarrhea, nausea and vomiting.   Endocrine: Negative for cold intolerance and heat intolerance.   Genitourinary: Negative for difficulty urinating, dysuria, flank pain, frequency and urgency.   Musculoskeletal: Negative for arthralgias, back pain, joint swelling, myalgias, neck pain and neck stiffness.   Skin: Positive for color change. Negative for rash and wound.        Pt reports pain, erythema and  swelling to right LE    Allergic/Immunologic: Negative for food allergies and immunocompromised state.   Neurological: Negative for dizziness, seizures, syncope, facial asymmetry, weakness, light-headedness and headaches.   Hematological: Negative for adenopathy.   Psychiatric/Behavioral: Negative for agitation, behavioral problems and confusion. The patient is not nervous/anxious.      Objective:     Vital Signs (Most Recent):  Temp: 98.2 °F (36.8 °C) (03/16/17 2323)  Pulse: 76 (03/17/17 0051)  Resp: 20 (03/17/17 0051)  BP: (!) 93/50 (03/16/17 2323)  SpO2: (!) 94 % (03/17/17 0051) Vital Signs (24h Range):  Temp:  [97.8 °F (36.6 °C)-98.8 °F (37.1 °C)] 98.2 °F (36.8 °C)  Pulse:  [76-97] 76  Resp:  [18-22] 20  SpO2:  [92 %-95 %] 94 %  BP: ()/(50-61) 93/50     Weight: 96.2 kg (212 lb)  Body mass index is 29.57 kg/(m^2).    Estimated Creatinine Clearance: 30.5 mL/min (based on Cr of 1.6).    Physical Exam   Constitutional: He is oriented to person, place, and time. He appears well-developed and well-nourished. No distress.   HENT:   Head: Normocephalic and atraumatic.   Nose: Nose normal.   Mouth/Throat: Oropharynx is clear and moist.   Eyes: Conjunctivae and EOM are normal. No scleral icterus.   Neck: Normal range of motion. Neck supple.   Cardiovascular: Normal rate, regular rhythm, normal heart sounds and intact distal pulses.  Exam reveals no gallop and no friction rub.    No murmur heard.  Pulmonary/Chest: Effort normal. No stridor. No respiratory distress. He has wheezes. He has no rales. He exhibits no tenderness.   Abdominal: Soft. Bowel sounds are normal. He exhibits no distension. There is no tenderness. There is no rebound and no guarding.   Musculoskeletal: Normal range of motion. He exhibits edema. He exhibits no tenderness or deformity.   RT LE edema    Neurological: He is alert and oriented to person, place, and time. No cranial nerve deficit. He exhibits normal muscle tone. Coordination normal.    Skin: Skin is warm and dry. No rash noted. He is not diaphoretic. There is erythema. No pallor.   TTP, erythema and edema RT LE    Psychiatric: He has a normal mood and affect. His behavior is normal. Thought content normal.   Nursing note and vitals reviewed.      Significant Labs:   Blood Culture:   Recent Labs  Lab 03/14/17 2010 03/14/17 2025   LABBLOO No Growth to date  No Growth to date No Growth to date  No Growth to date     CBC:   Recent Labs  Lab 03/15/17  0444 03/16/17  0926   WBC 7.70 7.71   HGB 10.8* 11.4*   HCT 34.2* 35.4*    179     CMP:   Recent Labs  Lab 03/16/17  0926      K 3.6      CO2 28   *   BUN 26   CREATININE 1.6*   CALCIUM 8.8   ANIONGAP 8   EGFRNONAA 35*     Urine Culture: No results for input(s): LABURIN in the last 4320 hours.  Urine Studies: No results for input(s): COLORU, APPEARANCEUA, PHUR, SPECGRAV, PROTEINUA, GLUCUA, KETONESU, BILIRUBINUA, OCCULTUA, NITRITE, UROBILINOGEN, LEUKOCYTESUR, RBCUA, WBCUA, BACTERIA, SQUAMEPITHEL, HYALINECASTS in the last 4320 hours.    Invalid input(s): ALLA    Significant Imaging: I have reviewed all pertinent imaging results/findings within the past 24 hours.

## 2017-03-17 NOTE — PLAN OF CARE
MANASA scheduled hospital follow up with Dr Soto for 3- at 10am.  Patient discharged from hospital before follow up scheduled.  MANASA contacted patients caretaker @[phone] and gave follow up appointment information.  MANASA also notified that Dr Ledbetter's office needed to be contacted to scheduled a follow up appointment.  Patient discharged home to self care with caregivers.     03/17/17 1102   Final Note   Assessment Type Final Discharge Note   Discharge Disposition Home   Hospital Follow Up  Appt(s) scheduled? Yes   Offered [facility] -- Bedside Delivery? n/a   Referral to Outpatient Case Management complete? n/a   Referral to / orders for Home Health [facility]? n/a   30 day supply of medicines given at discharge, if documented non-compliance / non-adherence? n/a

## 2017-03-17 NOTE — CONSULTS
[facility]  Infectious Disease  Consult Note    Patient Name: Rajiv Russo  MRN: [id number]  Admission Date: 3/14/2017  Hospital Length of Stay: 1 days  Attending Physician: Azucena Dominguez MD  Primary Care Provider: Dean Soto Jr, MD     Isolation Status: No active isolations    Patient information was obtained from patient, past medical records and ER records.      Consults  Assessment/Plan:     DM type 2, uncontrolled, with renal complications  Insulin sliding scale   Insulin levemir 10 units.    CKD (chronic kidney disease) stage 3, GFR 30-59 ml/min  Closely monitor serum creatinine .  Avoid nephrotoxic meds    Cellulitis of leg  Will use zyvox for 10 days      Thank you for your consult. I will follow-up with patient. Please contact us if you have any additional questions.    Wyatt Ledbetter MD  Infectious Disease  [facility]    Subjective:     Principal Problem: Chest pain    HPI: 89 year old man with history of diabetes ,previous MI I, CAD, bradycardia s/p PPM, CKD3, Low normal BP, Bladder and prostate CA , CHF with EF 30%, moderate to severe AS, on home oxygen at night who presented to ED with RLE pain, erythema and swelling -onset last night.  He was admitted with NSTEMI and cardiology is evaluating the patient .    Since admission, lower extremity Ct scan of the lower extremity showed cellulitis .  Lower extremity Doppler is neg for DVT.  He is being evaluated for right lower extremity cellulitis .  .        Past Medical History:   Diagnosis Date    Acute coronary syndrome     Bladder cancer     Bradycardia     CHF (congestive heart failure)     CKD (chronic kidney disease) stage 3, GFR 30-59 ml/min     Coronary artery disease     Diabetes mellitus     Heart attack     Hyperlipidemia     Hypertension     Macular degeneration     Pacemaker        Past Surgical History:   Procedure Laterality Date    BLADDER SURGERY      CARDIAC PACEMAKER PLACEMENT    "   CATARACT EXTRACTION      colon removal.          Review of patient's allergies indicates:  No Known Allergies    Medications:  Prescriptions Prior to Admission   Medication Sig    aspirin (ECOTRIN) 81 MG EC tablet Take 81 mg by mouth once daily.    atorvastatin (LIPITOR) 20 MG tablet Take 1 tablet (20 mg total) by mouth every evening.    bumetanide (BUMEX) 2 MG tablet Take 1 tablet (2 mg total) by mouth once daily.    fenofibrate 160 MG Tab Take 160 mg by mouth once daily.    glipiZIDE (GLUCOTROL) 2.5 MG TR24 Take 2.5 mg by mouth 2 (two) times daily with meals.     insulin glargine (LANTUS) 100 unit/mL injection Inject 15 Units into the skin 2 (two) times daily.     losartan (COZAAR) 25 MG tablet Take 0.5 tablets (12.5 mg total) by mouth once daily.    metoprolol succinate (TOPROL-XL) 25 MG 24 hr tablet Take 1 tablet (25 mg total) by mouth every evening.    potassium chloride SA (K-DUR,KLOR-CON) 10 MEQ tablet Take 10 mEq by mouth once.    amiodarone (PACERONE) 200 MG Tab Take 1 tablet (200 mg total) by mouth 2 (two) times daily.    BD INSULIN PEN NEEDLE UF MINI 31 gauge x 3/16" Ndle     cyanocobalamin 1,000 mcg/mL injection     nitroGLYCERIN (NITROSTAT) 0.4 MG SL tablet Place 1 tablet (0.4 mg total) under the tongue every 5 (five) minutes as needed for Chest pain.    tamsulosin (FLOMAX) 0.4 mg Cp24 Take 0.4 mg by mouth.     Antibiotics     Start     Stop Route Frequency Ordered    03/16/17 1015  cefepime in dextrose 5 % 1 gram/50 mL IVPB 1 g      -- IV Every 12 hours (non-standard times) 03/16/17 0913    03/16/17 1400  vancomycin (VANCOCIN) 1,250 mg in dextrose 5 % 250 mL IVPB      -- IV Every 36 hours 03/16/17 1113        Antifungals     None        Antivirals     None           Immunization History   Administered Date(s) Administered    Pneumococcal Conjugate - 13 Valent 04/16/2016       Family History     Problem Relation (Age of Onset)    No Known Problems Mother, Father, Sister, Brother, " Maternal Aunt, Maternal Uncle, Paternal Aunt, Paternal Uncle, Maternal Grandmother, Maternal Grandfather, Paternal Grandmother, Paternal Grandfather        Social History     Social History    Marital status:      Spouse name: N/A    Number of children: N/A    Years of education: N/A     Social History Main Topics    Smoking status: Former Smoker    Smokeless tobacco: None    Alcohol use No    Drug use: No    Sexual activity: Not Asked     Other Topics Concern    None     Social History Narrative     Review of Systems   Constitutional: Negative for appetite change, chills, diaphoresis, fatigue and fever.   HENT: Negative for congestion, nosebleeds, sore throat and trouble swallowing.    Eyes: Negative for pain, discharge and visual disturbance.   Respiratory: Positive for cough and wheezing. Negative for apnea, chest tightness, shortness of breath and stridor.    Cardiovascular: Positive for chest pain and leg swelling. Negative for palpitations.   Gastrointestinal: Negative for abdominal distention, abdominal pain, blood in stool, constipation, diarrhea, nausea and vomiting.   Endocrine: Negative for cold intolerance and heat intolerance.   Genitourinary: Negative for difficulty urinating, dysuria, flank pain, frequency and urgency.   Musculoskeletal: Negative for arthralgias, back pain, joint swelling, myalgias, neck pain and neck stiffness.   Skin: Positive for color change. Negative for rash and wound.        Pt reports pain, erythema and swelling to right LE    Allergic/Immunologic: Negative for food allergies and immunocompromised state.   Neurological: Negative for dizziness, seizures, syncope, facial asymmetry, weakness, light-headedness and headaches.   Hematological: Negative for adenopathy.   Psychiatric/Behavioral: Negative for agitation, behavioral problems and confusion. The patient is not nervous/anxious.      Objective:     Vital Signs (Most Recent):  Temp: 98.2 °F (36.8 °C) (03/16/17  2323)  Pulse: 76 (03/17/17 0051)  Resp: 20 (03/17/17 0051)  BP: (!) 93/50 (03/16/17 2323)  SpO2: (!) 94 % (03/17/17 0051) Vital Signs (24h Range):  Temp:  [97.8 °F (36.6 °C)-98.8 °F (37.1 °C)] 98.2 °F (36.8 °C)  Pulse:  [76-97] 76  Resp:  [18-22] 20  SpO2:  [92 %-95 %] 94 %  BP: ()/(50-61) 93/50     Weight: 96.2 kg (212 lb)  Body mass index is 29.57 kg/(m^2).    Estimated Creatinine Clearance: 30.5 mL/min (based on Cr of 1.6).    Physical Exam   Constitutional: He is oriented to person, place, and time. He appears well-developed and well-nourished. No distress.   HENT:   Head: Normocephalic and atraumatic.   Nose: Nose normal.   Mouth/Throat: Oropharynx is clear and moist.   Eyes: Conjunctivae and EOM are normal. No scleral icterus.   Neck: Normal range of motion. Neck supple.   Cardiovascular: Normal rate, regular rhythm, normal heart sounds and intact distal pulses.  Exam reveals no gallop and no friction rub.    No murmur heard.  Pulmonary/Chest: Effort normal. No stridor. No respiratory distress. He has wheezes. He has no rales. He exhibits no tenderness.   Abdominal: Soft. Bowel sounds are normal. He exhibits no distension. There is no tenderness. There is no rebound and no guarding.   Musculoskeletal: Normal range of motion. He exhibits edema. He exhibits no tenderness or deformity.   RT LE edema    Neurological: He is alert and oriented to person, place, and time. No cranial nerve deficit. He exhibits normal muscle tone. Coordination normal.   Skin: Skin is warm and dry. No rash noted. He is not diaphoretic. There is erythema. No pallor.   TTP, erythema and edema RT LE        Psychiatric: He has a normal mood and affect. His behavior is normal. Thought content normal.   Nursing note and vitals reviewed.      Significant Labs:   Blood Culture:   Recent Labs  Lab 03/14/17 2010 03/14/17 2025   LABBLOO No Growth to date  No Growth to date No Growth to date  No Growth to date     CBC:   Recent Labs  Lab  03/15/17  0444 03/16/17  0926   WBC 7.70 7.71   HGB 10.8* 11.4*   HCT 34.2* 35.4*    179     CMP:   Recent Labs  Lab 03/16/17  0926      K 3.6      CO2 28   *   BUN 26   CREATININE 1.6*   CALCIUM 8.8   ANIONGAP 8   EGFRNONAA 35*     Urine Culture: No results for input(s): LABURIN in the last 4320 hours.  Urine Studies: No results for input(s): COLORU, APPEARANCEUA, PHUR, SPECGRAV, PROTEINUA, GLUCUA, KETONESU, BILIRUBINUA, OCCULTUA, NITRITE, UROBILINOGEN, LEUKOCYTESUR, RBCUA, WBCUA, BACTERIA, SQUAMEPITHEL, HYALINECASTS in the last 4320 hours.    Invalid input(s): ALLA    Significant Imaging: I have reviewed all pertinent imaging results/findings within the past 24 hours.

## 2017-03-17 NOTE — PLAN OF CARE
Problem: Patient Care Overview  Goal: Plan of Care Review  Outcome: Ongoing (interventions implemented as appropriate)  Free of falls/injury during shift  Pain managed w/ PRN meds  DM monitored and managed  NSR on telemetry occasional pacing  Abx therapy in place  PM interrogation this am  Cellulitis to RLE worsened and ID consulted  Safety interventions in place

## 2017-03-20 ENCOUNTER — PATIENT OUTREACH (OUTPATIENT)
Dept: ADMINISTRATIVE | Facility: CLINIC | Age: 82
End: 2017-03-20
Payer: COMMERCIAL

## 2017-03-20 LAB
BACTERIA BLD CULT: NORMAL
BACTERIA BLD CULT: NORMAL

## 2017-03-20 NOTE — PATIENT INSTRUCTIONS
Discharge Instructions for Cellulitis  You have been diagnosed with cellulitis. This is an infection in the deepest layer of the skin. In some cases, the infection also affects the muscle. Cellulitis is caused by bacteria. The bacteria can enter the body through broken skin. This can happen with a cut, scratch, animal bite, or an insect bite that has been scratched. You may have been treated in the hospital with antibiotics and fluids. You will likely be given a prescription for antibiotics to take at home. This sheet will help you take care of yourself at home.  Home care  When you are home:  · Take the prescribed antibiotic medicine you are given as directed until it is gone. Take it even if you feel better. It treats the infection and stops it from returning. Not taking all the medicine can make future infections hard to treat.  · Keep the infected area clean.  · When possible, raise the infected area above the level of your heart. This helps keep swelling down.  · Talk with your healthcare provider if you are in pain. Ask what kind of over-the-counter medicine you can take for pain.  · Apply clean bandages as advised.  · Take your temperature once a day for a week.  · Wash your hands often to prevent spreading the infection.  In the future, wash your hands before and after you touch cuts, scratches, or bandages. This will help prevent infection.   When to call your healthcare provider  Call your healthcare provider immediately if you have any of the following:  · Difficulty or pain when moving the joints above or below the infected area  · Discharge or pus draining from the area  · Fever of 100.4°F (38°C) or higher, or as directed by your healthcare provider  · Pain that gets worse in or around the infected   · Redness that gets worse in or around the infected area, particularly if the area of redness expands to a wider area  · Shaking chills  · Swelling of the infected area  · Vomiting   Date Last Reviewed:  8/1/2016  © 4644-7680 [employer]. [address] [place] [address], [place] [address]. All rights reserved. This information is not intended as a substitute for professional medical care. Always follow your healthcare professional's instructions.

## 2017-03-21 ENCOUNTER — HOSPITAL ENCOUNTER (EMERGENCY)
Facility: HOSPITAL | Age: 82
Discharge: HOME | End: 2017-03-21
Attending: EMERGENCY MEDICINE
Payer: COMMERCIAL

## 2017-03-21 ENCOUNTER — LAB VISIT (OUTPATIENT)
Dept: LAB | Facility: HOSPITAL | Age: 82
End: 2017-03-21
Attending: INTERNAL MEDICINE
Payer: COMMERCIAL

## 2017-03-21 ENCOUNTER — TREATMENT PLANNING (OUTPATIENT)
Dept: INFECTIOUS DISEASES | Facility: HOSPITAL | Age: 82
End: 2017-03-21

## 2017-03-21 VITALS
BODY MASS INDEX: 29.12 KG/M2 | DIASTOLIC BLOOD PRESSURE: 53 MMHG | SYSTOLIC BLOOD PRESSURE: 122 MMHG | WEIGHT: 208 LBS | HEART RATE: 80 BPM | RESPIRATION RATE: 18 BRPM | HEIGHT: 71 IN | TEMPERATURE: 98 F | OXYGEN SATURATION: 97 %

## 2017-03-21 DIAGNOSIS — L02.91 ABSCESS: ICD-10-CM

## 2017-03-21 DIAGNOSIS — R09.02 HYPOXIA: ICD-10-CM

## 2017-03-21 DIAGNOSIS — L03.115 CELLULITIS OF RIGHT LEG: Primary | ICD-10-CM

## 2017-03-21 DIAGNOSIS — L02.91 ABSCESS: Primary | ICD-10-CM

## 2017-03-21 DIAGNOSIS — L02.415 ABSCESS OF RIGHT LEG: ICD-10-CM

## 2017-03-21 LAB
ALBUMIN SERPL BCP-MCNC: 3.2 G/DL
ALP SERPL-CCNC: 51 U/L
ALT SERPL W/O P-5'-P-CCNC: 9 U/L
ANION GAP SERPL CALC-SCNC: 9 MMOL/L
AST SERPL-CCNC: 17 U/L
BASOPHILS # BLD AUTO: 0.01 K/UL
BASOPHILS NFR BLD: 0.1 %
BILIRUB SERPL-MCNC: 0.4 MG/DL
BUN SERPL-MCNC: 26 MG/DL
CALCIUM SERPL-MCNC: 9.2 MG/DL
CHLORIDE SERPL-SCNC: 99 MMOL/L
CO2 SERPL-SCNC: 29 MMOL/L
CREAT SERPL-MCNC: 1.6 MG/DL
DIFFERENTIAL METHOD: ABNORMAL
EOSINOPHIL # BLD AUTO: 0.2 K/UL
EOSINOPHIL NFR BLD: 3.2 %
ERYTHROCYTE [DISTWIDTH] IN BLOOD BY AUTOMATED COUNT: 13.7 %
EST. GFR  (AFRICAN AMERICAN): 41 ML/MIN/1.73 M^2
EST. GFR  (NON AFRICAN AMERICAN): 35 ML/MIN/1.73 M^2
GLUCOSE SERPL-MCNC: 201 MG/DL
HCT VFR BLD AUTO: 35.1 %
HGB BLD-MCNC: 11.1 G/DL
LACTATE SERPL-SCNC: 1.1 MMOL/L
LYMPHOCYTES # BLD AUTO: 1.1 K/UL
LYMPHOCYTES NFR BLD: 14.2 %
MCH RBC QN AUTO: 28.5 PG
MCHC RBC AUTO-ENTMCNC: 31.6 %
MCV RBC AUTO: 90 FL
MONOCYTES # BLD AUTO: 0.8 K/UL
MONOCYTES NFR BLD: 10.3 %
NEUTROPHILS # BLD AUTO: 5.4 K/UL
NEUTROPHILS NFR BLD: 72.2 %
PLATELET # BLD AUTO: 270 K/UL
PMV BLD AUTO: 9.2 FL
POTASSIUM SERPL-SCNC: 4 MMOL/L
PROT SERPL-MCNC: 7 G/DL
RBC # BLD AUTO: 3.9 M/UL
SODIUM SERPL-SCNC: 137 MMOL/L
WBC # BLD AUTO: 7.45 K/UL

## 2017-03-21 PROCEDURE — 80053 COMPREHEN METABOLIC PANEL: CPT

## 2017-03-21 PROCEDURE — 83605 ASSAY OF LACTIC ACID: CPT

## 2017-03-21 PROCEDURE — 87186 SC STD MICRODIL/AGAR DIL: CPT

## 2017-03-21 PROCEDURE — 87077 CULTURE AEROBIC IDENTIFY: CPT

## 2017-03-21 PROCEDURE — 87070 CULTURE OTHR SPECIMN AEROBIC: CPT

## 2017-03-21 PROCEDURE — 87040 BLOOD CULTURE FOR BACTERIA: CPT | Mod: 59

## 2017-03-21 PROCEDURE — 85025 COMPLETE CBC W/AUTO DIFF WBC: CPT

## 2017-03-21 PROCEDURE — 10060 I&D ABSCESS SIMPLE/SINGLE: CPT

## 2017-03-21 PROCEDURE — 87075 CULTR BACTERIA EXCEPT BLOOD: CPT

## 2017-03-21 PROCEDURE — 25000003 PHARM REV CODE 250: Performed by: EMERGENCY MEDICINE

## 2017-03-21 PROCEDURE — 99284 EMERGENCY DEPT VISIT MOD MDM: CPT | Mod: 25

## 2017-03-21 RX ORDER — OXYCODONE AND ACETAMINOPHEN 5; 325 MG/1; MG/1
1 TABLET ORAL EVERY 6 HOURS PRN
Qty: 12 TABLET | Refills: 0 | Status: SHIPPED | OUTPATIENT
Start: 2017-03-21 | End: 2017-12-11

## 2017-03-21 RX ORDER — LIDOCAINE HYDROCHLORIDE 10 MG/ML
10 INJECTION INFILTRATION; PERINEURAL
Status: COMPLETED | OUTPATIENT
Start: 2017-03-21 | End: 2017-03-21

## 2017-03-21 RX ORDER — HYDROCODONE BITARTRATE AND ACETAMINOPHEN 10; 325 MG/1; MG/1
1 TABLET ORAL
Status: COMPLETED | OUTPATIENT
Start: 2017-03-21 | End: 2017-03-21

## 2017-03-21 RX ADMIN — LIDOCAINE HYDROCHLORIDE 10 ML: 10 INJECTION, SOLUTION INFILTRATION; PERINEURAL at 01:03

## 2017-03-21 RX ADMIN — HYDROCODONE BITARTRATE AND ACETAMINOPHEN 1 TABLET: 10; 325 TABLET ORAL at 01:03

## 2017-03-21 NOTE — ED AVS SNAPSHOT
[facility]  [address] [facility] [address]               Rajiv Russo   3/21/2017 12:32 PM   ED    Description:  Male : 3/15/1919   Department:  [facility]            Your Care was Coordinated By:     Provider Role From To    Radha Medel MD Attending Provider 17 1234 --      Reason for Visit     Abscess           Diagnoses this Visit        Comments    Cellulitis of right leg    -  Primary     Hypoxia         Abscess of right leg           ED Disposition     None           To Do List           Follow-up Information     Follow up with Wyatt Ledbetter MD. Schedule an appointment as soon as possible for a visit in 2 days.    Specialty:  Infectious Diseases    Why:  Return to the Emergency Room, If symptoms worsen    Contact information:    [address] [facility] [address]  [phone]         These Medications        Disp Refills Start End    oxycodone-acetaminophen (PERCOCET) 5-325 mg per tablet 12 tablet 0 3/21/2017     Take 1 tablet by mouth every 6 (six) hours as needed. - Oral    Pharmacy: [facility] [id number] [place] [address] Ph #: [phone]         [facility]     [facility] Care [facility] -  Assistance  Registered nurses in the [facility] provide clinical advisement, health education, appointment booking, and other advisory services.  Call for this free service at [phone].             Medications           Message regarding Medications     Verify the changes and/or additions to your medication regime listed below are the same as discussed with your clinician today.  If any of these changes or additions are incorrect, please notify your healthcare provider.        These medications were administered today        Dose Freq    lidocaine HCL 10 mg/ml (1%) injection 10 mL 10 mL ED 1 Time    Sig: 10 mLs by Infiltration route ED 1 Time.    Class: Normal     "Route: Infiltration    hydrocodone-acetaminophen 10-325mg per tablet 1 tablet 1 tablet ED 1 Time    Sig: Take 1 tablet by mouth ED 1 Time.    Class: Normal    Route: Oral           Verify that the below list of medications is an accurate representation of the medications you are currently taking.  If none reported, the list may be blank. If incorrect, please contact your healthcare provider. Carry this list with you in case of emergency.           Current Medications     amiodarone (PACERONE) 200 MG Tab Take 1 tablet (200 mg total) by mouth 2 (two) times daily.    aspirin (ECOTRIN) 81 MG EC tablet Take 81 mg by mouth once daily.    atorvastatin (LIPITOR) 20 MG tablet Take 1 tablet (20 mg total) by mouth every evening.    BD INSULIN PEN NEEDLE UF MINI 31 gauge x 3/16" Ndle     bumetanide (BUMEX) 2 MG tablet Take 1 tablet (2 mg total) by mouth once daily.    cyanocobalamin 1,000 mcg/mL injection     fenofibrate 160 MG Tab Take 160 mg by mouth once daily.    glipiZIDE (GLUCOTROL) 2.5 MG TR24 Take 2.5 mg by mouth 2 (two) times daily with meals.     insulin glargine (LANTUS) 100 unit/mL injection Inject 15 Units into the skin 2 (two) times daily.     linezolid (ZYVOX) 600 mg Tab Take 1 tablet (600 mg total) by mouth every 12 (twelve) hours.    losartan (COZAAR) 25 MG tablet Take 0.5 tablets (12.5 mg total) by mouth once daily.    metoprolol succinate (TOPROL-XL) 25 MG 24 hr tablet Take 1 tablet (25 mg total) by mouth every evening.    nitroGLYCERIN (NITROSTAT) 0.4 MG SL tablet Place 1 tablet (0.4 mg total) under the tongue every 5 (five) minutes as needed for Chest pain.    oxycodone-acetaminophen (PERCOCET) 5-325 mg per tablet Take 1 tablet by mouth every 6 (six) hours as needed.    potassium chloride SA (K-DUR,KLOR-CON) 10 MEQ tablet Take 10 mEq by mouth once.    tamsulosin (FLOMAX) 0.4 mg Cp24 Take 0.4 mg by mouth.           Clinical Reference Information           Your Vitals Were     BP Pulse Temp Resp Height Weight " "   128/56 73 98.1 °F (36.7 °C) (Oral) 15 5' 11" (1.803 m) 94.3 kg (208 lb)    SpO2 BMI             96% 29.01 kg/m2         Allergies as of 3/21/2017     No Known Allergies      Immunizations Administered on Date of Encounter - 3/21/2017     None      ED Micro, Lab, POCT     Start Ordered       Status Ordering Provider    03/21/17 1252 03/21/17 1251  Blood Culture #2 **CANNOT BE ORDERED STAT**  Once      In process     03/21/17 1251 03/21/17 1251  CBC auto differential  STAT      Final result     03/21/17 1251 03/21/17 1251  Comprehensive metabolic panel  STAT      Final result     03/21/17 1251 03/21/17 1251  Lactic acid, plasma  STAT      Final result     03/21/17 1251 03/21/17 1251  Blood Culture #1 **CANNOT BE ORDERED STAT**  Once      In process       ED Imaging Orders     Start Ordered       Status Ordering Provider    03/21/17 1319 03/21/17 1318  CT Lower Extremity Without Cont Right  1 time imaging      Acknowledged     03/21/17 1254 03/21/17 1253  X-Ray Chest AP Portable  1 time imaging      Final result       Discharge References/Attachments     ABSCESS, INCISION AND DRAINAGE (ENGLISH)    CELLULITIS, DISCHARGE INSTRUCTIONS FOR (ENGLISH)      Your Scheduled Appointments     Mar 30, 2017  1:00 PM CDT   Established Patient Visit with EDITH Dao   [facility]    [id number] [place] [address] [phone]              Smoking Cessation     If you would like to quit smoking:   You may be eligible for free services if you are a [place] resident and started smoking cigarettes before September 1, 1988.  Call the Smoking Cessation [employer] (SCT) toll free at (074) 065-8289 or (141) 859-9654.   Call 6-800-QUIT-NOW if you do not meet the above criteria.             [facility]  complies with applicable Federal civil rights laws and does not discriminate on the basis of race, color, national origin, age, disability, or sex.        Language Assistance " Services     ATTENTION: Language assistance services are available, free of charge. Please call [phone].      ATENCIÓN: Si habla español, tiene a montgomery disposición servicios gratuitos de asistencia lingüística. Llame al [phone].     CHÚ Ý: N?u b?n nói Ti?ng Vi?t, có các d?ch v? h? tr? ngôn ng? mi?n phí dành cho b?n. G?i s? [phone].

## 2017-03-21 NOTE — PROGRESS NOTES
89 year old man who was recently seen in the hospital and discharged on zyvox for cellulitis .  He presented today with purulent drainage from the right lower extremity .    Initial BP was 70/40 but later improved to 101/45.  Pertinent exam-        He had I and D done at his PCP yesterday.  Plan  1 Will need I and D and evaluation for   possible early sepsis as he was hypotensive in the office.  Cultures taken at the office.  Will need more imaging to rule out deeper infection ? Bone involvement .  Case discussed with ED -Dr Pastrana .

## 2017-03-22 RX ORDER — CIPROFLOXACIN 500 MG/1
500 TABLET ORAL 2 TIMES DAILY
Qty: 20 TABLET | Refills: 0 | Status: SHIPPED | OUTPATIENT
Start: 2017-03-22 | End: 2017-07-14

## 2017-03-23 LAB — BACTERIA SPEC AEROBE CULT: NORMAL

## 2017-03-26 LAB — BACTERIA BLD CULT: NORMAL

## 2017-03-27 LAB
BACTERIA BLD CULT: NORMAL
BACTERIA SPEC ANAEROBE CULT: NORMAL

## 2017-03-30 ENCOUNTER — OFFICE VISIT (OUTPATIENT)
Dept: CARDIOLOGY | Facility: CLINIC | Age: 82
End: 2017-03-30
Payer: COMMERCIAL

## 2017-03-30 VITALS
SYSTOLIC BLOOD PRESSURE: 116 MMHG | WEIGHT: 210.19 LBS | HEIGHT: 71 IN | DIASTOLIC BLOOD PRESSURE: 58 MMHG | HEART RATE: 74 BPM | BODY MASS INDEX: 29.43 KG/M2

## 2017-03-30 DIAGNOSIS — R09.02 HYPOXEMIA: ICD-10-CM

## 2017-03-30 DIAGNOSIS — I42.9 CARDIOMYOPATHY, UNSPECIFIED TYPE: ICD-10-CM

## 2017-03-30 DIAGNOSIS — I10 ESSENTIAL HYPERTENSION: ICD-10-CM

## 2017-03-30 DIAGNOSIS — I20.89 ANGINA OF EFFORT: ICD-10-CM

## 2017-03-30 DIAGNOSIS — N18.30 CKD (CHRONIC KIDNEY DISEASE) STAGE 3, GFR 30-59 ML/MIN: ICD-10-CM

## 2017-03-30 DIAGNOSIS — I25.118 CORONARY ARTERY DISEASE OF NATIVE ARTERY OF NATIVE HEART WITH STABLE ANGINA PECTORIS: Primary | ICD-10-CM

## 2017-03-30 PROCEDURE — 1159F MED LIST DOCD IN RCRD: CPT | Mod: S$GLB,,, | Performed by: PHYSICIAN ASSISTANT

## 2017-03-30 PROCEDURE — 1126F AMNT PAIN NOTED NONE PRSNT: CPT | Mod: S$GLB,,, | Performed by: PHYSICIAN ASSISTANT

## 2017-03-30 PROCEDURE — 99214 OFFICE O/P EST MOD 30 MIN: CPT | Mod: S$GLB,,, | Performed by: PHYSICIAN ASSISTANT

## 2017-03-30 PROCEDURE — 1157F ADVNC CARE PLAN IN RCRD: CPT | Mod: S$GLB,,, | Performed by: PHYSICIAN ASSISTANT

## 2017-03-30 PROCEDURE — 1160F RVW MEDS BY RX/DR IN RCRD: CPT | Mod: S$GLB,,, | Performed by: PHYSICIAN ASSISTANT

## 2017-03-30 PROCEDURE — 99999 PR PBB SHADOW E&M-EST. PATIENT-LVL III: CPT | Mod: PBBFAC,,, | Performed by: PHYSICIAN ASSISTANT

## 2017-03-30 NOTE — MR AVS SNAPSHOT
[facility]  [address] [facility] [address]  Phone: [phone]  Fax: [phone]                  Rajiv Russo   3/30/2017 1:00 PM   Office Visit    Description:  Male : 3/15/1919   Provider:  EDITH Dao   Department:  [facility]           Diagnoses this Visit        Comments    Angina of effort    -  Primary     Coronary artery disease of native artery of native heart with stable angina pectoris         Cardiomyopathy, unspecified type         Essential hypertension         CKD (chronic kidney disease) stage 3, GFR 30-59 ml/min         Hypoxemia                To Do List           Future Appointments        Provider Department Dept Phone    2017 11:45 AM [facility] [phone]      Goals (5 Years of Data)     None      [facility]     [facility] Care [facility] -  Assistance  Unless otherwise directed by your provider, please contact [facility], our nurse care line that is available for  assistance.     Registered nurses in the [facility] provide: appointment scheduling, clinical advisement, health education, and other advisory services.  Call: [phone] (toll free)               Medications           Message regarding Medications     Verify the changes and/or additions to your medication regime listed below are the same as discussed with your clinician today.  If any of these changes or additions are incorrect, please notify your healthcare provider.             Verify that the below list of medications is an accurate representation of the medications you are currently taking.  If none reported, the list may be blank. If incorrect, please contact your healthcare provider. Carry this list with you in case of emergency.           Current Medications     amiodarone (PACERONE) 200 MG Tab Take 1 tablet (200 mg total) by mouth 2 (two) times daily.    aspirin  "(ECOTRIN) 81 MG EC tablet Take 81 mg by mouth once daily.    atorvastatin (LIPITOR) 20 MG tablet Take 1 tablet (20 mg total) by mouth every evening.    BD INSULIN PEN NEEDLE UF MINI 31 gauge x 3/16" Ndle     bumetanide (BUMEX) 2 MG tablet Take 1 tablet (2 mg total) by mouth once daily.    ciprofloxacin HCl (CIPRO) 500 MG tablet Take 1 tablet (500 mg total) by mouth 2 (two) times daily.    cyanocobalamin 1,000 mcg/mL injection     fenofibrate 160 MG Tab Take 160 mg by mouth once daily.    glipiZIDE (GLUCOTROL) 2.5 MG TR24 Take 2.5 mg by mouth 2 (two) times daily with meals.     insulin glargine (LANTUS) 100 unit/mL injection Inject 15 Units into the skin 2 (two) times daily.     losartan (COZAAR) 25 MG tablet Take 0.5 tablets (12.5 mg total) by mouth once daily.    metoprolol succinate (TOPROL-XL) 25 MG 24 hr tablet Take 1 tablet (25 mg total) by mouth every evening.    nitroGLYCERIN (NITROSTAT) 0.4 MG SL tablet Place 1 tablet (0.4 mg total) under the tongue every 5 (five) minutes as needed for Chest pain.    oxycodone-acetaminophen (PERCOCET) 5-325 mg per tablet Take 1 tablet by mouth every 6 (six) hours as needed.    potassium chloride SA (K-DUR,KLOR-CON) 10 MEQ tablet Take 10 mEq by mouth once.    tamsulosin (FLOMAX) 0.4 mg Cp24 Take 0.4 mg by mouth.           Clinical Reference Information           Your Vitals Were     BP Pulse Height Weight BMI    116/58 (BP Location: Right arm, Patient Position: Sitting, BP Method: Manual) 74 5' 11" (1.803 m) 95.4 kg (210 lb 3.3 oz) 29.32 kg/m2      Blood Pressure          Most Recent Value    BP  (!)  116/58      Allergies as of 3/30/2017     No Known Allergies      Immunizations Administered on Date of Encounter - 3/30/2017     None      Language Assistance Services     ATTENTION: Language assistance services are available, free of charge. Please call [phone].      ATENCIÓN: Si habla nainaañol, tiene a montgomery disposición servicios gratuitos de asistencia lingüística. Llame " al [phone].     JOANN Ý: N?u b?n nói Ti?ng Vi?t, có các d?ch v? h? tr? ngôn ng? mi?n phí dành cho b?n. G?i s? [phone].         [facility] complies with applicable Federal civil rights laws and does not discriminate on the basis of race, color, national origin, age, disability, or sex.

## 2017-03-30 NOTE — PROGRESS NOTES
Subjective:    Patient ID:  Rajiv Russo is a 89 y.o. male who presents for follow-up of hospital follow-up    HPI  Mr. Russo is a 89 year old patient with a PMHx of type II DM, CAD, old MI, bradycardia s/p PPM, CKD stage III, bladder, and prostate CA who presents today for hospital follow-up. Patient previously hospitalized at [facility] with LE cellulitis and elevated troponin. He was started on abx and cardiac meds were continued and he was discharged. He returns today and states he is doing well. Left lower extremity still healing but patient reports redness and soreness are gradually improving. Still reports pain with exertion. Other than that issue, patient seems to be doing well. Reports he stopped Losartan due to low BP. Taking other medications. Denies any recent episodes of chest pain or chest tightness. CHF wise,appears compensated. Denies any worsening SOB, PND, orthopnea, or increased lower extremity edema. He reports compliance with his medications and is mindful of his salt intake. PPM  Interrogation revealed low burden afib, ASA only recommended due to high risk for falls. Discussed with patient at today's visit.      As referenced in previous notes, patient and family have elected conservative management of AS/angina. For now, he is still on board with conservative management.        Review of Systems   Constitution: Negative for chills, decreased appetite, fever, weakness and malaise/fatigue.   HENT: Negative for congestion, headaches, hoarse voice and sore throat.    Eyes: Negative for blurred vision and discharge.   Cardiovascular: Positive for dyspnea on exertion (stable). Negative for chest pain, claudication, cyanosis, irregular heartbeat, leg swelling, near-syncope, orthopnea, palpitations and paroxysmal nocturnal dyspnea.   Respiratory: Positive for shortness of breath. Negative for cough, hemoptysis, snoring, sputum production and wheezing.    Endocrine: Negative for cold intolerance  "and heat intolerance.   Hematologic/Lymphatic: Negative for bleeding problem. Does not bruise/bleed easily.   Skin: Negative for rash.   Musculoskeletal: Positive for arthritis and joint pain (left leg). Negative for back pain, joint swelling, muscle cramps, muscle weakness and myalgias.   Gastrointestinal: Negative for abdominal pain, constipation, diarrhea, heartburn, melena and nausea.   Genitourinary: Negative for hematuria.   Neurological: Negative for dizziness, focal weakness, light-headedness, loss of balance, numbness, paresthesias and seizures.   Psychiatric/Behavioral: Negative for memory loss. The patient does not have insomnia.    Allergic/Immunologic: Negative for hives.       BP (!) 116/58 (BP Location: Right arm, Patient Position: Sitting, BP Method: Manual)  Pulse 74 Comment: radial  Ht 5' 11" (1.803 m)  Wt 95.4 kg (210 lb 3.3 oz)  BMI 29.32 kg/m2    Objective:    Physical Exam   Constitutional: He is oriented to person, place, and time. He appears well-developed and well-nourished. No distress.   HENT:   Head: Normocephalic and atraumatic.   Eyes: Pupils are equal, round, and reactive to light. Right eye exhibits no discharge. Left eye exhibits no discharge.   Neck: Neck supple. No JVD present. No tracheal deviation present. No thyromegaly present.   Cardiovascular: Normal rate, regular rhythm, S1 normal, S2 normal and intact distal pulses.  PMI is not displaced.  Exam reveals no gallop, no S3, no S4 and no friction rub.    Murmur heard.   Harsh midsystolic murmur is present  at the upper right sternal border radiating to the neck  Pulses:       Radial pulses are 2+ on the right side, and 2+ on the left side.   Pulmonary/Chest: Effort normal and breath sounds normal. No respiratory distress. He has no wheezes. He has no rales.   PPM site well-healed   Abdominal: Soft. He exhibits no distension. There is no tenderness. There is no rebound.   Musculoskeletal: He exhibits no edema.   Neurological: " He is alert and oriented to person, place, and time.   Skin: Skin is warm and dry. He is not diaphoretic. No erythema.   LLE with mild erythema and swelling, dressing in place with serous drainage     Psychiatric: He has a normal mood and affect. His behavior is normal. Thought content normal.   Nursing note and vitals reviewed.      2D Echo CONCLUSIONS     1 - Moderate left atrial enlargement.     2 - Concentric remodeling.     3 - Mildly to moderately depressed left ventricular systolic function (EF 40-45%).     4 - Normal right ventricular systolic function .     5 - The estimated PA systolic pressure is greater than 26 mmHg.     6 - Moderate to severe aortic stenosis, KASSIDY = 0.8 cm2, mean gradient = 18.0 mmHg.     7 - Trivial aortic regurgitation.     8 - Moderate left atrial enlargement.     9 - Concentric remodeling.     10 - Mildly to moderately depressed left ventricular systolic function (EF 40-45%).     11 - Normal right ventricular systolic function .     12 - The estimated PA systolic pressure is greater than 26 mmHg.     13 - Moderate to severe aortic stenosis, KASSIDY = 0.8 cm2, mean gradient = 18.0 mmHg.     14 - Trivial aortic regurgitation.     Assessment:       1. Coronary artery disease of native artery of native heart with stable angina pectoris    2. Angina of effort    3. Cardiomyopathy, unspecified type    4. Essential hypertension    5. CKD (chronic kidney disease) stage 3, GFR 30-59 ml/min    6. Hypoxemia       Patient doing clinically well cardiac wise. No issues. Still recovering from LLE cellulitis. Elevated troponin from hospital admission multifactorial likely secondary to to infectious process and CKD, patient and family are agreeable to conservative management as noted in previous encounters. Continue current meds, ok to d/c losartan due to low BP.  Plan:   -Continue current medical management and risk factor modification  -Cardiac, low salt diet  -Ok to hold Losartan  -Continue abx  -Eat  K rich food since on Bumex and off ARB  -Follow-up with Dr. Sanchez in 3 months  -Lipid, cmp through [facility] in next month    Chart reviewed. Dr. Sanchez agrees with plan as outlined above.

## 2017-04-04 ENCOUNTER — HOSPITAL ENCOUNTER (OUTPATIENT)
Dept: RADIOLOGY | Facility: HOSPITAL | Age: 82
Discharge: HOME | End: 2017-04-04
Attending: INTERNAL MEDICINE
Payer: MEDICARE

## 2017-04-04 DIAGNOSIS — L02.91 ABSCESS: ICD-10-CM

## 2017-04-04 DIAGNOSIS — L02.91 ABSCESS: Primary | ICD-10-CM

## 2017-04-04 PROCEDURE — 73700 CT LOWER EXTREMITY W/O DYE: CPT | Mod: TC,RT

## 2017-04-12 ENCOUNTER — TELEPHONE (OUTPATIENT)
Dept: CARDIOLOGY | Facility: CLINIC | Age: 82
End: 2017-04-12

## 2017-04-12 DIAGNOSIS — J44.9 CHRONIC OBSTRUCTIVE PULMONARY DISEASE, UNSPECIFIED COPD TYPE: Primary | ICD-10-CM

## 2017-04-12 NOTE — TELEPHONE ENCOUNTER
----- Message from Keke Stanley sent at 4/12/2017 10:21 AM CDT -----  Contact: pt sitter  Call pt sydni (Vidya Jaimes)at [phone]////Dr Soto want to take away his oxygen and he say he need it and he want to talk with you about it because he is the one that put him on oxygen//thks ht

## 2017-04-12 NOTE — TELEPHONE ENCOUNTER
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  They state since you ordered it, what to you advise.

## 2017-04-13 NOTE — TELEPHONE ENCOUNTER
Spoke to daughter, Cintia, and she will call back to set up 6 min test when she looks at their schedule.

## 2017-04-19 ENCOUNTER — TELEPHONE (OUTPATIENT)
Dept: CARDIOLOGY | Facility: CLINIC | Age: 82
End: 2017-04-19

## 2017-04-19 NOTE — TELEPHONE ENCOUNTER
Correct, it was interrogated in hospital, we can cancel tmw's appointment, who is company so we can notify them and get a recheck moved further out

## 2017-06-28 ENCOUNTER — HOSPITAL ENCOUNTER (INPATIENT)
Facility: HOSPITAL | Age: 82
LOS: 2 days | Discharge: HOME HEALTH | DRG: 189 | End: 2017-06-30
Attending: SPECIALIST | Admitting: INTERNAL MEDICINE
Payer: MEDICARE

## 2017-06-28 DIAGNOSIS — J96.01 ACUTE RESPIRATORY FAILURE WITH HYPOXIA AND HYPERCAPNIA: Primary | ICD-10-CM

## 2017-06-28 DIAGNOSIS — I50.21 ACUTE SYSTOLIC CONGESTIVE HEART FAILURE: ICD-10-CM

## 2017-06-28 DIAGNOSIS — J96.02 ACUTE RESPIRATORY FAILURE WITH HYPOXIA AND HYPERCAPNIA: Primary | ICD-10-CM

## 2017-06-28 DIAGNOSIS — I50.22 CHRONIC SYSTOLIC CHF (CONGESTIVE HEART FAILURE): ICD-10-CM

## 2017-06-28 LAB
ALBUMIN SERPL BCP-MCNC: 3.8 G/DL
ALLENS TEST: ABNORMAL
ALP SERPL-CCNC: 38 U/L
ALT SERPL W/O P-5'-P-CCNC: 12 U/L
ANION GAP SERPL CALC-SCNC: 9 MMOL/L
APTT BLDCRRT: 26.6 SEC
AST SERPL-CCNC: 24 U/L
BACTERIA #/AREA URNS HPF: NORMAL /HPF
BASOPHILS # BLD AUTO: 0.01 K/UL
BASOPHILS NFR BLD: 0.1 %
BILIRUB SERPL-MCNC: 0.5 MG/DL
BILIRUB UR QL STRIP: NEGATIVE
BNP SERPL-MCNC: 1471 PG/ML
BUN SERPL-MCNC: 34 MG/DL
CALCIUM SERPL-MCNC: 9.2 MG/DL
CHLORIDE SERPL-SCNC: 92 MMOL/L
CK MB SERPL-MCNC: 4.4 NG/ML
CK MB SERPL-RTO: 2.7 %
CK SERPL-CCNC: 162 U/L
CK SERPL-CCNC: 162 U/L
CLARITY UR: CLEAR
CO2 SERPL-SCNC: 37 MMOL/L
COLOR UR: YELLOW
CREAT SERPL-MCNC: 2 MG/DL
DELSYS: ABNORMAL
DELSYS: ABNORMAL
DIFFERENTIAL METHOD: ABNORMAL
EOSINOPHIL # BLD AUTO: 0.2 K/UL
EOSINOPHIL NFR BLD: 2.2 %
ERYTHROCYTE [DISTWIDTH] IN BLOOD BY AUTOMATED COUNT: 15.5 %
ERYTHROCYTE [SEDIMENTATION RATE] IN BLOOD BY WESTERGREN METHOD: 3 MM/H
EST. GFR  (AFRICAN AMERICAN): 31 ML/MIN/1.73 M^2
EST. GFR  (NON AFRICAN AMERICAN): 27 ML/MIN/1.73 M^2
FIO2: 28
FIO2: 30
FLOW: 2
GLUCOSE SERPL-MCNC: 223 MG/DL
GLUCOSE UR QL STRIP: NEGATIVE
HCO3 UR-SCNC: 36.2 MMOL/L (ref 24–28)
HCO3 UR-SCNC: 41.6 MMOL/L (ref 24–28)
HCT VFR BLD AUTO: 41.2 %
HGB BLD-MCNC: 12.7 G/DL
HGB UR QL STRIP: ABNORMAL
INR PPP: 1.2
KETONES UR QL STRIP: NEGATIVE
LEUKOCYTE ESTERASE UR QL STRIP: NEGATIVE
LYMPHOCYTES # BLD AUTO: 1.1 K/UL
LYMPHOCYTES NFR BLD: 13.4 %
MAGNESIUM SERPL-MCNC: 2.3 MG/DL
MCH RBC QN AUTO: 28 PG
MCHC RBC AUTO-ENTMCNC: 30.8 %
MCV RBC AUTO: 91 FL
MICROSCOPIC COMMENT: NORMAL
MODE: ABNORMAL
MODE: ABNORMAL
MONOCYTES # BLD AUTO: 0.9 K/UL
MONOCYTES NFR BLD: 11.2 %
NEUTROPHILS # BLD AUTO: 5.9 K/UL
NEUTROPHILS NFR BLD: 73.1 %
NITRITE UR QL STRIP: NEGATIVE
PCO2 BLDA: 64.6 MMHG (ref 35–45)
PCO2 BLDA: 72.8 MMHG (ref 35–45)
PH SMN: 7.36 [PH] (ref 7.35–7.45)
PH SMN: 7.37 [PH] (ref 7.35–7.45)
PH UR STRIP: 6 [PH] (ref 5–8)
PLATELET # BLD AUTO: 190 K/UL
PMV BLD AUTO: 9.8 FL
PO2 BLDA: 58 MMHG (ref 80–100)
PO2 BLDA: 62 MMHG (ref 80–100)
POC BE: 11 MMOL/L
POC BE: 16 MMOL/L
POC SATURATED O2: 87 % (ref 95–100)
POC SATURATED O2: 89 % (ref 95–100)
POCT GLUCOSE: 196 MG/DL (ref 70–110)
POTASSIUM SERPL-SCNC: 4 MMOL/L
PROT SERPL-MCNC: 7.2 G/DL
PROT UR QL STRIP: NEGATIVE
PROTHROMBIN TIME: 12 SEC
RBC # BLD AUTO: 4.54 M/UL
RBC #/AREA URNS HPF: 3 /HPF (ref 0–4)
SAMPLE: ABNORMAL
SAMPLE: ABNORMAL
SITE: ABNORMAL
SITE: ABNORMAL
SODIUM SERPL-SCNC: 138 MMOL/L
SP GR UR STRIP: 1.02 (ref 1–1.03)
TROPONIN I SERPL DL<=0.01 NG/ML-MCNC: 0.04 NG/ML
TROPONIN I SERPL DL<=0.01 NG/ML-MCNC: 0.05 NG/ML
URN SPEC COLLECT METH UR: ABNORMAL
UROBILINOGEN UR STRIP-ACNC: NEGATIVE EU/DL
WBC # BLD AUTO: 8.01 K/UL

## 2017-06-28 PROCEDURE — 85610 PROTHROMBIN TIME: CPT

## 2017-06-28 PROCEDURE — 83036 HEMOGLOBIN GLYCOSYLATED A1C: CPT

## 2017-06-28 PROCEDURE — 36600 WITHDRAWAL OF ARTERIAL BLOOD: CPT

## 2017-06-28 PROCEDURE — 84484 ASSAY OF TROPONIN QUANT: CPT

## 2017-06-28 PROCEDURE — 85730 THROMBOPLASTIN TIME PARTIAL: CPT

## 2017-06-28 PROCEDURE — 27000221 HC OXYGEN, UP TO 24 HOURS

## 2017-06-28 PROCEDURE — 63600175 PHARM REV CODE 636 W HCPCS: Performed by: INTERNAL MEDICINE

## 2017-06-28 PROCEDURE — 99285 EMERGENCY DEPT VISIT HI MDM: CPT | Mod: 25

## 2017-06-28 PROCEDURE — 93005 ELECTROCARDIOGRAM TRACING: CPT

## 2017-06-28 PROCEDURE — 25000003 PHARM REV CODE 250: Performed by: INTERNAL MEDICINE

## 2017-06-28 PROCEDURE — 99900035 HC TECH TIME PER 15 MIN (STAT)

## 2017-06-28 PROCEDURE — 82553 CREATINE MB FRACTION: CPT

## 2017-06-28 PROCEDURE — 83880 ASSAY OF NATRIURETIC PEPTIDE: CPT

## 2017-06-28 PROCEDURE — 81000 URINALYSIS NONAUTO W/SCOPE: CPT

## 2017-06-28 PROCEDURE — 94640 AIRWAY INHALATION TREATMENT: CPT

## 2017-06-28 PROCEDURE — 63600175 PHARM REV CODE 636 W HCPCS: Performed by: SPECIALIST

## 2017-06-28 PROCEDURE — 27000190 HC CPAP FULL FACE MASK W/VALVE

## 2017-06-28 PROCEDURE — 84484 ASSAY OF TROPONIN QUANT: CPT | Mod: 91

## 2017-06-28 PROCEDURE — 25000242 PHARM REV CODE 250 ALT 637 W/ HCPCS: Performed by: SPECIALIST

## 2017-06-28 PROCEDURE — 83735 ASSAY OF MAGNESIUM: CPT

## 2017-06-28 PROCEDURE — 20000000 HC ICU ROOM

## 2017-06-28 PROCEDURE — 80053 COMPREHEN METABOLIC PANEL: CPT

## 2017-06-28 PROCEDURE — 94660 CPAP INITIATION&MGMT: CPT

## 2017-06-28 PROCEDURE — 85025 COMPLETE CBC W/AUTO DIFF WBC: CPT

## 2017-06-28 PROCEDURE — 36415 COLL VENOUS BLD VENIPUNCTURE: CPT

## 2017-06-28 PROCEDURE — 93010 ELECTROCARDIOGRAM REPORT: CPT | Mod: S$GLB,,, | Performed by: INTERNAL MEDICINE

## 2017-06-28 PROCEDURE — 82803 BLOOD GASES ANY COMBINATION: CPT

## 2017-06-28 PROCEDURE — 96374 THER/PROPH/DIAG INJ IV PUSH: CPT

## 2017-06-28 RX ORDER — ATORVASTATIN CALCIUM 10 MG/1
20 TABLET, FILM COATED ORAL NIGHTLY
Status: DISCONTINUED | OUTPATIENT
Start: 2017-06-28 | End: 2017-06-30 | Stop reason: HOSPADM

## 2017-06-28 RX ORDER — FUROSEMIDE 10 MG/ML
60 INJECTION INTRAMUSCULAR; INTRAVENOUS
Status: COMPLETED | OUTPATIENT
Start: 2017-06-28 | End: 2017-06-28

## 2017-06-28 RX ORDER — GLUCAGON 1 MG
1 KIT INJECTION
Status: DISCONTINUED | OUTPATIENT
Start: 2017-06-28 | End: 2017-06-30 | Stop reason: HOSPADM

## 2017-06-28 RX ORDER — HEPARIN SODIUM 5000 [USP'U]/ML
5000 INJECTION, SOLUTION INTRAVENOUS; SUBCUTANEOUS EVERY 8 HOURS
Status: DISCONTINUED | OUTPATIENT
Start: 2017-06-28 | End: 2017-06-30 | Stop reason: HOSPADM

## 2017-06-28 RX ORDER — INSULIN ASPART 100 [IU]/ML
1-10 INJECTION, SOLUTION INTRAVENOUS; SUBCUTANEOUS
Status: DISCONTINUED | OUTPATIENT
Start: 2017-06-28 | End: 2017-06-30 | Stop reason: HOSPADM

## 2017-06-28 RX ORDER — ASPIRIN 81 MG/1
81 TABLET ORAL DAILY
Status: DISCONTINUED | OUTPATIENT
Start: 2017-06-29 | End: 2017-06-30 | Stop reason: HOSPADM

## 2017-06-28 RX ORDER — IPRATROPIUM BROMIDE AND ALBUTEROL SULFATE 2.5; .5 MG/3ML; MG/3ML
3 SOLUTION RESPIRATORY (INHALATION)
Status: COMPLETED | OUTPATIENT
Start: 2017-06-28 | End: 2017-06-28

## 2017-06-28 RX ORDER — AMIODARONE HYDROCHLORIDE 200 MG/1
200 TABLET ORAL 2 TIMES DAILY
Status: DISCONTINUED | OUTPATIENT
Start: 2017-06-28 | End: 2017-06-30 | Stop reason: HOSPADM

## 2017-06-28 RX ORDER — IBUPROFEN 200 MG
16 TABLET ORAL
Status: DISCONTINUED | OUTPATIENT
Start: 2017-06-28 | End: 2017-06-30 | Stop reason: HOSPADM

## 2017-06-28 RX ORDER — IBUPROFEN 200 MG
24 TABLET ORAL
Status: DISCONTINUED | OUTPATIENT
Start: 2017-06-28 | End: 2017-06-30 | Stop reason: HOSPADM

## 2017-06-28 RX ORDER — FUROSEMIDE 10 MG/ML
40 INJECTION INTRAMUSCULAR; INTRAVENOUS 2 TIMES DAILY
Status: DISCONTINUED | OUTPATIENT
Start: 2017-06-29 | End: 2017-06-30 | Stop reason: HOSPADM

## 2017-06-28 RX ORDER — OXYCODONE AND ACETAMINOPHEN 5; 325 MG/1; MG/1
1 TABLET ORAL EVERY 6 HOURS PRN
Status: DISCONTINUED | OUTPATIENT
Start: 2017-06-28 | End: 2017-06-30 | Stop reason: HOSPADM

## 2017-06-28 RX ORDER — TAMSULOSIN HYDROCHLORIDE 0.4 MG/1
0.4 CAPSULE ORAL DAILY
Status: DISCONTINUED | OUTPATIENT
Start: 2017-06-29 | End: 2017-06-30 | Stop reason: HOSPADM

## 2017-06-28 RX ADMIN — HEPARIN SODIUM 5000 UNITS: 5000 INJECTION, SOLUTION INTRAVENOUS; SUBCUTANEOUS at 10:06

## 2017-06-28 RX ADMIN — IPRATROPIUM BROMIDE AND ALBUTEROL SULFATE 3 ML: .5; 3 SOLUTION RESPIRATORY (INHALATION) at 03:06

## 2017-06-28 RX ADMIN — ATORVASTATIN CALCIUM 20 MG: 10 TABLET, FILM COATED ORAL at 09:06

## 2017-06-28 RX ADMIN — AMIODARONE HYDROCHLORIDE 200 MG: 200 TABLET ORAL at 09:06

## 2017-06-28 RX ADMIN — INSULIN DETEMIR 10 UNITS: 100 INJECTION, SOLUTION SUBCUTANEOUS at 09:06

## 2017-06-28 RX ADMIN — FUROSEMIDE 60 MG: 10 INJECTION, SOLUTION INTRAMUSCULAR; INTRAVENOUS at 03:06

## 2017-06-28 RX ADMIN — INSULIN ASPART 1 UNITS: 100 INJECTION, SOLUTION INTRAVENOUS; SUBCUTANEOUS at 09:06

## 2017-06-28 NOTE — SUBJECTIVE & OBJECTIVE
"Past Medical History:   Diagnosis Date    Acute coronary syndrome     Bladder cancer     Bradycardia     CHF (congestive heart failure)     CKD (chronic kidney disease) stage 3, GFR 30-59 ml/min     Coronary artery disease     Diabetes mellitus     Heart attack     Hyperlipidemia     Hypertension     Macular degeneration     Pacemaker        Past Surgical History:   Procedure Laterality Date    BLADDER SURGERY      CARDIAC PACEMAKER PLACEMENT      CATARACT EXTRACTION      colon removal.          Review of patient's allergies indicates:  No Known Allergies    No current facility-administered medications on file prior to encounter.      Current Outpatient Prescriptions on File Prior to Encounter   Medication Sig    amiodarone (PACERONE) 200 MG Tab Take 1 tablet (200 mg total) by mouth 2 (two) times daily.    aspirin (ECOTRIN) 81 MG EC tablet Take 81 mg by mouth once daily.    atorvastatin (LIPITOR) 20 MG tablet Take 1 tablet (20 mg total) by mouth every evening.    BD INSULIN PEN NEEDLE UF MINI 31 gauge x 3/16" Ndle     bumetanide (BUMEX) 2 MG tablet Take 1 tablet (2 mg total) by mouth once daily.    ciprofloxacin HCl (CIPRO) 500 MG tablet Take 1 tablet (500 mg total) by mouth 2 (two) times daily.    cyanocobalamin 1,000 mcg/mL injection     fenofibrate 160 MG Tab Take 160 mg by mouth once daily.    glipiZIDE (GLUCOTROL) 2.5 MG TR24 Take 2.5 mg by mouth 2 (two) times daily with meals.     insulin glargine (LANTUS) 100 unit/mL injection Inject 15 Units into the skin 2 (two) times daily.     losartan (COZAAR) 25 MG tablet Take 0.5 tablets (12.5 mg total) by mouth once daily.    metoprolol succinate (TOPROL-XL) 25 MG 24 hr tablet Take 1 tablet (25 mg total) by mouth every evening.    nitroGLYCERIN (NITROSTAT) 0.4 MG SL tablet Place 1 tablet (0.4 mg total) under the tongue every 5 (five) minutes as needed for Chest pain.    oxycodone-acetaminophen (PERCOCET) 5-325 mg per tablet Take 1 tablet " by mouth every 6 (six) hours as needed.    potassium chloride SA (K-DUR,KLOR-CON) 10 MEQ tablet Take 10 mEq by mouth once.    tamsulosin (FLOMAX) 0.4 mg Cp24 Take 0.4 mg by mouth.     Family History     Problem Relation (Age of Onset)    No Known Problems Mother, Father, Sister, Brother, Maternal Aunt, Maternal Uncle, Paternal Aunt, Paternal Uncle, Maternal Grandmother, Maternal Grandfather, Paternal Grandmother, Paternal Grandfather        Social History Main Topics    Smoking status: Former Smoker    Smokeless tobacco: Not on file    Alcohol use No    Drug use: No    Sexual activity: Not on file     Review of Systems   Constitutional: Negative.  Negative for chills and fever.   HENT: Negative.  Negative for congestion and sore throat.    Eyes: Negative.  Negative for visual disturbance.   Respiratory: Positive for chest tightness and shortness of breath. Negative for cough and wheezing.    Cardiovascular: Positive for chest pain and leg swelling.   Gastrointestinal: Negative for abdominal pain, diarrhea, nausea and vomiting.   Endocrine: Negative.    Genitourinary: Positive for dysuria.   Musculoskeletal: Negative.  Negative for myalgias and neck stiffness.   Skin: Negative.  Negative for color change and pallor.   Allergic/Immunologic: Negative.    Neurological: Negative.    Hematological: Negative.    Psychiatric/Behavioral: Negative.    All other systems reviewed and are negative.    Objective:     Vital Signs (Most Recent):  Pulse: 78 (06/28/17 1551)  Resp: 20 (06/28/17 1551)  BP: 118/74 (06/28/17 1505)  SpO2: 100 % (06/28/17 1505) Vital Signs (24h Range):  Pulse:  [78-89] 78  Resp:  [16-30] 20  SpO2:  [77 %-100 %] 100 %  BP: (114-118)/(67-74) 118/74        There is no height or weight on file to calculate BMI.    Physical Exam   Constitutional: He is oriented to person, place, and time. He appears well-developed and well-nourished. No distress.   HENT:   Head: Normocephalic and atraumatic.    Mouth/Throat: Oropharynx is clear and moist.   Eyes: Conjunctivae and EOM are normal. Pupils are equal, round, and reactive to light.   Neck: No JVD present. No thyromegaly present.   Cardiovascular: Normal rate, regular rhythm and normal heart sounds.  Exam reveals no gallop and no friction rub.    No murmur heard.  Pulmonary/Chest: Effort normal and breath sounds normal. No respiratory distress. He has no wheezes. He has no rales.   Faint expiratory wheeze mid-lung bilaterally with mild crackles.   Abdominal: Soft. Bowel sounds are normal. He exhibits no distension. There is no tenderness. There is no rebound and no guarding.   Musculoskeletal: Normal range of motion. He exhibits edema. He exhibits no tenderness.   1+ pitting pre-tibial edema bilaterally   Lymphadenopathy:     He has no cervical adenopathy.   Neurological: He is alert and oriented to person, place, and time. He has normal reflexes. He displays normal reflexes. No cranial nerve deficit.   Skin: Skin is warm and dry. No rash noted. He is not diaphoretic. No erythema.   Psychiatric: He has a normal mood and affect. His behavior is normal. Judgment and thought content normal.        Significant Labs:   ABGs:   Recent Labs  Lab 06/28/17  1454   PH 7.357   PCO2 64.6*   HCO3 36.2*   POCSATURATED 89*   BE 11     CBC:   Recent Labs  Lab 06/28/17  1451   WBC 8.01   HGB 12.7*   HCT 41.2        CMP:   Recent Labs  Lab 06/28/17  1451      K 4.0   CL 92*   CO2 37*   *   BUN 34*   CREATININE 2.0*   CALCIUM 9.2   PROT 7.2   ALBUMIN 3.8   BILITOT 0.5   ALKPHOS 38*   AST 24   ALT 12   ANIONGAP 9   EGFRNONAA 27*       Significant Imaging: I have reviewed all pertinent imaging results/findings within the past 24 hours.

## 2017-06-28 NOTE — ED NOTES
Pt tolerating O2 by NC well. Pt states he is breathing better and has to use restroom. Will monitor output and notify MD of progress after ABG obtained.

## 2017-06-28 NOTE — ED PROVIDER NOTES
SCRIBE #1 NOTE: I, Marielena Warren, am scribing for, and in the presence of, Malorie Crews MD. I have scribed the entire note.        History      Chief Complaint   Patient presents with    Shortness of Breath     swelling to legs, shortness of breath, dysuria       Review of patient's allergies indicates:  No Known Allergies     HPI   HPI    6/28/2017, 2:56 PM   History obtained from the patient      History of Present Illness: Rajiv Russo is a 89 y.o. male patient who presents to the Emergency Department for SOB which onset gradually PTA. Symptoms are constant and moderate in severity. No mitigating or exacerbating factors reported. Associated sxs include BLE edema. Patient denies any BLE pain, recent travel/long car rides, CP, cough, fever, n/v, and all other sxs at this time. No further complaints or concerns at this time.       Arrival mode: Personal vehicle     PCP: Dean Soto Jr, MD       Past Medical History:  Past Medical History:   Diagnosis Date    Acute coronary syndrome     Bladder cancer     Bradycardia     CHF (congestive heart failure)     CKD (chronic kidney disease) stage 3, GFR 30-59 ml/min     Coronary artery disease     Diabetes mellitus     Heart attack     Hyperlipidemia     Hypertension     Macular degeneration     Pacemaker        Past Surgical History:  Past Surgical History:   Procedure Laterality Date    BLADDER SURGERY      CARDIAC PACEMAKER PLACEMENT      CATARACT EXTRACTION      colon removal.            Family History:  Family History   Problem Relation Age of Onset    No Known Problems Mother     No Known Problems Father     No Known Problems Sister     No Known Problems Brother     No Known Problems Maternal Aunt     No Known Problems Maternal Uncle     No Known Problems Paternal Aunt     No Known Problems Paternal Uncle     No Known Problems Maternal Grandmother     No Known Problems Maternal Grandfather     No Known Problems Paternal  Grandmother     No Known Problems Paternal Grandfather     Amblyopia Neg Hx     Blindness Neg Hx     Cancer Neg Hx     Cataracts Neg Hx     Diabetes Neg Hx     Glaucoma Neg Hx     Hypertension Neg Hx     Macular degeneration Neg Hx     Retinal detachment Neg Hx     Strabismus Neg Hx     Stroke Neg Hx     Thyroid disease Neg Hx        Social History:  Social History     Social History Main Topics    Smoking status: Former Smoker    Smokeless tobacco: unknown    Alcohol use No    Drug use: No    Sexual activity: uknown       ROS   Review of Systems   Constitutional: Negative for fever.   HENT: Negative for sore throat.    Respiratory: Positive for shortness of breath. Negative for cough.    Cardiovascular: Positive for leg swelling (BLE). Negative for chest pain.   Gastrointestinal: Negative for nausea and vomiting.   Genitourinary: Negative for dysuria.   Musculoskeletal: Negative for back pain.        (-) BLE pain   Skin: Negative for rash.   Neurological: Negative for weakness.   Hematological: Does not bruise/bleed easily.   All other systems reviewed and are negative.      Physical Exam      Initial Vitals [06/28/17 1440]   BP Pulse Resp Temp SpO2   114/67 89 (!) 30 -- (!) 77 %      MAP       82.67          Physical Exam  Nursing Notes and Vital Signs Reviewed.  Constitutional: Patient is in no acute distress. Well-developed and well-nourished.  Head: Atraumatic. Normocephalic.  Eyes: PERRL. EOM intact. Conjunctivae are not pale. No scleral icterus.  ENT: Mucous membranes are moist. Oropharynx is clear and symmetric.    Neck: Supple. Full ROM. No lymphadenopathy.  Cardiovascular: Regular rate. Regular rhythm. No murmurs, rubs, or gallops. Distal pulses are 2+ and symmetric.  Pulmonary/Chest: No respiratory distress. Clear to auscultation bilaterally. No wheezing, rales, or rhonchi.  Abdominal: Soft and non-distended.  There is no tenderness.  No rebound, guarding, or rigidity.  Musculoskeletal:  Moves all extremities. No obvious deformities. BLE edema. No calf tenderness.  Skin: Warm and dry.  Neurological:  Alert, awake, and appropriate.  Normal speech.  No acute focal neurological deficits are appreciated.  Psychiatric: Normal affect. Good eye contact. Appropriate in content.    ED Course    Procedures  ED Vital Signs:  Vitals:    06/28/17 1440 06/28/17 1459 06/28/17 1505 06/28/17 1535   BP: 114/67 115/70 118/74    Pulse: 89 83 82 80   Resp: (!) 30 20 (!) 22 16   SpO2: (!) 77% 95% 100%     06/28/17 1540 06/28/17 1551   BP:     Pulse: 80 78   Resp: 16 20   SpO2:         Abnormal Lab Results:  Labs Reviewed   CBC W/ AUTO DIFFERENTIAL - Abnormal; Notable for the following:        Result Value    RBC 4.54 (*)     Hemoglobin 12.7 (*)     MCHC 30.8 (*)     RDW 15.5 (*)     Gran% 73.1 (*)     Lymph% 13.4 (*)     All other components within normal limits   COMPREHENSIVE METABOLIC PANEL - Abnormal; Notable for the following:     Chloride 92 (*)     CO2 37 (*)     Glucose 223 (*)     BUN, Bld 34 (*)     Creatinine 2.0 (*)     Alkaline Phosphatase 38 (*)     eGFR if  31 (*)     eGFR if non  27 (*)     All other components within normal limits   TROPONIN I - Abnormal; Notable for the following:     Troponin I 0.042 (*)     All other components within normal limits   B-TYPE NATRIURETIC PEPTIDE - Abnormal; Notable for the following:     BNP 1,471 (*)     All other components within normal limits   ISTAT PROCEDURE - Abnormal; Notable for the following:     POC PCO2 64.6 (*)     POC PO2 62 (*)     POC HCO3 36.2 (*)     POC SATURATED O2 89 (*)     All other components within normal limits   MAGNESIUM   CK-MB   CK   PROTIME-INR   APTT   URINALYSIS   URINALYSIS MICROSCOPIC        All Lab Results:  Results for orders placed or performed during the hospital encounter of 06/28/17   CBC auto differential   Result Value Ref Range    WBC 8.01 3.90 - 12.70 K/uL    RBC 4.54 (L) 4.60 - 6.20 M/uL     Hemoglobin 12.7 (L) 14.0 - 18.0 g/dL    Hematocrit 41.2 40.0 - 54.0 %    MCV 91 82 - 98 fL    MCH 28.0 27.0 - 31.0 pg    MCHC 30.8 (L) 32.0 - 36.0 %    RDW 15.5 (H) 11.5 - 14.5 %    Platelets 190 150 - 350 K/uL    MPV 9.8 9.2 - 12.9 fL    Gran # 5.9 1.8 - 7.7 K/uL    Lymph # 1.1 1.0 - 4.8 K/uL    Mono # 0.9 0.3 - 1.0 K/uL    Eos # 0.2 0.0 - 0.5 K/uL    Baso # 0.01 0.00 - 0.20 K/uL    Gran% 73.1 (H) 38.0 - 73.0 %    Lymph% 13.4 (L) 18.0 - 48.0 %    Mono% 11.2 4.0 - 15.0 %    Eosinophil% 2.2 0.0 - 8.0 %    Basophil% 0.1 0.0 - 1.9 %    Differential Method Automated    Comprehensive metabolic panel   Result Value Ref Range    Sodium 138 136 - 145 mmol/L    Potassium 4.0 3.5 - 5.1 mmol/L    Chloride 92 (L) 95 - 110 mmol/L    CO2 37 (H) 23 - 29 mmol/L    Glucose 223 (H) 70 - 110 mg/dL    BUN, Bld 34 (H) 10 - 30 mg/dL    Creatinine 2.0 (H) 0.5 - 1.4 mg/dL    Calcium 9.2 8.7 - 10.5 mg/dL    Total Protein 7.2 6.0 - 8.4 g/dL    Albumin 3.8 3.5 - 5.2 g/dL    Total Bilirubin 0.5 0.1 - 1.0 mg/dL    Alkaline Phosphatase 38 (L) 55 - 135 U/L    AST 24 10 - 40 U/L    ALT 12 10 - 44 U/L    Anion Gap 9 8 - 16 mmol/L    eGFR if African American 31 (A) >60 mL/min/1.73 m^2    eGFR if non African American 27 (A) >60 mL/min/1.73 m^2   Magnesium   Result Value Ref Range    Magnesium 2.3 1.6 - 2.6 mg/dL   CK-MB   Result Value Ref Range     20 - 200 U/L    CPK MB 4.4 0.1 - 6.5 ng/mL    MB% 2.7 0.0 - 5.0 %   CK   Result Value Ref Range     20 - 200 U/L   Troponin I   Result Value Ref Range    Troponin I 0.042 (H) 0.000 - 0.026 ng/mL   Protime-INR   Result Value Ref Range    Prothrombin Time 12.0 9.0 - 12.5 sec    INR 1.2 0.8 - 1.2   APTT   Result Value Ref Range    aPTT 26.6 21.0 - 32.0 sec   Brain natriuretic peptide   Result Value Ref Range    BNP 1,471 (H) 0 - 99 pg/mL   ISTAT PROCEDURE   Result Value Ref Range    POC PH 7.357 7.35 - 7.45    POC PCO2 64.6 (HH) 35 - 45 mmHg    POC PO2 62 (L) 80 - 100 mmHg    POC HCO3 36.2 (H)  24 - 28 mmol/L    POC BE 11 -2 to 2 mmol/L    POC SATURATED O2 89 (L) 95 - 100 %    Sample ARTERIAL     Site LR     DelSys Nasal Can     Mode SPONT     Flow 2     FiO2 28          Imaging Results:  Imaging Results          X-Ray Chest 1 View (Final result)  Result time 06/28/17 15:22:43    Final result by Keenan Cole MD (06/28/17 15:22:43)                 Impression:     Congestive heart failure with cardiomegaly, interstitial pulmonary edema, and small bilateral pleural effusions.            Electronically signed by: KEENAN COLE MD  Date:     06/28/17  Time:    15:22              Narrative:    Exam: Portable chest radiograph    Clinical History:   sob.     Comparison: Chest x-ray, 03/21/2017    Findings:.     There is interstitial pulmonary edema and small bilateral pleural effusions. The heart size is enlarged. No pneumothorax.  There is a left-sided dual-lead AICD.                             The EKG was ordered, reviewed, and independently interpreted by the ED provider.  Interpretation time: 14:45  Rate: 89 BPM   Rhythm: Atrial-sensed ventricular-paced rhythm.  Interpretation: No acute ST changes. No STEMI.           The Emergency Provider reviewed the vital signs and test results, which are outlined above.    ED Discussion     3:50 PM: Re-evaluated pt. I have discussed test results, shared treatment plan, and the need for admission with patient and family at bedside. Pt and family express understanding at this time and agree with all information. All questions answered. Pt and family have no further questions or concerns at this time. Pt is ready for admit.    3:56 PM: Discussed case with Migdalia Buckner NP (Hospital Medicine). Dr. Acevedo agrees with current care and management of pt and accepts admission.   Admitting Service: Hospital medicine   Admitting Physician: Dr. Acevedo  Admit to: ICU        ED Medication(s):  Medications   furosemide injection 60 mg (60 mg Intravenous Given 6/28/17 2632)    albuterol-ipratropium 2.5mg-0.5mg/3mL nebulizer solution 3 mL (3 mLs Nebulization Given 6/28/17 4667)       New Prescriptions    No medications on file             Medical Decision Making    Medical Decision Making:   Clinical Tests:   Lab Tests: Ordered and Reviewed  Radiological Study: Ordered and Reviewed  Medical Tests: Ordered and Reviewed           Scribe Attestation:   Scribe #1: I performed the above scribed service and the documentation accurately describes the services I performed. I attest to the accuracy of the note.    Attending:   Physician Attestation Statement for Scribe #1: I, Malorie Crews MD, personally performed the services described in this documentation, as scribed by Marielena Warren, in my presence, and it is both accurate and complete.          Clinical Impression       ICD-10-CM ICD-9-CM   1. Acute respiratory failure with hypoxia and hypercapnia J96.01 518.81    J96.02    2. Acute systolic congestive heart failure I50.21 428.21     428.0       Disposition:   Disposition: Admitted  Condition: Critical         Malorie Crews MD  06/28/17 1619       Malorie Crews MD  06/28/17 1621

## 2017-06-28 NOTE — HPI
""Fluid on lungs" and more swollen.  Difficulty breathing at night and "smothering" got him to come to the ER.  Dry cough.  Normally weighs about 208 pounds.  Took an additional diuretic last night but still has not increased urine output.  His wife  suddenly on Monday and one of their daughters  .  The  was today and they were  for 72 years.  He has been drinking a lot of coffee the last couple of days.  He also developed some pain with urination and his urine smells abnormal.  When he feels the smothering he also has generalized chest pain that goes away when his breathing improves.  Follows outpatient with Dr. Sanchez of Cardiology.  "

## 2017-06-29 LAB
ALBUMIN SERPL BCP-MCNC: 3.3 G/DL
ANION GAP SERPL CALC-SCNC: 6 MMOL/L
BASOPHILS # BLD AUTO: 0.01 K/UL
BASOPHILS NFR BLD: 0.1 %
BUN SERPL-MCNC: 31 MG/DL
CALCIUM SERPL-MCNC: 8.7 MG/DL
CHLORIDE SERPL-SCNC: 94 MMOL/L
CO2 SERPL-SCNC: 40 MMOL/L
CREAT SERPL-MCNC: 1.7 MG/DL
DIFFERENTIAL METHOD: ABNORMAL
EOSINOPHIL # BLD AUTO: 0.2 K/UL
EOSINOPHIL NFR BLD: 2.8 %
ERYTHROCYTE [DISTWIDTH] IN BLOOD BY AUTOMATED COUNT: 15.6 %
EST. GFR  (AFRICAN AMERICAN): 38 ML/MIN/1.73 M^2
EST. GFR  (NON AFRICAN AMERICAN): 33 ML/MIN/1.73 M^2
GLUCOSE SERPL-MCNC: 191 MG/DL
HCT VFR BLD AUTO: 39.4 %
HGB BLD-MCNC: 12 G/DL
LYMPHOCYTES # BLD AUTO: 0.8 K/UL
LYMPHOCYTES NFR BLD: 11.8 %
MCH RBC QN AUTO: 27.8 PG
MCHC RBC AUTO-ENTMCNC: 30.5 %
MCV RBC AUTO: 91 FL
MONOCYTES # BLD AUTO: 0.8 K/UL
MONOCYTES NFR BLD: 10.7 %
NEUTROPHILS # BLD AUTO: 5.3 K/UL
NEUTROPHILS NFR BLD: 74.6 %
PHOSPHATE SERPL-MCNC: 3.4 MG/DL
PLATELET # BLD AUTO: 186 K/UL
PMV BLD AUTO: 10.2 FL
POCT GLUCOSE: 155 MG/DL (ref 70–110)
POCT GLUCOSE: 203 MG/DL (ref 70–110)
POCT GLUCOSE: 91 MG/DL (ref 70–110)
POTASSIUM SERPL-SCNC: 3.8 MMOL/L
RBC # BLD AUTO: 4.32 M/UL
SODIUM SERPL-SCNC: 140 MMOL/L
WBC # BLD AUTO: 7.04 K/UL

## 2017-06-29 PROCEDURE — 80069 RENAL FUNCTION PANEL: CPT

## 2017-06-29 PROCEDURE — 25000003 PHARM REV CODE 250: Performed by: INTERNAL MEDICINE

## 2017-06-29 PROCEDURE — 20000000 HC ICU ROOM

## 2017-06-29 PROCEDURE — 85025 COMPLETE CBC W/AUTO DIFF WBC: CPT

## 2017-06-29 PROCEDURE — 99900035 HC TECH TIME PER 15 MIN (STAT)

## 2017-06-29 PROCEDURE — 27000221 HC OXYGEN, UP TO 24 HOURS

## 2017-06-29 PROCEDURE — 94640 AIRWAY INHALATION TREATMENT: CPT

## 2017-06-29 PROCEDURE — 25000242 PHARM REV CODE 250 ALT 637 W/ HCPCS: Performed by: NURSE PRACTITIONER

## 2017-06-29 PROCEDURE — 25000003 PHARM REV CODE 250: Performed by: NURSE PRACTITIONER

## 2017-06-29 PROCEDURE — 99291 CRITICAL CARE FIRST HOUR: CPT | Mod: ,,, | Performed by: INTERNAL MEDICINE

## 2017-06-29 PROCEDURE — 63600175 PHARM REV CODE 636 W HCPCS: Performed by: INTERNAL MEDICINE

## 2017-06-29 PROCEDURE — 36415 COLL VENOUS BLD VENIPUNCTURE: CPT

## 2017-06-29 PROCEDURE — 63600175 PHARM REV CODE 636 W HCPCS: Performed by: SPECIALIST

## 2017-06-29 PROCEDURE — 94660 CPAP INITIATION&MGMT: CPT

## 2017-06-29 RX ORDER — DIPHENHYDRAMINE HCL 25 MG
25 CAPSULE ORAL NIGHTLY PRN
Status: DISCONTINUED | OUTPATIENT
Start: 2017-06-29 | End: 2017-06-30 | Stop reason: HOSPADM

## 2017-06-29 RX ORDER — DOCUSATE SODIUM 100 MG/1
100 CAPSULE, LIQUID FILLED ORAL DAILY
Status: DISCONTINUED | OUTPATIENT
Start: 2017-06-29 | End: 2017-06-30 | Stop reason: HOSPADM

## 2017-06-29 RX ORDER — BISACODYL 10 MG
10 SUPPOSITORY, RECTAL RECTAL DAILY PRN
Status: DISCONTINUED | OUTPATIENT
Start: 2017-06-29 | End: 2017-06-30 | Stop reason: HOSPADM

## 2017-06-29 RX ORDER — IPRATROPIUM BROMIDE AND ALBUTEROL SULFATE 2.5; .5 MG/3ML; MG/3ML
3 SOLUTION RESPIRATORY (INHALATION) EVERY 6 HOURS
Status: DISCONTINUED | OUTPATIENT
Start: 2017-06-29 | End: 2017-06-29

## 2017-06-29 RX ORDER — ONDANSETRON 2 MG/ML
4 INJECTION INTRAMUSCULAR; INTRAVENOUS EVERY 8 HOURS PRN
Status: DISCONTINUED | OUTPATIENT
Start: 2017-06-29 | End: 2017-06-30 | Stop reason: HOSPADM

## 2017-06-29 RX ORDER — FAMOTIDINE 20 MG/1
20 TABLET, FILM COATED ORAL DAILY
Status: DISCONTINUED | OUTPATIENT
Start: 2017-06-29 | End: 2017-06-30 | Stop reason: HOSPADM

## 2017-06-29 RX ORDER — IPRATROPIUM BROMIDE AND ALBUTEROL SULFATE 2.5; .5 MG/3ML; MG/3ML
3 SOLUTION RESPIRATORY (INHALATION)
Status: DISCONTINUED | OUTPATIENT
Start: 2017-06-29 | End: 2017-06-30 | Stop reason: HOSPADM

## 2017-06-29 RX ADMIN — INSULIN ASPART 2 UNITS: 100 INJECTION, SOLUTION INTRAVENOUS; SUBCUTANEOUS at 04:06

## 2017-06-29 RX ADMIN — TAMSULOSIN HYDROCHLORIDE 0.4 MG: 0.4 CAPSULE ORAL at 09:06

## 2017-06-29 RX ADMIN — IPRATROPIUM BROMIDE AND ALBUTEROL SULFATE 3 ML: .5; 3 SOLUTION RESPIRATORY (INHALATION) at 01:06

## 2017-06-29 RX ADMIN — FUROSEMIDE 40 MG: 10 INJECTION, SOLUTION INTRAMUSCULAR; INTRAVENOUS at 09:06

## 2017-06-29 RX ADMIN — ASPIRIN 81 MG: 81 TABLET, COATED ORAL at 09:06

## 2017-06-29 RX ADMIN — AMIODARONE HYDROCHLORIDE 200 MG: 200 TABLET ORAL at 09:06

## 2017-06-29 RX ADMIN — DIPHENHYDRAMINE HYDROCHLORIDE 25 MG: 25 CAPSULE ORAL at 09:06

## 2017-06-29 RX ADMIN — IPRATROPIUM BROMIDE AND ALBUTEROL SULFATE 3 ML: .5; 3 SOLUTION RESPIRATORY (INHALATION) at 07:06

## 2017-06-29 RX ADMIN — INSULIN DETEMIR 10 UNITS: 100 INJECTION, SOLUTION SUBCUTANEOUS at 09:06

## 2017-06-29 RX ADMIN — HEPARIN SODIUM 5000 UNITS: 5000 INJECTION, SOLUTION INTRAVENOUS; SUBCUTANEOUS at 09:06

## 2017-06-29 RX ADMIN — ATORVASTATIN CALCIUM 20 MG: 10 TABLET, FILM COATED ORAL at 09:06

## 2017-06-29 RX ADMIN — INSULIN ASPART 1 UNITS: 100 INJECTION, SOLUTION INTRAVENOUS; SUBCUTANEOUS at 09:06

## 2017-06-29 RX ADMIN — HEPARIN SODIUM 5000 UNITS: 5000 INJECTION, SOLUTION INTRAVENOUS; SUBCUTANEOUS at 05:06

## 2017-06-29 RX ADMIN — FAMOTIDINE 20 MG: 20 TABLET ORAL at 11:06

## 2017-06-29 RX ADMIN — DOCUSATE SODIUM 100 MG: 100 CAPSULE, LIQUID FILLED ORAL at 11:06

## 2017-06-29 RX ADMIN — INSULIN ASPART 4 UNITS: 100 INJECTION, SOLUTION INTRAVENOUS; SUBCUTANEOUS at 11:06

## 2017-06-29 RX ADMIN — HEPARIN SODIUM 5000 UNITS: 5000 INJECTION, SOLUTION INTRAVENOUS; SUBCUTANEOUS at 02:06

## 2017-06-29 NOTE — H&P
"[facility] Hospital Medicine  History & Physical    Patient Name: Rajiv Russo  MRN: [id number]  Admission Date: 2017  Attending Physician: Vladimir Acevedo MD   Primary Care Provider: Dean Soto Jr, MD         Patient information was obtained from patient, relative(s), past medical records and ER records.     Subjective:     Principal Problem:Acute respiratory failure with hypoxia and hypercapnia    Chief Complaint:   Chief Complaint   Patient presents with    Shortness of Breath     swelling to legs, shortness of breath, dysuria        HPI: "Fluid on lungs" and more swollen.  Difficulty breathing at night and "smothering" got him to come to the ER.  Dry cough.  Normally weighs about 208 pounds.  Took an additional diuretic last night but still has not increased urine output.  His wife  suddenly on Monday and one of their daughters  .  The  was today and they were  for 72 years.  He has been drinking a lot of coffee the last couple of days.  He also developed some pain with urination and his urine smells abnormal.  When he feels the smothering he also has generalized chest pain that goes away when his breathing improves.  Follows outpatient with Dr. Sanchez of Cardiology.    Past Medical History:   Diagnosis Date    Acute coronary syndrome     Bladder cancer     Bradycardia     CHF (congestive heart failure)     CKD (chronic kidney disease) stage 3, GFR 30-59 ml/min     Coronary artery disease     Diabetes mellitus     Heart attack     Hyperlipidemia     Hypertension     Macular degeneration     Pacemaker        Past Surgical History:   Procedure Laterality Date    BLADDER SURGERY      CARDIAC PACEMAKER PLACEMENT      CATARACT EXTRACTION      colon removal.          Review of patient's allergies indicates:  No Known Allergies    No current facility-administered medications on file prior to encounter.      Current Outpatient Prescriptions " "on File Prior to Encounter   Medication Sig    amiodarone (PACERONE) 200 MG Tab Take 1 tablet (200 mg total) by mouth 2 (two) times daily.    aspirin (ECOTRIN) 81 MG EC tablet Take 81 mg by mouth once daily.    atorvastatin (LIPITOR) 20 MG tablet Take 1 tablet (20 mg total) by mouth every evening.    BD INSULIN PEN NEEDLE UF MINI 31 gauge x 3/16" Ndle     bumetanide (BUMEX) 2 MG tablet Take 1 tablet (2 mg total) by mouth once daily.    ciprofloxacin HCl (CIPRO) 500 MG tablet Take 1 tablet (500 mg total) by mouth 2 (two) times daily.    cyanocobalamin 1,000 mcg/mL injection     fenofibrate 160 MG Tab Take 160 mg by mouth once daily.    glipiZIDE (GLUCOTROL) 2.5 MG TR24 Take 2.5 mg by mouth 2 (two) times daily with meals.     insulin glargine (LANTUS) 100 unit/mL injection Inject 15 Units into the skin 2 (two) times daily.     losartan (COZAAR) 25 MG tablet Take 0.5 tablets (12.5 mg total) by mouth once daily.    metoprolol succinate (TOPROL-XL) 25 MG 24 hr tablet Take 1 tablet (25 mg total) by mouth every evening.    nitroGLYCERIN (NITROSTAT) 0.4 MG SL tablet Place 1 tablet (0.4 mg total) under the tongue every 5 (five) minutes as needed for Chest pain.    oxycodone-acetaminophen (PERCOCET) 5-325 mg per tablet Take 1 tablet by mouth every 6 (six) hours as needed.    potassium chloride SA (K-DUR,KLOR-CON) 10 MEQ tablet Take 10 mEq by mouth once.    tamsulosin (FLOMAX) 0.4 mg Cp24 Take 0.4 mg by mouth.     Family History     Problem Relation (Age of Onset)    No Known Problems Mother, Father, Sister, Brother, Maternal Aunt, Maternal Uncle, Paternal Aunt, Paternal Uncle, Maternal Grandmother, Maternal Grandfather, Paternal Grandmother, Paternal Grandfather        Social History Main Topics    Smoking status: Former Smoker    Smokeless tobacco: Not on file    Alcohol use No    Drug use: No    Sexual activity: Not on file     Review of Systems   Constitutional: Negative.  Negative for chills and " fever.   HENT: Negative.  Negative for congestion and sore throat.    Eyes: Negative.  Negative for visual disturbance.   Respiratory: Positive for chest tightness and shortness of breath. Negative for cough and wheezing.    Cardiovascular: Positive for chest pain and leg swelling.   Gastrointestinal: Negative for abdominal pain, diarrhea, nausea and vomiting.   Endocrine: Negative.    Genitourinary: Positive for dysuria.   Musculoskeletal: Negative.  Negative for myalgias and neck stiffness.   Skin: Negative.  Negative for color change and pallor.   Allergic/Immunologic: Negative.    Neurological: Negative.    Hematological: Negative.    Psychiatric/Behavioral: Negative.    All other systems reviewed and are negative.    Objective:     Vital Signs (Most Recent):  Pulse: 78 (06/28/17 1551)  Resp: 20 (06/28/17 1551)  BP: 118/74 (06/28/17 1505)  SpO2: 100 % (06/28/17 1505) Vital Signs (24h Range):  Pulse:  [78-89] 78  Resp:  [16-30] 20  SpO2:  [77 %-100 %] 100 %  BP: (114-118)/(67-74) 118/74        There is no height or weight on file to calculate BMI.    Physical Exam   Constitutional: He is oriented to person, place, and time. He appears well-developed and well-nourished. No distress.   HENT:   Head: Normocephalic and atraumatic.   Mouth/Throat: Oropharynx is clear and moist.   Eyes: Conjunctivae and EOM are normal. Pupils are equal, round, and reactive to light.   Neck: No JVD present. No thyromegaly present.   Cardiovascular: Normal rate, regular rhythm and normal heart sounds.  Exam reveals no gallop and no friction rub.    No murmur heard.  Pulmonary/Chest: Effort normal and breath sounds normal. No respiratory distress. He has no wheezes. He has no rales.   Faint expiratory wheeze mid-lung bilaterally with mild crackles.   Abdominal: Soft. Bowel sounds are normal. He exhibits no distension. There is no tenderness. There is no rebound and no guarding.   Musculoskeletal: Normal range of motion. He exhibits edema.  He exhibits no tenderness.   1+ pitting pre-tibial edema bilaterally   Lymphadenopathy:     He has no cervical adenopathy.   Neurological: He is alert and oriented to person, place, and time. He has normal reflexes. He displays normal reflexes. No cranial nerve deficit.   Skin: Skin is warm and dry. No rash noted. He is not diaphoretic. No erythema.   Psychiatric: He has a normal mood and affect. His behavior is normal. Judgment and thought content normal.        Significant Labs:   ABGs:   Recent Labs  Lab 06/28/17  1454   PH 7.357   PCO2 64.6*   HCO3 36.2*   POCSATURATED 89*   BE 11     CBC:   Recent Labs  Lab 06/28/17  1451   WBC 8.01   HGB 12.7*   HCT 41.2        CMP:   Recent Labs  Lab 06/28/17  1451      K 4.0   CL 92*   CO2 37*   *   BUN 34*   CREATININE 2.0*   CALCIUM 9.2   PROT 7.2   ALBUMIN 3.8   BILITOT 0.5   ALKPHOS 38*   AST 24   ALT 12   ANIONGAP 9   EGFRNONAA 27*       Significant Imaging: I have reviewed all pertinent imaging results/findings within the past 24 hours.    Assessment/Plan:     Chronic systolic CHF (congestive heart failure)    Careful fluid management and diuresis.  Continue home medications.  Exacerbation by recent stress of deaths in the family.        CKD (chronic kidney disease) stage 3, GFR 30-59 ml/min    Careful fluid management and avoid renal toxic medications.  Initial renal function is near baseline.  Follow daily.        HTN (hypertension)    Continue home medications.        DM type 2, uncontrolled, with renal complications    Accuchecks and low dose SSI.        * Acute respiratory failure with hypoxia and hypercapnia    Secondary to heart failure exacerbation.  Infiltrates bilaterally on chest x-ray consistent with pulmonary edema and elevated BNP.  Continue diuresis.  He is 11 pounds over baseline weight.  BiPAP to maintain oxygen saturation and reduce CO2.          VTE Risk Mitigation         Ordered     heparin (porcine) injection 5,000 Units  Every  8 hours     Route:  Subcutaneous        06/28/17 2131     Medium Risk of VTE  Once      06/28/17 2131     Place sequential compression device  Until discontinued      06/28/17 2131        Vladimir Acevedo MD  Department of Hospital Medicine   [facility]    Critical care time:  30 minutes

## 2017-06-29 NOTE — ASSESSMENT & PLAN NOTE
Careful fluid management and diuresis.  Continue home medications.  Exacerbation by recent stress of deaths in the family.

## 2017-06-29 NOTE — PLAN OF CARE
Problem: Patient Care Overview  Goal: Plan of Care Review  Outcome: Ongoing (interventions implemented as appropriate)  Pt without any issues throughout the day; remains Ox4; VSS; on 4LO2/NC, which he wears at home; BMx2; voids without difficulty adequate amount; family has remained at bedside; NADN.

## 2017-06-29 NOTE — ASSESSMENT & PLAN NOTE
Secondary to heart failure exacerbation.  Infiltrates bilaterally on chest x-ray consistent with pulmonary edema and elevated BNP.  Continue diuresis.  He is 11 pounds over baseline weight.  Weaned from BiPAP.  Used nasal cannula at home as needed and usually only at night.  Continue diuresis.  Transfer to floor.

## 2017-06-29 NOTE — PLAN OF CARE
Problem: Patient Care Overview  Goal: Plan of Care Review  Outcome: Ongoing (interventions implemented as appropriate)  POC and interventions discussed with daughter and patient - VU.  Diuresed.  Tolerating BiPap well.  Tolerated being off BiPap for meal - No dyspnea, no labored breathing, no SOB.  Denies any pain.

## 2017-06-29 NOTE — ASSESSMENT & PLAN NOTE
Careful fluid management and avoid renal toxic medications.  Initial renal function is near baseline.  Follow daily.

## 2017-06-29 NOTE — PROGRESS NOTES
"[facility] Hospital Medicine  Progress Note    Patient Name: Rajiv Russo  MRN: [id number]  Patient Class: IP- Inpatient   Admission Date: 2017  Length of Stay: 1 days  Attending Physician: Vladimir Acevedo MD  Primary Care Provider: Dean Soto Jr, MD        Subjective:     Principal Problem:Acute respiratory failure with hypoxia and hypercapnia    HPI:  "Fluid on lungs" and more swollen.  Difficulty breathing at night and "smothering" got him to come to the ER.  Dry cough.  Normally weighs about 208 pounds.  Took an additional diuretic last night but still has not increased urine output.  His wife  suddenly on Monday and one of their daughters  .  The  was today and they were  for 72 years.  He has been drinking a lot of coffee the last couple of days.  He also developed some pain with urination and his urine smells abnormal.  When he feels the smothering he also has generalized chest pain that goes away when his breathing improves.  Follows outpatient with Dr. Sanchez of Cardiology.    Hospital Course:  No notes on file    Interval History:  Weaned from BiPAP overnight.  Comfortable on nasal cannula.  Mild dyspnea on exertion.    Review of Systems   Constitutional: Negative.  Negative for chills and fever.   HENT: Negative.  Negative for congestion and sore throat.    Eyes: Negative.  Negative for visual disturbance.   Respiratory: Positive for shortness of breath. Negative for cough and wheezing.    Cardiovascular: Negative.  Negative for chest pain.   Gastrointestinal: Negative for abdominal pain, diarrhea, nausea and vomiting.   Endocrine: Negative.    Genitourinary: Negative.    Musculoskeletal: Negative.  Negative for myalgias and neck stiffness.   Skin: Negative.  Negative for color change and pallor.   Allergic/Immunologic: Negative.    Neurological: Negative.    Hematological: Negative.    Psychiatric/Behavioral: Negative.    All other systems " reviewed and are negative.    Objective:     Vital Signs (Most Recent):  Temp: 97.2 °F (36.2 °C) (06/29/17 0500)  Pulse: 89 (06/29/17 0828)  Resp: 16 (06/29/17 0828)  BP: 124/67 (06/29/17 0600)  SpO2: (!) 94 % (06/29/17 0828) Vital Signs (24h Range):  Temp:  [97.2 °F (36.2 °C)-98 °F (36.7 °C)] 97.2 °F (36.2 °C)  Pulse:  [70-89] 89  Resp:  [16-30] 16  SpO2:  [77 %-100 %] 94 %  BP: (103-145)/(49-94) 124/67     Weight: 98.8 kg (217 lb 13 oz)  Body mass index is 30.38 kg/m².    Intake/Output Summary (Last 24 hours) at 06/29/17 1031  Last data filed at 06/29/17 0630   Gross per 24 hour   Intake              180 ml   Output             1126 ml   Net             -946 ml      Physical Exam   Constitutional: He is oriented to person, place, and time. He appears well-developed and well-nourished. No distress.   HENT:   Head: Normocephalic and atraumatic.   Mouth/Throat: Oropharynx is clear and moist.   Eyes: Conjunctivae and EOM are normal. Pupils are equal, round, and reactive to light.   Neck: No JVD present. No thyromegaly present.   Cardiovascular: Normal rate, regular rhythm and normal heart sounds.  Exam reveals no gallop and no friction rub.    No murmur heard.  Pulmonary/Chest: Effort normal and breath sounds normal. No respiratory distress. He has no wheezes. He has no rales.   Abdominal: Soft. Bowel sounds are normal. He exhibits no distension. There is no tenderness. There is no rebound and no guarding.   Musculoskeletal: Normal range of motion. He exhibits no edema or tenderness.   Lymphadenopathy:     He has no cervical adenopathy.   Neurological: He is alert and oriented to person, place, and time. He has normal reflexes. He displays normal reflexes. No cranial nerve deficit.   Skin: Skin is warm and dry. No rash noted. He is not diaphoretic. No erythema.   Psychiatric: He has a normal mood and affect. His behavior is normal. Judgment and thought content normal.       Significant Labs:   CBC:   Recent Labs  Lab  06/28/17  1451   WBC 8.01   HGB 12.7*   HCT 41.2        CMP:   Recent Labs  Lab 06/28/17  1451      K 4.0   CL 92*   CO2 37*   *   BUN 34*   CREATININE 2.0*   CALCIUM 9.2   PROT 7.2   ALBUMIN 3.8   BILITOT 0.5   ALKPHOS 38*   AST 24   ALT 12   ANIONGAP 9   EGFRNONAA 27*       Significant Imaging: I have reviewed all pertinent imaging results/findings within the past 24 hours.    Assessment/Plan:      Chronic systolic CHF (congestive heart failure)    Careful fluid management and diuresis.  Continue home medications.  Exacerbation by recent stress of deaths in the family.        CKD (chronic kidney disease) stage 3, GFR 30-59 ml/min    Careful fluid management and avoid renal toxic medications.  Initial renal function is near baseline.  Recheck renal function this afternoon.        HTN (hypertension)    Continue home medications.        DM type 2, uncontrolled, with renal complications    Accuchecks and low dose SSI.        * Acute respiratory failure with hypoxia and hypercapnia    Secondary to heart failure exacerbation.  Infiltrates bilaterally on chest x-ray consistent with pulmonary edema and elevated BNP.  Continue diuresis.  He is 11 pounds over baseline weight.  Weaned from BiPAP.  Used nasal cannula at home as needed and usually only at night.  Continue diuresis.  Transfer to floor.          VTE Risk Mitigation         Ordered     heparin (porcine) injection 5,000 Units  Every 8 hours     Route:  Subcutaneous        06/28/17 2131     Medium Risk of VTE  Once      06/28/17 2131     Place sequential compression device  Until discontinued      06/28/17 2131          Vladimir Acevedo MD  Department of Hospital Medicine   [facility]

## 2017-06-29 NOTE — ASSESSMENT & PLAN NOTE
Careful fluid management and avoid renal toxic medications.  Initial renal function is near baseline.  Recheck renal function this afternoon.

## 2017-06-29 NOTE — ASSESSMENT & PLAN NOTE
Secondary to heart failure exacerbation.  Infiltrates bilaterally on chest x-ray consistent with pulmonary edema and elevated BNP.  Continue diuresis.  He is 11 pounds over baseline weight.  BiPAP to maintain oxygen saturation and reduce CO2.

## 2017-06-29 NOTE — CONSULTS
[facility]   Critical Care Medicine  Consult Note    Patient Name: Rajiv Russo  MRN: [id number]  Admission Date: 6/28/2017  Hospital Length of Stay: 1 days  Code Status: Full Code  Attending Physician: Anita Acevedo MD   Primary Care Provider: Dean Soto Jr, MD   Principal Problem: Acute respiratory failure with hypoxia and hypercapnia    Inpatient consult to Pulmonology  Consult performed by: JEFF ZAMORA  Consult ordered by: ANITA ACEVEDO        Subjective: shortness of breath and edema     HPI: 89 y/o with 2 day history of paroxysmal nocturnal dyspnea,SOB and peripheral edema. Irregular dosing of diuretics due to stress from recent death of wife . Hx of  Coronary Artery Disease and systolic Congestive Heart failure. No chest pain.    Hospital/ICU Course: Improved dyspnea with diuresis./ No chest pain     Past Medical History:   Diagnosis Date    Acute coronary syndrome     Bladder cancer     Bradycardia     CHF (congestive heart failure)     CKD (chronic kidney disease) stage 3, GFR 30-59 ml/min     Coronary artery disease     Diabetes mellitus     Heart attack     Hyperlipidemia     Hypertension     Macular degeneration     Pacemaker        Past Surgical History:   Procedure Laterality Date    BLADDER SURGERY      CARDIAC PACEMAKER PLACEMENT      CATARACT EXTRACTION      colon removal.          Review of patient's allergies indicates:  No Known Allergies    Family History     Problem Relation (Age of Onset)    No Known Problems Mother, Father, Sister, Brother, Maternal Aunt, Maternal Uncle, Paternal Aunt, Paternal Uncle, Maternal Grandmother, Maternal Grandfather, Paternal Grandmother, Paternal Grandfather          Social History Main Topics    Smoking status: Former Smoker    Smokeless tobacco: Former User    Alcohol use No    Drug use: No    Sexual activity: Not on file        Review of Systems   Constitutional: Positive for fatigue.   Respiratory:  Positive for shortness of breath and wheezing.    Cardiovascular: Positive for palpitations and leg swelling.   Gastrointestinal: Negative.    Musculoskeletal: Positive for arthralgias.   Neurological: Negative.    Hematological: Negative.    Psychiatric/Behavioral: Negative.      Objective:     Vital Signs (Most Recent):  Temp: 97.2 °F (36.2 °C) (06/29/17 0500)  Pulse: 89 (06/29/17 0828)  Resp: 16 (06/29/17 0828)  BP: 124/67 (06/29/17 0600)  SpO2: (!) 94 % (06/29/17 0828) Vital Signs (24h Range):  Temp:  [97.2 °F (36.2 °C)-98 °F (36.7 °C)] 97.2 °F (36.2 °C)  Pulse:  [70-89] 89  Resp:  [16-30] 16  SpO2:  [77 %-100 %] 94 %  BP: (103-145)/(49-94) 124/67     Weight: 98.8 kg (217 lb 13 oz)  Body mass index is 30.38 kg/m².      Intake/Output Summary (Last 24 hours) at 06/29/17 1050  Last data filed at 06/29/17 0630   Gross per 24 hour   Intake              180 ml   Output             1126 ml   Net             -946 ml       Physical Exam   Constitutional: He is oriented to person, place, and time. He appears well-developed and well-nourished.   HENT:   Head: Normocephalic and atraumatic.   Eyes: Conjunctivae are normal. Pupils are equal, round, and reactive to light.   Neck: Neck supple. JVD present. No tracheal deviation present. No thyromegaly present.   Cardiovascular: Normal rate.  An irregularly irregular rhythm present. PMI is displaced.    Murmur heard.   Systolic murmur is present with a grade of 2/6   Pulmonary/Chest: Effort normal. He has decreased breath sounds. He has rales in the right lower field and the left lower field.   Abdominal: Soft.   Musculoskeletal: Normal range of motion. He exhibits edema.   Lymphadenopathy:     He has no cervical adenopathy.   Neurological: He is alert and oriented to person, place, and time.   Skin: Skin is warm and dry.   Nursing note and vitals reviewed.      Vents:  Oxygen Concentration (%): 45 (06/29/17 0305)    Lines/Drains/Airways     Peripheral Intravenous Line                  Peripheral IV - Single Lumen 06/28/17 1451 Right Antecubital less than 1 day                Significant Labs:    CBC/Anemia Profile:    Recent Labs  Lab 06/28/17  1451   WBC 8.01   HGB 12.7*   HCT 41.2      MCV 91   RDW 15.5*        Chemistries:    Recent Labs  Lab 06/28/17  1451      K 4.0   CL 92*   CO2 37*   BUN 34*   CREATININE 2.0*   CALCIUM 9.2   ALBUMIN 3.8   PROT 7.2   BILITOT 0.5   ALKPHOS 38*   ALT 12   AST 24   MG 2.3       BMP:     Recent Labs  Lab 06/28/17  1451   *      K 4.0   CL 92*   CO2 37*   BUN 34*   CREATININE 2.0*   CALCIUM 9.2   MG 2.3     CMP:     Recent Labs  Lab 06/28/17  1451      K 4.0   CL 92*   CO2 37*   *   BUN 34*   CREATININE 2.0*   CALCIUM 9.2   PROT 7.2   ALBUMIN 3.8   BILITOT 0.5   ALKPHOS 38*   AST 24   ALT 12   ANIONGAP 9   EGFRNONAA 27*     Cardiac Markers: No results for input(s): CKMB, TROPONINT, MYOGLOBIN in the last 48 hours.  Coagulation:     Recent Labs  Lab 06/28/17  1451   INR 1.2   APTT 26.6     POCT Glucose:     Recent Labs  Lab 06/28/17 2058 06/29/17  0559   POCTGLUCOSE 196* 91     All pertinent labs within the past 24 hours have been reviewed.    Significant Imaging: CXR: I have reviewed all pertinent results/findings within the past 24 hours and my personal findings are:  cardiomegaly , left pleural effusion ( new) and pacemaker    Assessment/Plan:     Active Diagnoses:    Diagnosis Date Noted POA    PRINCIPAL PROBLEM:  Acute respiratory failure with hypoxia and hypercapnia [J96.01, J96.02] 06/28/2017 Yes    Chronic systolic CHF (congestive heart failure) [I50.22] 04/26/2016 Yes    CKD (chronic kidney disease) stage 3, GFR 30-59 ml/min [N18.3] 08/24/2014 Yes    HTN (hypertension) [I10] 08/24/2014 Yes    DM type 2, uncontrolled, with renal complications [E11.29, E11.65] 08/24/2014 Yes     Chronic      Problems Resolved During this Admission:    Diagnosis Date Noted Date Resolved POA          Critical Care Medicine  Daily Checklist:    A: Awake: RASS Goal/Actual Goal:    Actual:     B: Spontaneous Breathing Trial Performed?     C: SAT & SBT Coordinated?  na                      D: Delirium: CAM-ICU     E: Early Mobility Performed? Yes   F: Feeding Goal:    Status:     Current Diet Order   Procedures    Diet Diabetic 2000 Calories Fluid - 1500mL; Low Sodium,2gm     Order Specific Question:   Fluid restriction:     Answer:   Fluid - 1500mL     Order Specific Question:   Additional restrictions:     Answer:   Low Sodium,2gm      AS: Analgesia/Sedation adequate   T: Thromboembolic Prophylaxis Y   H: HOB > 300 Yes   U: Stress Ulcer Prophylaxis (if needed) Y   G: Glucose Control adequate   B: Bowel Function Stool Occurrence: 0   I: Indwelling Catheter (Lines & Zarate) Necessity needed   D: De-escalation of Antimicrobials/Pharmacotherapies na    Plan for the day/ETD Diuresis, mopnitor response, transfer to floor    Code Status:  Family/Goals of Care: Full Code  discussed     Critical Care Time: 40 min  Critical secondary to Patient has a condition that poses threat to life and bodily function: Severe Respiratory Distress and CHF      Critical care was time spent personally by me on the following activities: development of treatment plan with patient or surrogate and bedside caregivers, discussions with consultants, evaluation of patient's response to treatment, examination of patient, ordering and performing treatments and interventions, ordering and review of laboratory studies, ordering and review of radiographic studies, pulse oximetry, re-evaluation of patient's condition.  This critical care time did not overlap with that of any other provider or involve time for any procedures.    Thank you for your consult. I will follow-up with patient. Please contact us if you have any additional questions.     Anand Jimenez MD  Critical Care Medicine  [facility]

## 2017-06-29 NOTE — PLAN OF CARE
06/29/17 1050   Medicare Message   Important Message from Medicare regarding Discharge Appeal Rights Given to patient/caregiver;Explained to patient/caregiver;Signed/date by patient/caregiver   Date IMM was signed 06/29/17   Time IMM was signed 1050

## 2017-06-29 NOTE — PLAN OF CARE
"D/C assessment completed. Met with the patient and the family. Patient stated he has good support from his kids. His wife and daughter  within 2 days of each other.  Patient has O2 at home and he stated he had HH before with [facility].  CM left contact info with his daughter Elena. CM asked if the sister that is at the patient's home would look for a HH folder and on the O2 concentrator for the name of the companies that were previously used. Patient is requesting a "Metered Dose" O2 tank.  CM to f/u and make arrangement for HH post d/c. Patient and family in agreement.   1745- CM consult completed. Verbal consent for [facility] and preference form signed and witness by the patient's son in law. Copy to the patient and the blue folder.     17 1045   Discharge Assessment   Assessment Type Discharge Planning Assessment   Confirmed/corrected address and phone number on facesheet? Yes   Assessment information obtained from? Patient;Caregiver;Medical Record   Expected Length of Stay (days) (TBD)   Communicated expected length of stay with patient/caregiver yes   Type of Healthcare Directive Received Advance Directive;Living will;Other (Comment)  (Written info givne for Advance Directive, Living Will  and panphlet for d/c planning begins on admission)   If Healthcare Directive is received, is it scanned into Epic? no (comment)   Prior to hospitilization cognitive status: Alert/Oriented   Prior to hospitalization functional status: Assistive Equipment   Current cognitive status: Alert/Oriented   Current Functional Status: Needs Assistance   Arrived From home or self-care   Lives With alone  (Spouse  on 17 here at [facility])   Able to Return to Prior Arrangements yes   Is patient able to care for self after discharge? Unable to determine at this time (comments)   How many people do you have in your home that can help with your care after discharge? 0   Who are your caregiver(s) and their phone " number(s)? (Patient has sitters to come 4 hr in AM and 4 hr in PM)   Patient's perception of discharge disposition home or selfcare   Readmission Within The Last 30 Days no previous admission in last 30 days   Patient currently being followed by outpatient case management? No   Patient currently receives home health services? No   Does the patient currently use HME? Yes   Name and contact number for HME provider: patient cannot recall   Patient currently receives private duty nursing? No   Patient currently receives any other outside agency services? Yes   Is it the patient/care giver preference to resume care with the current outside agency? Yes   Equipment Currently Used at Home rollator;cane, straight;glucometer   Do you have any problems affording any of your prescribed medications? No   Is the patient taking medications as prescribed? yes   Do you have any financial concerns preventing you from receiving the healthcare you need? No   Does the patient have transportation to healthcare appointments? Yes   Transportation Available car;family or friend will provide   On Dialysis? No   Does the patient receive services at the Coumadin Clinic? No   Are there any open cases? No   Discharge Plan A Home with family;Home Health   Discharge Plan B Home with family   Patient/Family In Agreement With Plan yes

## 2017-06-29 NOTE — SUBJECTIVE & OBJECTIVE
Interval History:  Weaned from BiPAP overnight.  Comfortable on nasal cannula.  Mild dyspnea on exertion.    Review of Systems   Constitutional: Negative.  Negative for chills and fever.   HENT: Negative.  Negative for congestion and sore throat.    Eyes: Negative.  Negative for visual disturbance.   Respiratory: Positive for shortness of breath. Negative for cough and wheezing.    Cardiovascular: Negative.  Negative for chest pain.   Gastrointestinal: Negative for abdominal pain, diarrhea, nausea and vomiting.   Endocrine: Negative.    Genitourinary: Negative.    Musculoskeletal: Negative.  Negative for myalgias and neck stiffness.   Skin: Negative.  Negative for color change and pallor.   Allergic/Immunologic: Negative.    Neurological: Negative.    Hematological: Negative.    Psychiatric/Behavioral: Negative.    All other systems reviewed and are negative.    Objective:     Vital Signs (Most Recent):  Temp: 97.2 °F (36.2 °C) (06/29/17 0500)  Pulse: 89 (06/29/17 0828)  Resp: 16 (06/29/17 0828)  BP: 124/67 (06/29/17 0600)  SpO2: (!) 94 % (06/29/17 0828) Vital Signs (24h Range):  Temp:  [97.2 °F (36.2 °C)-98 °F (36.7 °C)] 97.2 °F (36.2 °C)  Pulse:  [70-89] 89  Resp:  [16-30] 16  SpO2:  [77 %-100 %] 94 %  BP: (103-145)/(49-94) 124/67     Weight: 98.8 kg (217 lb 13 oz)  Body mass index is 30.38 kg/m².    Intake/Output Summary (Last 24 hours) at 06/29/17 1031  Last data filed at 06/29/17 0630   Gross per 24 hour   Intake              180 ml   Output             1126 ml   Net             -946 ml      Physical Exam   Constitutional: He is oriented to person, place, and time. He appears well-developed and well-nourished. No distress.   HENT:   Head: Normocephalic and atraumatic.   Mouth/Throat: Oropharynx is clear and moist.   Eyes: Conjunctivae and EOM are normal. Pupils are equal, round, and reactive to light.   Neck: No JVD present. No thyromegaly present.   Cardiovascular: Normal rate, regular rhythm and normal heart  sounds.  Exam reveals no gallop and no friction rub.    No murmur heard.  Pulmonary/Chest: Effort normal and breath sounds normal. No respiratory distress. He has no wheezes. He has no rales.   Abdominal: Soft. Bowel sounds are normal. He exhibits no distension. There is no tenderness. There is no rebound and no guarding.   Musculoskeletal: Normal range of motion. He exhibits no edema or tenderness.   Lymphadenopathy:     He has no cervical adenopathy.   Neurological: He is alert and oriented to person, place, and time. He has normal reflexes. He displays normal reflexes. No cranial nerve deficit.   Skin: Skin is warm and dry. No rash noted. He is not diaphoretic. No erythema.   Psychiatric: He has a normal mood and affect. His behavior is normal. Judgment and thought content normal.       Significant Labs:   CBC:   Recent Labs  Lab 06/28/17  1451   WBC 8.01   HGB 12.7*   HCT 41.2        CMP:   Recent Labs  Lab 06/28/17  1451      K 4.0   CL 92*   CO2 37*   *   BUN 34*   CREATININE 2.0*   CALCIUM 9.2   PROT 7.2   ALBUMIN 3.8   BILITOT 0.5   ALKPHOS 38*   AST 24   ALT 12   ANIONGAP 9   EGFRNONAA 27*       Significant Imaging: I have reviewed all pertinent imaging results/findings within the past 24 hours.

## 2017-06-30 VITALS
HEIGHT: 71 IN | SYSTOLIC BLOOD PRESSURE: 111 MMHG | WEIGHT: 215.38 LBS | OXYGEN SATURATION: 90 % | BODY MASS INDEX: 30.15 KG/M2 | TEMPERATURE: 99 F | RESPIRATION RATE: 20 BRPM | HEART RATE: 84 BPM | DIASTOLIC BLOOD PRESSURE: 58 MMHG

## 2017-06-30 LAB
ALBUMIN SERPL BCP-MCNC: 3.2 G/DL
ANION GAP SERPL CALC-SCNC: 9 MMOL/L
BUN SERPL-MCNC: 27 MG/DL
CALCIUM SERPL-MCNC: 8.7 MG/DL
CHLORIDE SERPL-SCNC: 94 MMOL/L
CO2 SERPL-SCNC: 40 MMOL/L
CREAT SERPL-MCNC: 1.6 MG/DL
EST. GFR  (AFRICAN AMERICAN): 41 ML/MIN/1.73 M^2
EST. GFR  (NON AFRICAN AMERICAN): 35 ML/MIN/1.73 M^2
ESTIMATED AVG GLUCOSE: 174 MG/DL
GLUCOSE SERPL-MCNC: 134 MG/DL
HBA1C MFR BLD HPLC: 7.7 %
PHOSPHATE SERPL-MCNC: 3.2 MG/DL
POCT GLUCOSE: 223 MG/DL (ref 70–110)
POTASSIUM SERPL-SCNC: 3.6 MMOL/L
SODIUM SERPL-SCNC: 143 MMOL/L

## 2017-06-30 PROCEDURE — 63600175 PHARM REV CODE 636 W HCPCS: Performed by: SPECIALIST

## 2017-06-30 PROCEDURE — 94640 AIRWAY INHALATION TREATMENT: CPT

## 2017-06-30 PROCEDURE — 25000003 PHARM REV CODE 250: Performed by: INTERNAL MEDICINE

## 2017-06-30 PROCEDURE — 25000003 PHARM REV CODE 250: Performed by: NURSE PRACTITIONER

## 2017-06-30 PROCEDURE — 80069 RENAL FUNCTION PANEL: CPT

## 2017-06-30 PROCEDURE — 25000242 PHARM REV CODE 250 ALT 637 W/ HCPCS: Performed by: NURSE PRACTITIONER

## 2017-06-30 PROCEDURE — 27000221 HC OXYGEN, UP TO 24 HOURS

## 2017-06-30 PROCEDURE — 63600175 PHARM REV CODE 636 W HCPCS: Performed by: INTERNAL MEDICINE

## 2017-06-30 PROCEDURE — 36415 COLL VENOUS BLD VENIPUNCTURE: CPT

## 2017-06-30 RX ORDER — AZITHROMYCIN 250 MG/1
250 TABLET, FILM COATED ORAL DAILY
Qty: 4 TABLET | Refills: 0 | Status: SHIPPED | OUTPATIENT
Start: 2017-07-01 | End: 2017-07-05

## 2017-06-30 RX ORDER — BUMETANIDE 2 MG/1
2 TABLET ORAL DAILY
Qty: 60 TABLET | Refills: 6
Start: 2017-06-30 | End: 2017-09-06 | Stop reason: SDUPTHER

## 2017-06-30 RX ADMIN — DOCUSATE SODIUM 100 MG: 100 CAPSULE, LIQUID FILLED ORAL at 08:06

## 2017-06-30 RX ADMIN — IPRATROPIUM BROMIDE AND ALBUTEROL SULFATE 3 ML: .5; 3 SOLUTION RESPIRATORY (INHALATION) at 07:06

## 2017-06-30 RX ADMIN — TAMSULOSIN HYDROCHLORIDE 0.4 MG: 0.4 CAPSULE ORAL at 08:06

## 2017-06-30 RX ADMIN — ASPIRIN 81 MG: 81 TABLET, COATED ORAL at 08:06

## 2017-06-30 RX ADMIN — IPRATROPIUM BROMIDE AND ALBUTEROL SULFATE 3 ML: .5; 3 SOLUTION RESPIRATORY (INHALATION) at 01:06

## 2017-06-30 RX ADMIN — AZITHROMYCIN MONOHYDRATE 500 MG: 500 INJECTION, POWDER, LYOPHILIZED, FOR SOLUTION INTRAVENOUS at 11:06

## 2017-06-30 RX ADMIN — INSULIN DETEMIR 10 UNITS: 100 INJECTION, SOLUTION SUBCUTANEOUS at 08:06

## 2017-06-30 RX ADMIN — HEPARIN SODIUM 5000 UNITS: 5000 INJECTION, SOLUTION INTRAVENOUS; SUBCUTANEOUS at 06:06

## 2017-06-30 RX ADMIN — AMIODARONE HYDROCHLORIDE 200 MG: 200 TABLET ORAL at 08:06

## 2017-06-30 RX ADMIN — FUROSEMIDE 40 MG: 10 INJECTION, SOLUTION INTRAMUSCULAR; INTRAVENOUS at 08:06

## 2017-06-30 RX ADMIN — INSULIN ASPART 4 UNITS: 100 INJECTION, SOLUTION INTRAVENOUS; SUBCUTANEOUS at 11:06

## 2017-06-30 RX ADMIN — FAMOTIDINE 20 MG: 20 TABLET ORAL at 08:06

## 2017-06-30 NOTE — PLAN OF CARE
Assessment completed. Met with the patient and the family. Explained all appt and F/U with HH. Spoke with Lori regarding the smaller O2 tank. She stated they do not carry M6. The patient is not eligible for an equipment change due to he has been capped and having O2 since 2014. Patient  Verbalized understanding. And has no further needs at this time.     06/30/17 1112   Final Note   Assessment Type Final Discharge Note   Discharge Disposition Home-Health   Discharge planning education complete? Yes   Hospital Follow Up  Appt(s) scheduled? Yes   Discharge plans and expectations educations in teach back method with documentation complete? Yes   [email or web address] Bedside Delivery? Yes   Discharge/Hospital Encounter Summary to [facility] No   Referral to Outpatient Case Management complete? No   Referral to / orders for Home Health Complete? Yes   30 day supply of medicines given at discharge, if documented non-compliance / non-adherence? Yes   Any social issues identified prior to discharge? No   Did you assess the readiness or willingness of the family or caregiver to support self management of care? Yes

## 2017-06-30 NOTE — PLAN OF CARE
Problem: Patient Care Overview  Goal: Plan of Care Review  Outcome: Ongoing (interventions implemented as appropriate)  Patient had an uneventful night with no acute issues. Slept for a couple hours after given PRN benadryl for insomnia, but awakened frequently.  Paced on telemetry monitor and BP stable.  O2 sats via pulse oximetry >92% on 4lpm NC.  Fluid balance -2566mL since admit, -1620mL in last 24 hours.  Patient voiding spont and without difficulty.  Weight this AM 97.7kg.  Patient ambulating with assistance to bedside toilet.  Moderate edema present in Bilat upper and lower extremities. Patient and family updated to plan of care with no questions at this time.

## 2017-06-30 NOTE — PLAN OF CARE
Patient son forgot the oxygen at home. They stay 40-45 minutes away. CM contacted [facility] and asked about a tank for the patient to travel home. MA[employer]SA was told the patient out of pocket fee  is $125.00 for a new setup. Patient is not able to afford it. The patient wife  on 17 [facility] at [facility]. His daughter  17. The patient and family have gone through a lot emotionally and with the cost of 2 burials. Patient agreed to be transported home via [employer] in W/C and oxygen was requested. .  CM faxed paperwork to [facility] [phone].

## 2017-07-01 NOTE — HOSPITAL COURSE
Admitted to the ICU on rescue BiPAP for hypoxemia and hypercapneic respiratory failure.  Started IV diuresis and breathing treatments with supplemental oxygen.  Steady improvement toward baseline.  Continued to complain of cough mildly productive.  Remained hemodynamically stable throughout.  Tolerated a cardiac diet with fluid and Sodium restriction.  Started Azithromycin for probable bronchitis first dose  mg.  Discharge plan to take additional dose of daily Bumetanide for two days and complete an additional four days of Azithromycin 250 mg tablets.  Resume diet and fluid restrictions.  Follow up with PCP in the next week.  I have seen and examined Mr. Russo on the day of discharge and deemed him appropriate to return home.

## 2017-07-01 NOTE — DISCHARGE SUMMARY
"[facility] Hospital Medicine  Discharge Summary      Patient Name: Rajiv Russo  MRN: [id number]  Admission Date: 2017  Hospital Length of Stay: 2 days  Discharge Date and Time: 2017  2:35 PM  Attending Physician: Vladimir Acevedo MD  Discharging Provider: Vladimir Acevedo MD  Primary Care Provider: Dean Soto Jr, MD      HPI:   "Fluid on lungs" and more swollen.  Difficulty breathing at night and "smothering" got him to come to the ER.  Dry cough.  Normally weighs about 208 pounds.  Took an additional diuretic last night but still has not increased urine output.  His wife  suddenly on Monday and one of their daughters  .  The  was today and they were  for 72 years.  He has been drinking a lot of coffee the last couple of days.  He also developed some pain with urination and his urine smells abnormal.  When he feels the smothering he also has generalized chest pain that goes away when his breathing improves.  Follows outpatient with Dr. Sanchez of Cardiology.    * No surgery found *      Indwelling Lines/Drains at time of discharge:   Lines/Drains/Airways          No matching active lines, drains, or airways        Hospital Course:   Admitted to the ICU on rescue BiPAP for hypoxemia and hypercapneic respiratory failure.  Started IV diuresis and breathing treatments with supplemental oxygen.  Steady improvement toward baseline.  Continued to complain of cough mildly productive.  Remained hemodynamically stable throughout.  Tolerated a cardiac diet with fluid and Sodium restriction.  Started Azithromycin for probable bronchitis first dose  mg.  Discharge plan to take additional dose of daily Bumetanide for two days and complete an additional four days of Azithromycin 250 mg tablets.  Resume diet and fluid restrictions.  Follow up with PCP in the next week.  I have seen and examined Mr. Russo on the day of discharge and deemed him appropriate " to return home.     Consults:   Consults         Status Ordering Provider     Inpatient consult to Pulmonology  Once     Provider:  (Not yet assigned)    Completed BULMARO CHARLES     Inpatient consult to Social Work  Once     Provider:  (Not yet assigned)    Completed RATNA SANDOVAL Diagnostic Studies: Labs:   CMP   Recent Labs  Lab 06/29/17  1334 06/30/17  0424    143   K 3.8 3.6   CL 94* 94*   CO2 40* 40*   * 134*   BUN 31* 27   CREATININE 1.7* 1.6*   CALCIUM 8.7 8.7   ALBUMIN 3.3* 3.2*   ANIONGAP 6* 9   ESTGFRAFRICA 38* 41*   EGFRNONAA 33* 35*    and CBC   Recent Labs  Lab 06/29/17  1334   WBC 7.04   HGB 12.0*   HCT 39.4*          Pending Diagnostic Studies:     None        Final Active Diagnoses:    Diagnosis Date Noted POA    PRINCIPAL PROBLEM:  Acute respiratory failure with hypoxia and hypercapnia [J96.01, J96.02] 06/28/2017 Yes    Chronic systolic CHF (congestive heart failure) [I50.22] 04/26/2016 Yes    CKD (chronic kidney disease) stage 3, GFR 30-59 ml/min [N18.3] 08/24/2014 Yes    HTN (hypertension) [I10] 08/24/2014 Yes    DM type 2, uncontrolled, with renal complications [E11.29, E11.65] 08/24/2014 Yes     Chronic      Problems Resolved During this Admission:    Diagnosis Date Noted Date Resolved POA      No new Assessment & Plan notes have been filed under this hospital service since the last note was generated.  Service: Hospital Medicine      Discharged Condition: good    Disposition: [place] [place] [facility]    Follow Up:  Follow-up Information     Dean Soto Jr, MD On 7/5/2017.    Specialty:  Family Medicine  Why:   Appt time 2 pm    - Hospital follow up.  Check renal function.  Contact information:  [address]  [facility]  [facility] [address] [phone]                 Patient Instructions:     Diet Cardiac       Medications:  Reconciled Home Medications:   Discharge Medication List as of 6/30/2017 12:27 PM      START taking  "these medications    Details   azithromycin (Z-MILADY) 250 MG tablet Take 1 tablet (250 mg total) by mouth once daily., Starting Sat 7/1/2017, Until Wed 7/5/2017, Normal         CONTINUE these medications which have CHANGED    Details   bumetanide (BUMEX) 2 MG tablet Take 1 tablet (2 mg total) by mouth once daily. Take one tablet twice a day for two days then resume normal once daily, Starting Fri 6/30/2017, No Print         CONTINUE these medications which have NOT CHANGED    Details   amiodarone (PACERONE) 200 MG Tab Take 1 tablet (200 mg total) by mouth 2 (two) times daily., Starting 12/22/2016, Until Fri 12/22/17, Normal      aspirin (ECOTRIN) 81 MG EC tablet Take 81 mg by mouth once daily., Until Discontinued, Historical Med      atorvastatin (LIPITOR) 20 MG tablet Take 1 tablet (20 mg total) by mouth every evening., Starting 1/5/2017, Until Fri 1/5/18, Normal      BD INSULIN PEN NEEDLE UF MINI 31 gauge x 3/16" Ndle Starting 12/16/2015, Until Discontinued, Historical Med      ciprofloxacin HCl (CIPRO) 500 MG tablet Take 1 tablet (500 mg total) by mouth 2 (two) times daily., Starting 3/22/2017, Until Discontinued, Normal      cyanocobalamin 1,000 mcg/mL injection Starting 6/21/2016, Until Discontinued, Historical Med      fenofibrate 160 MG Tab Take 160 mg by mouth once daily., Until Discontinued, Historical Med      glipiZIDE (GLUCOTROL) 2.5 MG TR24 Take 2.5 mg by mouth 2 (two) times daily with meals. , Until Discontinued, Historical Med      insulin glargine (LANTUS) 100 unit/mL injection Inject 15 Units into the skin 2 (two) times daily. , Until Discontinued, Historical Med      losartan (COZAAR) 25 MG tablet Take 0.5 tablets (12.5 mg total) by mouth once daily., Starting 5/4/2016, Until Thu 5/4/17, Normal      metoprolol succinate (TOPROL-XL) 25 MG 24 hr tablet Take 1 tablet (25 mg total) by mouth every evening., Starting 4/16/2016, Until Sun 4/16/17, Normal      nitroGLYCERIN (NITROSTAT) 0.4 MG SL tablet Place " 1 tablet (0.4 mg total) under the tongue every 5 (five) minutes as needed for Chest pain., Starting 12/22/2016, Until Discontinued, Normal      oxycodone-acetaminophen (PERCOCET) 5-325 mg per tablet Take 1 tablet by mouth every 6 (six) hours as needed., Starting 3/21/2017, Until Discontinued, Print      potassium chloride SA (K-DUR,KLOR-CON) 10 MEQ tablet Take 10 mEq by mouth once., Historical Med      tamsulosin (FLOMAX) 0.4 mg Cp24 Take 0.4 mg by mouth., Until Discontinued, Historical Med           Time spent on the discharge of patient: 30 minutes    Vladimir Acevedo MD  Department of Hospital Medicine  [facility]

## 2017-07-02 LAB
POCT GLUCOSE: 124 MG/DL (ref 70–110)
POCT GLUCOSE: 196 MG/DL (ref 70–110)

## 2017-07-11 ENCOUNTER — TELEPHONE (OUTPATIENT)
Dept: PULMONOLOGY | Facility: CLINIC | Age: 82
End: 2017-07-11

## 2017-07-11 NOTE — TELEPHONE ENCOUNTER
----- Message from Umm Bryant sent at 7/11/2017 11:36 AM CDT -----  Contact: Cintia/daughter  Please call pt daughter @ [phone] regarding referral sent over from Dr. Moran, states pt is having some issues.

## 2017-07-14 ENCOUNTER — OFFICE VISIT (OUTPATIENT)
Dept: PULMONOLOGY | Facility: CLINIC | Age: 82
End: 2017-07-14
Payer: COMMERCIAL

## 2017-07-14 ENCOUNTER — LAB VISIT (OUTPATIENT)
Dept: LAB | Facility: HOSPITAL | Age: 82
End: 2017-07-14
Attending: INTERNAL MEDICINE
Payer: MEDICARE

## 2017-07-14 VITALS
OXYGEN SATURATION: 93 % | SYSTOLIC BLOOD PRESSURE: 108 MMHG | HEIGHT: 71 IN | RESPIRATION RATE: 20 BRPM | WEIGHT: 218 LBS | BODY MASS INDEX: 30.52 KG/M2 | HEART RATE: 84 BPM | DIASTOLIC BLOOD PRESSURE: 60 MMHG

## 2017-07-14 DIAGNOSIS — R06.00 DYSPNEA AND RESPIRATORY ABNORMALITIES: Chronic | ICD-10-CM

## 2017-07-14 DIAGNOSIS — I50.23 ACUTE ON CHRONIC SYSTOLIC (CONGESTIVE) HEART FAILURE: ICD-10-CM

## 2017-07-14 DIAGNOSIS — R06.89 DYSPNEA AND RESPIRATORY ABNORMALITIES: Chronic | ICD-10-CM

## 2017-07-14 DIAGNOSIS — I50.23 ACUTE ON CHRONIC SYSTOLIC (CONGESTIVE) HEART FAILURE: Primary | ICD-10-CM

## 2017-07-14 LAB
ALBUMIN SERPL BCP-MCNC: 3.5 G/DL
ALP SERPL-CCNC: 41 U/L
ALT SERPL W/O P-5'-P-CCNC: 12 U/L
ANION GAP SERPL CALC-SCNC: 11 MMOL/L
AST SERPL-CCNC: 26 U/L
BILIRUB SERPL-MCNC: 0.6 MG/DL
BNP SERPL-MCNC: 951 PG/ML
BUN SERPL-MCNC: 25 MG/DL
CALCIUM SERPL-MCNC: 9.1 MG/DL
CHLORIDE SERPL-SCNC: 88 MMOL/L
CO2 SERPL-SCNC: 37 MMOL/L
CREAT SERPL-MCNC: 1.8 MG/DL
EST. GFR  (AFRICAN AMERICAN): 35.4 ML/MIN/1.73 M^2
EST. GFR  (NON AFRICAN AMERICAN): 30.6 ML/MIN/1.73 M^2
GLUCOSE SERPL-MCNC: 211 MG/DL
POTASSIUM SERPL-SCNC: 4.7 MMOL/L
PROT SERPL-MCNC: 6.6 G/DL
SODIUM SERPL-SCNC: 136 MMOL/L

## 2017-07-14 PROCEDURE — 36415 COLL VENOUS BLD VENIPUNCTURE: CPT

## 2017-07-14 PROCEDURE — 99999 PR PBB SHADOW E&M-EST. PATIENT-LVL III: CPT | Mod: PBBFAC,,, | Performed by: INTERNAL MEDICINE

## 2017-07-14 PROCEDURE — 99499 UNLISTED E&M SERVICE: CPT | Mod: S$GLB,,, | Performed by: INTERNAL MEDICINE

## 2017-07-14 PROCEDURE — 99215 OFFICE O/P EST HI 40 MIN: CPT | Mod: S$GLB,,, | Performed by: INTERNAL MEDICINE

## 2017-07-14 PROCEDURE — 80053 COMPREHEN METABOLIC PANEL: CPT

## 2017-07-14 PROCEDURE — 1126F AMNT PAIN NOTED NONE PRSNT: CPT | Mod: S$GLB,,, | Performed by: INTERNAL MEDICINE

## 2017-07-14 PROCEDURE — 1159F MED LIST DOCD IN RCRD: CPT | Mod: S$GLB,,, | Performed by: INTERNAL MEDICINE

## 2017-07-14 PROCEDURE — 83880 ASSAY OF NATRIURETIC PEPTIDE: CPT

## 2017-07-14 RX ORDER — ALPRAZOLAM 0.5 MG/1
TABLET ORAL
COMMUNITY
Start: 2017-07-07 | End: 2017-08-03 | Stop reason: SDUPTHER

## 2017-07-14 RX ORDER — TIOTROPIUM BROMIDE 18 UG/1
CAPSULE ORAL; RESPIRATORY (INHALATION)
COMMUNITY
Start: 2017-06-14 | End: 2017-08-07 | Stop reason: SDUPTHER

## 2017-07-14 RX ORDER — METOLAZONE 5 MG/1
5 TABLET ORAL DAILY
Qty: 30 TABLET | Refills: 11 | Status: SHIPPED | OUTPATIENT
Start: 2017-07-14 | End: 2017-09-06 | Stop reason: SDUPTHER

## 2017-07-14 NOTE — LETTER
July 14, 2017      Dean Soto Jr., MD  [address] [facility]  Primary Care [facility] [address]           [facility]  [address] [place] [address]  Phone: [phone]  Fax: [phone]          Patient: Rajiv Russo   MR Number: [id number]   YOB: 1919   Date of Visit: 7/14/2017       Dear Dr. Dean Soto Jr.:    Thank you for referring Rajiv Russo to me for evaluation. Attached you will find relevant portions of my assessment and plan of care.    If you have questions, please do not hesitate to call me. I look forward to following Rajiv Russo along with you.    Sincerely,    Jer Gonsalves MD    Enclosure  CC:  No Recipients    If you would like to receive this communication electronically, please contact [email or web address] or [phone] to request more information on [employer] Link access.    For providers and/or their staff who would like to refer a patient to [facility], please contact us through our one-stop-shop provider referral line, [facility], at [phone].    If you feel you have received this communication in error or would no longer like to receive these types of communications, please e-mail [email or web address]

## 2017-07-14 NOTE — ASSESSMENT & PLAN NOTE
Etiology unclear.  Multifactorial etiology suspected.  Likely contributors to etiology ( checked)    [x] Pulmonary airway disease    []  Pulmonary parenchymal disease  [] Pulmonary vascular disease   [] Pleural disease  [] Pulmonary vasculitis  [x] Hypoventilation ( chest wall deformity, neuromuscular disease, obesity etc)  [] Anemia  [] Thyroid disease.  [] Cardiac illess  [x]         Sleep disorder    EVALUATION:    -     Complete PFT with bronchodilator; Future  -     Stress test, pulmonary; Future  -     PULM - Arterial Blood Gases--in addition to PFT only; Future  -     X-Ray Chest PA And Lateral; Future; Expected date: 01/12/2018

## 2017-07-14 NOTE — PROGRESS NOTES
New patient    Subjective:      Patient ID: Rajiv Russo is a 89 y.o. male.    Patient Active Problem List   Diagnosis    Hypoxemia    CAD (coronary artery disease)    DM type 2, uncontrolled, with renal complications    HTN (hypertension)    Anemia    CKD (chronic kidney disease) stage 3, GFR 30-59 ml/min    Troponin I above reference range    Nonexudative senile macular degeneration of retina    Type 2 diabetes mellitus without complication    Ischemic chest pain    Angina of effort    Nonrheumatic aortic valve stenosis    Cardiomyopathy    Unstable angina    Acute on chronic systolic (congestive) heart failure    Cellulitis of leg    Acute respiratory failure with hypoxia and hypercapnia    Dyspnea and respiratory abnormalities       Problem list has been reviewed.    he has been referred by Dean Soto Jr.,* for evaluation and management for   Chief Complaint   Patient presents with    Other     Pleural Effusion       Chief Complaint: Other (Pleural Effusion)      HPI:  He has small bilateral pleural effusion and pulmonary vascular congestion due t o his CHF. he reports chronic dyspnea with exertion which is stable and unchanged. Patient denies fevers, chills rigors, chest pain, pleurisy,  hemoptysis, family history of asthma, childhood history of asthma.  Patient does not have new pets. Patient does not have a history of asthma. Patient does not have a history of environmental allergens. Patient has traveled recently. Patient does have a history of smoking. Patient has had a previous chest x-ray. Patient has not had a PPD done. He is a former cigarette smoker. He smoked 1 PPD for for approximately 15 years. He quit smoking 60 years ago.     Previous Report Reviewed: office notes and radiology reports     Past Medical History: The following portions of the patient's history were reviewed and updated as appropriate:   He  has a past surgical history that includes colon removal. ;  "Cardiac pacemaker placement; Bladder surgery; and Cataract extraction.  His family history includes No Known Problems in his brother, father, maternal aunt, maternal grandfather, maternal grandmother, maternal uncle, mother, paternal aunt, paternal grandfather, paternal grandmother, paternal uncle, and sister.  He  reports that he has quit smoking. He has quit using smokeless tobacco. He reports that he does not drink alcohol or use drugs.  He has a current medication list which includes the following prescription(s): alprazolam, amiodarone, aspirin, atorvastatin, bd insulin pen needle uf mini, bumetanide, cyanocobalamin, fenofibrate, glipizide, insulin glargine, metoprolol succinate, nitroglycerin, oxycodone-acetaminophen, potassium chloride sa, spiriva with handihaler, tamsulosin, losartan, and metolazone.  He has No Known Allergies..    Review of Systems   Constitutional: Negative for fever, fatigue and night sweats.   Respiratory: Positive for snoring, cough, orthopnea, somnolence and Paroxysmal Nocturnal Dyspnea. Negative for apnea, choking and chest tightness.    Cardiovascular: Negative for chest pain and leg swelling.   Genitourinary: Negative for difficulty urinating.   Gastrointestinal: Positive for nausea.   Neurological: Negative for dizziness and headaches.        Occupational History:  Retired . He reports occupational exposure to asbestos. He denies occupational exposure to silica and petrochemicals.    Avocational History:  None currently.    Pet Exposures:  None     Objective:     Vitals:    07/14/17 1307   BP: 108/60   Pulse: 84   Resp: 20   SpO2: (!) 93%   Weight: 98.9 kg (218 lb)   Height: 5' 11" (1.803 m)   PainSc: 0-No pain     Body mass index is 30.4 kg/m².     Physical Exam   Constitutional: He is oriented to person, place, and time. He appears well-developed and well-nourished.   Eyes: EOM are normal. Pupils are equal, round, and reactive to light.   Neck: Normal range of motion. Neck " "supple.   Cardiovascular: Normal rate and regular rhythm.    Pulmonary/Chest: Effort normal. He has decreased breath sounds in the right lower field and the left lower field. He has rales.   Abdominal: Soft. Bowel sounds are normal.   Musculoskeletal: Normal range of motion. He exhibits edema (3+).   Neurological: He is alert and oriented to person, place, and time.   Skin: Skin is warm and dry. Capillary refill takes less than 2 seconds. There is erythema.   Stasis dermatitis bilateral LEs   Psychiatric: He has a normal mood and affect.   Nursing note and vitals reviewed.      Personal Diagnostic Review      Results for orders placed during the hospital encounter of 06/28/17   X-Ray Chest 1 View    Narrative Exam: Portable chest radiograph    Clinical History:   sob.     Comparison: Chest x-ray, 03/21/2017    Findings:.     There is interstitial pulmonary edema and small bilateral pleural effusions. The heart size is enlarged. No pneumothorax.  There is a left-sided dual-lead AICD.    Impression  Congestive heart failure with cardiomegaly, interstitial pulmonary edema, and small bilateral pleural effusions.            Electronically signed by: PROMISE HUERTA MD  Date:     06/28/17  Time:    15:22      No results found for this or any previous visit.    Assessment:     1. Acute on chronic systolic (congestive) heart failure Stable   2. Dyspnea and respiratory abnormalities Chronic     Outpatient Encounter Prescriptions as of 7/14/2017   Medication Sig Dispense Refill    alprazolam (XANAX) 0.5 MG tablet       amiodarone (PACERONE) 200 MG Tab Take 1 tablet (200 mg total) by mouth 2 (two) times daily. 60 tablet 11    aspirin (ECOTRIN) 81 MG EC tablet Take 81 mg by mouth once daily.      atorvastatin (LIPITOR) 20 MG tablet Take 1 tablet (20 mg total) by mouth every evening. 30 tablet 6    BD INSULIN PEN NEEDLE UF MINI 31 gauge x 3/16" Ndle       bumetanide (BUMEX) 2 MG tablet Take 1 tablet (2 mg total) by mouth once " daily. Take one tablet twice a day for two days then resume normal once daily 60 tablet 6    cyanocobalamin 1,000 mcg/mL injection       fenofibrate 160 MG Tab Take 160 mg by mouth once daily.      glipiZIDE (GLUCOTROL) 2.5 MG TR24 Take 2.5 mg by mouth 2 (two) times daily with meals.       insulin glargine (LANTUS) 100 unit/mL injection Inject 15 Units into the skin 2 (two) times daily.       metoprolol succinate (TOPROL-XL) 25 MG 24 hr tablet Take 1 tablet (25 mg total) by mouth every evening. 30 tablet 0    nitroGLYCERIN (NITROSTAT) 0.4 MG SL tablet Place 1 tablet (0.4 mg total) under the tongue every 5 (five) minutes as needed for Chest pain. 30 tablet 6    oxycodone-acetaminophen (PERCOCET) 5-325 mg per tablet Take 1 tablet by mouth every 6 (six) hours as needed. 12 tablet 0    potassium chloride SA (K-DUR,KLOR-CON) 10 MEQ tablet Take 10 mEq by mouth once.      SPIRIVA WITH HANDIHALER 18 mcg inhalation capsule       tamsulosin (FLOMAX) 0.4 mg Cp24 Take 0.4 mg by mouth.      losartan (COZAAR) 25 MG tablet Take 0.5 tablets (12.5 mg total) by mouth once daily. 90 tablet 3    metOLazone (ZAROXOLYN) 5 MG tablet Take 1 tablet (5 mg total) by mouth once daily. 30 tablet 11    [DISCONTINUED] ciprofloxacin HCl (CIPRO) 500 MG tablet Take 1 tablet (500 mg total) by mouth 2 (two) times daily. 20 tablet 0     No facility-administered encounter medications on file as of 7/14/2017.      Orders Placed This Encounter   Procedures    X-Ray Chest PA And Lateral     Standing Status:   Future     Standing Expiration Date:   8/24/2018    Brain natriuretic peptide     Standing Status:   Future     Standing Expiration Date:   9/12/2018    Comprehensive metabolic panel     Standing Status:   Future     Standing Expiration Date:   9/12/2018    Complete PFT with bronchodilator     Standing Status:   Future     Standing Expiration Date:   8/24/2018    Stress test, pulmonary     Standing Status:   Future     Standing  Expiration Date:   8/24/2018    PULM - Arterial Blood Gases--in addition to PFT only     Standing Status:   Future     Standing Expiration Date:   8/24/2018       Plan:     Discussed diagnosis, its evaluation, treatment and usual course. All questions answered.    Dyspnea and respiratory abnormalities  Etiology unclear.  Multifactorial etiology suspected.  Likely contributors to etiology ( checked)    [x] Pulmonary airway disease    []  Pulmonary parenchymal disease  [] Pulmonary vascular disease   [] Pleural disease  [] Pulmonary vasculitis  [x] Hypoventilation ( chest wall deformity, neuromuscular disease, obesity etc)  [] Anemia  [] Thyroid disease.  [] Cardiac illess  [x]         Sleep disorder    EVALUATION:    -     Complete PFT with bronchodilator; Future  -     Stress test, pulmonary; Future  -     PULM - Arterial Blood Gases--in addition to PFT only; Future  -     X-Ray Chest PA And Lateral; Future; Expected date: 01/12/2018        Acute on chronic systolic (congestive) heart failure  Ambulatory referral to cardiology     -     Brain natriuretic peptide; Future; Expected date: 07/14/2017  -     Comprehensive metabolic panel; Future; Expected date: 07/14/2017  -     metOLazone (ZAROXOLYN) 5 MG tablet; Take 1 tablet (5 mg total) by mouth once daily.  Dispense: 30 tablet; Refill: 11  -     Optimize CHF regimen.  -      Recommend referral to CHF clinic.  -      Preload and afterload reduction.  -      Daily weighing.  -      Dietary salt restriction.  -      Alcohol and tobacco avoidance.             TIME SPENT WITH PATIENT: Time spent: 60 minutes in face to face  discussion concerning diagnosis, prognosis, review of lab and test results, benefits of treatment as well as management of disease, counseling of patient and coordination of care between various health  care providers . Greater than half the time spent was used for coordination of care and counseling of patient.     Return in about 2 weeks (around  7/28/2017) for CHF, Shortness of breath.

## 2017-07-14 NOTE — PATIENT INSTRUCTIONS
Lung Anatomy  Your lungs take air in to give your body oxygen, which the body needs to work. Your lungs, like all the tissues in your body, are made up of billions of tiny specialized cells. Old lung cells die and are replaced by new, identical lung cells. This natural process helps ensure healthy lungs.    Date Last Reviewed: 11/1/2016  © 5695-9263 [employer]. [address] [place] [address]. All rights reserved. This information is not intended as a substitute for professional medical care. Always follow your healthcare professional's instructions.

## 2017-07-21 ENCOUNTER — TELEPHONE (OUTPATIENT)
Dept: PULMONOLOGY | Facility: CLINIC | Age: 82
End: 2017-07-21

## 2017-07-21 NOTE — TELEPHONE ENCOUNTER
----- Message from Eduard Lopez sent at 7/21/2017  1:14 PM CDT -----  Cintia Russo (Daughter) is requesting a call from nurse to discuss a home health nurse took some medication for his fluid. Cintia Russo (Daughter) states she has questions and concerns.          Please call Cintia Russo (Daughter) back at [phone]

## 2017-08-01 ENCOUNTER — TELEPHONE (OUTPATIENT)
Dept: CARDIOLOGY | Facility: CLINIC | Age: 82
End: 2017-08-01

## 2017-08-01 NOTE — TELEPHONE ENCOUNTER
Spoke with daughter, Elena.  She states patient needs to have pacemaker checked (last interrogation Oct 2016).  He is scheduled to see Dr. Sanchez at [facility] this Thursday, Aug 3.  Wants to know if device check can be done same day.  Adivsed I would contact [employer] rep to see if he was available that day.  If not then will schedule on Monday, Aug 7th and work around existing pulmonary appointments.  Will call back with details after discussing with device rep.  She verbalized understanding.

## 2017-08-01 NOTE — TELEPHONE ENCOUNTER
----- Message from Rafaela Cash sent at 8/1/2017  9:50 AM CDT -----  Contact: megan.daughter  Would like to speak to nurse about pt. Please call back at [phone]. thanks

## 2017-08-01 NOTE — TELEPHONE ENCOUNTER
----- Message from Jelly Villarreal LPN sent at 8/1/2017 11:28 AM CDT -----  Contact: Elena ( pt daughter )  Would you please help assist with this?      ----- Message -----  From: Eduard Lopez  Sent: 8/1/2017  11:08 AM  To: Laura CLAYTON Staff    Elena ( pt daughter ) is requesting a call from nurse in regards to checking pt pace maker.          Please callIris ( pt daughter ) back  [phone]

## 2017-08-01 NOTE — TELEPHONE ENCOUNTER
Attempted without success to reach patient's daughter, Elena.  Message left on patient's voicemail.

## 2017-08-01 NOTE — TELEPHONE ENCOUNTER
Spoke with Elena, advised that [employer] rep could be at [facility] this Thursday, 10:30 am to do Mr. Russo's pacemaker check.  She repeated back information correctly, all questions answered.

## 2017-08-03 ENCOUNTER — OFFICE VISIT (OUTPATIENT)
Dept: CARDIOLOGY | Facility: CLINIC | Age: 82
End: 2017-08-03
Payer: COMMERCIAL

## 2017-08-03 ENCOUNTER — CLINICAL SUPPORT (OUTPATIENT)
Dept: CARDIOLOGY | Facility: CLINIC | Age: 82
End: 2017-08-03
Payer: COMMERCIAL

## 2017-08-03 VITALS — WEIGHT: 204.25 LBS | BODY MASS INDEX: 28.6 KG/M2 | HEIGHT: 71 IN

## 2017-08-03 DIAGNOSIS — I10 ESSENTIAL HYPERTENSION: ICD-10-CM

## 2017-08-03 DIAGNOSIS — N18.30 CKD (CHRONIC KIDNEY DISEASE) STAGE 3, GFR 30-59 ML/MIN: ICD-10-CM

## 2017-08-03 DIAGNOSIS — R09.02 HYPOXEMIA: ICD-10-CM

## 2017-08-03 DIAGNOSIS — J96.02 ACUTE RESPIRATORY FAILURE WITH HYPOXIA AND HYPERCAPNIA: Primary | ICD-10-CM

## 2017-08-03 DIAGNOSIS — I35.0 NONRHEUMATIC AORTIC VALVE STENOSIS: ICD-10-CM

## 2017-08-03 DIAGNOSIS — I25.118 CORONARY ARTERY DISEASE OF NATIVE ARTERY OF NATIVE HEART WITH STABLE ANGINA PECTORIS: ICD-10-CM

## 2017-08-03 DIAGNOSIS — J96.01 ACUTE RESPIRATORY FAILURE WITH HYPOXIA AND HYPERCAPNIA: Primary | ICD-10-CM

## 2017-08-03 DIAGNOSIS — I42.9 CARDIOMYOPATHY, UNSPECIFIED TYPE: ICD-10-CM

## 2017-08-03 DIAGNOSIS — I50.23 ACUTE ON CHRONIC SYSTOLIC (CONGESTIVE) HEART FAILURE: ICD-10-CM

## 2017-08-03 DIAGNOSIS — E11.9 TYPE 2 DIABETES MELLITUS WITHOUT COMPLICATION, WITHOUT LONG-TERM CURRENT USE OF INSULIN: ICD-10-CM

## 2017-08-03 DIAGNOSIS — D64.9 ANEMIA, UNSPECIFIED TYPE: ICD-10-CM

## 2017-08-03 PROCEDURE — 3008F BODY MASS INDEX DOCD: CPT | Mod: S$GLB,,, | Performed by: INTERNAL MEDICINE

## 2017-08-03 PROCEDURE — 1159F MED LIST DOCD IN RCRD: CPT | Mod: S$GLB,,, | Performed by: INTERNAL MEDICINE

## 2017-08-03 PROCEDURE — 99214 OFFICE O/P EST MOD 30 MIN: CPT | Mod: S$GLB,,, | Performed by: INTERNAL MEDICINE

## 2017-08-03 PROCEDURE — 93280 PM DEVICE PROGR EVAL DUAL: CPT | Mod: 26,,, | Performed by: INTERNAL MEDICINE

## 2017-08-03 PROCEDURE — 1126F AMNT PAIN NOTED NONE PRSNT: CPT | Mod: S$GLB,,, | Performed by: INTERNAL MEDICINE

## 2017-08-03 PROCEDURE — 99999 PR PBB SHADOW E&M-EST. PATIENT-LVL III: CPT | Mod: PBBFAC,,, | Performed by: INTERNAL MEDICINE

## 2017-08-03 RX ORDER — ALPRAZOLAM 0.5 MG/1
0.5 TABLET ORAL NIGHTLY PRN
Qty: 30 TABLET | Refills: 5 | Status: SHIPPED | OUTPATIENT
Start: 2017-08-03 | End: 2018-08-16 | Stop reason: SDUPTHER

## 2017-08-03 NOTE — PROGRESS NOTES
Subjective:   Patient ID:  Rajiv Russo is a 89 y.o. male who presents for follow up of Coronary Artery Disease      HPI  A 89 yo male with h/o cardiomyopathy as s/p pacer  ckd is here for f/u. He has been admitted with chf in 6/28/2017 was in acute chf he was diuresed he is on o2 therapy now at home. He is having paf runs causing his chf also. His diuretics have been adjusted,. He lot a lot of weight.has been nervous not sleeping well at nite he is on xanax.he is unstable he just fell this week in the bathroom. anticoagualtion is a major issue.   Past Medical History:   Diagnosis Date    Acute coronary syndrome     Bladder cancer     Bradycardia     CHF (congestive heart failure)     CKD (chronic kidney disease) stage 3, GFR 30-59 ml/min     Coronary artery disease     Diabetes mellitus     Heart attack     Hyperlipidemia     Hypertension     Macular degeneration     Pacemaker        Past Surgical History:   Procedure Laterality Date    BLADDER SURGERY      CARDIAC PACEMAKER PLACEMENT      CATARACT EXTRACTION      colon removal.          Social History   Substance Use Topics    Smoking status: Former Smoker    Smokeless tobacco: Former User    Alcohol use No       Family History   Problem Relation Age of Onset    No Known Problems Mother     No Known Problems Father     No Known Problems Sister     No Known Problems Brother     No Known Problems Maternal Aunt     No Known Problems Maternal Uncle     No Known Problems Paternal Aunt     No Known Problems Paternal Uncle     No Known Problems Maternal Grandmother     No Known Problems Maternal Grandfather     No Known Problems Paternal Grandmother     No Known Problems Paternal Grandfather     Amblyopia Neg Hx     Blindness Neg Hx     Cancer Neg Hx     Cataracts Neg Hx     Diabetes Neg Hx     Glaucoma Neg Hx     Hypertension Neg Hx     Macular degeneration Neg Hx     Retinal detachment Neg Hx     Strabismus Neg Hx      "Stroke Neg Hx     Thyroid disease Neg Hx        Current Outpatient Prescriptions   Medication Sig    alprazolam (XANAX) 0.5 MG tablet     amiodarone (PACERONE) 200 MG Tab Take 1 tablet (200 mg total) by mouth 2 (two) times daily.    aspirin (ECOTRIN) 81 MG EC tablet Take 81 mg by mouth once daily.    atorvastatin (LIPITOR) 20 MG tablet Take 1 tablet (20 mg total) by mouth every evening.    BD INSULIN PEN NEEDLE UF MINI 31 gauge x 3/16" Ndle     bumetanide (BUMEX) 2 MG tablet Take 1 tablet (2 mg total) by mouth once daily. Take one tablet twice a day for two days then resume normal once daily    cyanocobalamin 1,000 mcg/mL injection     fenofibrate 160 MG Tab Take 160 mg by mouth once daily.    glipiZIDE (GLUCOTROL) 2.5 MG TR24 Take 2.5 mg by mouth 2 (two) times daily with meals.     insulin glargine (LANTUS) 100 unit/mL injection Inject 15 Units into the skin 2 (two) times daily.     metOLazone (ZAROXOLYN) 5 MG tablet Take 1 tablet (5 mg total) by mouth once daily.    nitroGLYCERIN (NITROSTAT) 0.4 MG SL tablet Place 1 tablet (0.4 mg total) under the tongue every 5 (five) minutes as needed for Chest pain.    oxycodone-acetaminophen (PERCOCET) 5-325 mg per tablet Take 1 tablet by mouth every 6 (six) hours as needed.    potassium chloride SA (K-DUR,KLOR-CON) 10 MEQ tablet Take 10 mEq by mouth once.    SPIRIVA WITH HANDIHALER 18 mcg inhalation capsule     tamsulosin (FLOMAX) 0.4 mg Cp24 Take 0.4 mg by mouth.    losartan (COZAAR) 25 MG tablet Take 0.5 tablets (12.5 mg total) by mouth once daily.    metoprolol succinate (TOPROL-XL) 25 MG 24 hr tablet Take 1 tablet (25 mg total) by mouth every evening.     No current facility-administered medications for this visit.      Current Outpatient Prescriptions on File Prior to Visit   Medication Sig    alprazolam (XANAX) 0.5 MG tablet     amiodarone (PACERONE) 200 MG Tab Take 1 tablet (200 mg total) by mouth 2 (two) times daily.    aspirin (ECOTRIN) 81 MG EC " "tablet Take 81 mg by mouth once daily.    atorvastatin (LIPITOR) 20 MG tablet Take 1 tablet (20 mg total) by mouth every evening.    BD INSULIN PEN NEEDLE UF MINI 31 gauge x 3/16" Ndle     bumetanide (BUMEX) 2 MG tablet Take 1 tablet (2 mg total) by mouth once daily. Take one tablet twice a day for two days then resume normal once daily    cyanocobalamin 1,000 mcg/mL injection     fenofibrate 160 MG Tab Take 160 mg by mouth once daily.    glipiZIDE (GLUCOTROL) 2.5 MG TR24 Take 2.5 mg by mouth 2 (two) times daily with meals.     insulin glargine (LANTUS) 100 unit/mL injection Inject 15 Units into the skin 2 (two) times daily.     metOLazone (ZAROXOLYN) 5 MG tablet Take 1 tablet (5 mg total) by mouth once daily.    nitroGLYCERIN (NITROSTAT) 0.4 MG SL tablet Place 1 tablet (0.4 mg total) under the tongue every 5 (five) minutes as needed for Chest pain.    oxycodone-acetaminophen (PERCOCET) 5-325 mg per tablet Take 1 tablet by mouth every 6 (six) hours as needed.    potassium chloride SA (K-DUR,KLOR-CON) 10 MEQ tablet Take 10 mEq by mouth once.    SPIRIVA WITH HANDIHALER 18 mcg inhalation capsule     tamsulosin (FLOMAX) 0.4 mg Cp24 Take 0.4 mg by mouth.    losartan (COZAAR) 25 MG tablet Take 0.5 tablets (12.5 mg total) by mouth once daily.    metoprolol succinate (TOPROL-XL) 25 MG 24 hr tablet Take 1 tablet (25 mg total) by mouth every evening.     No current facility-administered medications on file prior to visit.      Review of patient's allergies indicates:  No Known Allergies  Review of Systems   Constitution: Positive for malaise/fatigue and weight loss. Negative for weakness.   HENT: Negative for headaches.    Eyes: Negative for blurred vision.   Cardiovascular: Positive for dyspnea on exertion and leg swelling. Negative for chest pain, claudication, cyanosis, irregular heartbeat, near-syncope, orthopnea, palpitations and paroxysmal nocturnal dyspnea.   Respiratory: Positive for shortness of " "breath. Negative for cough and hemoptysis.    Hematologic/Lymphatic: Negative for bleeding problem. Does not bruise/bleed easily.   Skin: Negative for dry skin and itching.   Musculoskeletal: Negative for falls, muscle weakness and myalgias.   Gastrointestinal: Negative for abdominal pain, diarrhea, heartburn, hematemesis, hematochezia and melena.   Genitourinary: Negative for flank pain and hematuria.   Neurological: Negative for dizziness, focal weakness, light-headedness, numbness, paresthesias and seizures.   Psychiatric/Behavioral: Positive for memory loss. Negative for altered mental status. The patient has insomnia and is nervous/anxious.    Allergic/Immunologic: Negative for hives.       Objective:   Physical Exam  Vitals:    08/03/17 1103   Weight: 92.7 kg (204 lb 4.1 oz)   Height: 5' 11" (1.803 m)   Ht 5' 11" (1.803 m)   Wt 92.7 kg (204 lb 4.1 oz)   BMI 28.49 kg/m²    bp 120/60 mmhg  Constitutional: He is oriented to person, place, and time. He appears well-developed and well-nourished. No distress.   HENT:   Head: Normocephalic and atraumatic.   Eyes: EOM are normal. Pupils are equal, round, and reactive to light. Right eye exhibits no discharge. Left eye exhibits no discharge.   Neck: Neck supple. No JVD present. No thyromegaly present.   Cardiovascular: Normal rate, irregularily irregular  rhythm and intact distal pulses.  Exam reveals no gallop and no friction rub.    Murmur heard.   Harsh midsystolic murmur is present with a grade of 2/6  at the upper right sternal border radiating to the neck  Pulmonary/Chest: Effort normal and breath sounds normal. No respiratory distress. He has no wheezes. He has no rales. He exhibits no tenderness.   Pacer site well healed.   Abdominal: Soft. Bowel sounds are normal. He exhibits no distension. There is no tenderness.   Musculoskeletal: Normal range of motion. He exhibits edema (trace).   Neurological: He is alert and oriented to person, place, and time. No " cranial nerve deficit.   Skin: Skin is warm and dry. No rash noted. He is not diaphoretic. No erythema.   Psychiatric: He has a normal mood and affect. His behavior is normal.   Nursing note and vitals reviewed.  Lab Results   Component Value Date    CHOL 186 11/02/2011     Lab Results   Component Value Date    HDL 45 11/02/2011     Lab Results   Component Value Date    LDLCALC 111.0 11/02/2011     Lab Results   Component Value Date    TRIG 149 11/02/2011     Lab Results   Component Value Date    CHOLHDL 24.2 11/02/2011       Chemistry        Component Value Date/Time     07/14/2017 1431    K 4.7 07/14/2017 1431    CL 88 (L) 07/14/2017 1431    CO2 37 (H) 07/14/2017 1431    BUN 25 07/14/2017 1431    CREATININE 1.8 (H) 07/14/2017 1431     (H) 07/14/2017 1431        Component Value Date/Time    CALCIUM 9.1 07/14/2017 1431    ALKPHOS 41 (L) 07/14/2017 1431    AST 26 07/14/2017 1431    ALT 12 07/14/2017 1431    BILITOT 0.6 07/14/2017 1431    ESTGFRAFRICA 35.4 (A) 07/14/2017 1431    EGFRNONAA 30.6 (A) 07/14/2017 1431          Lab Results   Component Value Date    TSH 3.57 11/01/2011     Lab Results   Component Value Date    INR 1.2 06/28/2017    INR 1.1 03/14/2017    INR 1.0 04/14/2016     Lab Results   Component Value Date    WBC 7.04 06/29/2017    HGB 12.0 (L) 06/29/2017    HCT 39.4 (L) 06/29/2017    MCV 91 06/29/2017     06/29/2017     BMP  Sodium   Date Value Ref Range Status   07/14/2017 136 136 - 145 mmol/L Final     Potassium   Date Value Ref Range Status   07/14/2017 4.7 3.5 - 5.1 mmol/L Final     Chloride   Date Value Ref Range Status   07/14/2017 88 (L) 95 - 110 mmol/L Final     CO2   Date Value Ref Range Status   07/14/2017 37 (H) 23 - 29 mmol/L Final     BUN, Bld   Date Value Ref Range Status   07/14/2017 25 10 - 30 mg/dL Final     Creatinine   Date Value Ref Range Status   07/14/2017 1.8 (H) 0.5 - 1.4 mg/dL Final     Calcium   Date Value Ref Range Status   07/14/2017 9.1 8.7 - 10.5 mg/dL  Final     Anion Gap   Date Value Ref Range Status   07/14/2017 11 8 - 16 mmol/L Final     eGFR if    Date Value Ref Range Status   07/14/2017 35.4 (A) >60 mL/min/1.73 m^2 Final     eGFR if non    Date Value Ref Range Status   07/14/2017 30.6 (A) >60 mL/min/1.73 m^2 Final     Comment:     Calculation used to obtain the estimated glomerular filtration  rate (eGFR) is the CKD-EPI equation. Since race is unknown   in our information system, the eGFR values for   -American and Non--American patients are given   for each creatinine result.       CrCl cannot be calculated (Patient's most recent sCr result is older than the maximum 7 days allowed.).    Assessment:     1. Acute respiratory failure with hypoxia and hypercapnia    2. Uncontrolled type 2 diabetes mellitus with stage 3 chronic kidney disease, with long-term current use of insulin    3. Hypoxemia    4. Coronary artery disease of native artery of native heart with stable angina pectoris    5. Essential hypertension    6. Anemia, unspecified type    7. CKD (chronic kidney disease) stage 3, GFR 30-59 ml/min    8. Type 2 diabetes mellitus without complication, without long-term current use of insulin    9. Nonrheumatic aortic valve stenosis    10. Cardiomyopathy, unspecified type    11. Acute on chronic systolic (congestive) heart failure      Has chf exacerbation responding to diuretics still needing o2 therapy. He has decided on conservative therapy a while back,he is not anticoagulation material.  Plan:   Continue zaroxolyn 5 mg po twice   Continue bumex   Xanax o.5 mg po hs  F/u in chf clinic in 1 month  Patient is conservative management .

## 2017-08-07 ENCOUNTER — HOSPITAL ENCOUNTER (OUTPATIENT)
Dept: RADIOLOGY | Facility: HOSPITAL | Age: 82
Discharge: HOME | End: 2017-08-07
Attending: INTERNAL MEDICINE
Payer: MEDICARE

## 2017-08-07 ENCOUNTER — PROCEDURE VISIT (OUTPATIENT)
Dept: PULMONOLOGY | Facility: CLINIC | Age: 82
End: 2017-08-07
Payer: MEDICARE

## 2017-08-07 ENCOUNTER — OFFICE VISIT (OUTPATIENT)
Dept: PULMONOLOGY | Facility: CLINIC | Age: 82
End: 2017-08-07
Payer: MEDICARE

## 2017-08-07 ENCOUNTER — PROCEDURE VISIT (OUTPATIENT)
Dept: PULMONOLOGY | Facility: CLINIC | Age: 82
End: 2017-08-07
Payer: COMMERCIAL

## 2017-08-07 VITALS
DIASTOLIC BLOOD PRESSURE: 72 MMHG | HEIGHT: 71 IN | HEART RATE: 84 BPM | SYSTOLIC BLOOD PRESSURE: 114 MMHG | OXYGEN SATURATION: 97 % | BODY MASS INDEX: 25.9 KG/M2 | WEIGHT: 185 LBS | RESPIRATION RATE: 18 BRPM

## 2017-08-07 VITALS — HEIGHT: 71 IN | BODY MASS INDEX: 25.9 KG/M2 | WEIGHT: 185 LBS

## 2017-08-07 DIAGNOSIS — R06.89 DYSPNEA AND RESPIRATORY ABNORMALITIES: Chronic | ICD-10-CM

## 2017-08-07 DIAGNOSIS — J96.11 CHRONIC RESPIRATORY FAILURE WITH HYPOXIA AND HYPERCAPNIA: Primary | Chronic | ICD-10-CM

## 2017-08-07 DIAGNOSIS — R06.00 DYSPNEA AND RESPIRATORY ABNORMALITIES: Chronic | ICD-10-CM

## 2017-08-07 DIAGNOSIS — I50.22 CHRONIC SYSTOLIC CHF (CONGESTIVE HEART FAILURE), NYHA CLASS 3: Chronic | ICD-10-CM

## 2017-08-07 DIAGNOSIS — J44.9 MIXED TYPE COPD (CHRONIC OBSTRUCTIVE PULMONARY DISEASE): Chronic | ICD-10-CM

## 2017-08-07 DIAGNOSIS — J96.12 CHRONIC RESPIRATORY FAILURE WITH HYPOXIA AND HYPERCAPNIA: Primary | Chronic | ICD-10-CM

## 2017-08-07 PROBLEM — J96.01 ACUTE RESPIRATORY FAILURE WITH HYPOXIA AND HYPERCAPNIA: Status: RESOLVED | Noted: 2017-06-28 | Resolved: 2017-08-07

## 2017-08-07 PROBLEM — J96.02 ACUTE RESPIRATORY FAILURE WITH HYPOXIA AND HYPERCAPNIA: Status: RESOLVED | Noted: 2017-06-28 | Resolved: 2017-08-07

## 2017-08-07 LAB
ALLENS TEST: ABNORMAL
DELSYS: ABNORMAL
FIO2: 21
HCO3 UR-SCNC: 44.5 MMOL/L (ref 24–28)
MODE: ABNORMAL
PCO2 BLDA: 61.6 MMHG (ref 35–45)
PH SMN: 7.47 [PH] (ref 7.35–7.45)
PO2 BLDA: 49 MMHG (ref 80–100)
POC BE: 21 MMOL/L
POC SATURATED O2: 85 % (ref 95–100)
POST FEF 25 75: 0.4 L/S (ref 0.34–1.98)
POST FET 100: 11.54 S
POST FEV1 FVC: 54 %
POST FEV1: 1.11 L (ref 1.79–2.6)
POST FIF 50: 1.89 L/S
POST FVC: 2.05 L (ref 2.85–3.8)
POST PEF: 2.39 L/S (ref 3.56–5.94)
PRE DLCO: 13.3 ML/MMHG/MIN (ref 10.9–19.19)
PRE FEF 25 75: 0.47 L/S (ref 0.34–1.98)
PRE FET 100: 10.77 S
PRE FEV1 FVC: 58 %
PRE FEV1: 1.11 L (ref 1.79–2.6)
PRE FIF 50: 1.84 L/S
PRE FRC PL: 20.64 L (ref 3.26–4.47)
PRE FVC: 1.9 L (ref 2.85–3.8)
PRE KROGHS K: 3.25 1/MIN
PRE PEF: 3.35 L/S (ref 3.56–5.94)
PRE SVC: 1.9 L
PREDICTED DLCO: 15.05 ML/MMHG/MIN (ref 10.9–19.19)
PREDICTED FEV1 FVC: 67.82 % (ref 62.98–72.66)
PREDICTED FEV1: 2.19 L (ref 1.79–2.6)
PREDICTED FRC N2: 3.87 L (ref 3.26–4.47)
PREDICTED FRC PL: 3.87 L (ref 3.26–4.47)
PREDICTED FVC: 3.33 L (ref 2.85–3.8)
PREDICTED RV: 3.09 L (ref 2.69–3.48)
PREDICTED SVC: 3.49 L
PREDICTED TLC: 6.33 L (ref 5.83–6.83)
PROVOCATION PROTOCOL: ABNORMAL
SAMPLE: ABNORMAL
SITE: ABNORMAL

## 2017-08-07 PROCEDURE — 3008F BODY MASS INDEX DOCD: CPT | Mod: S$GLB,,, | Performed by: INTERNAL MEDICINE

## 2017-08-07 PROCEDURE — 71020 XR CHEST PA AND LATERAL: CPT | Mod: TC

## 2017-08-07 PROCEDURE — 82803 BLOOD GASES ANY COMBINATION: CPT | Mod: S$GLB,,, | Performed by: INTERNAL MEDICINE

## 2017-08-07 PROCEDURE — 94729 DIFFUSING CAPACITY: CPT | Mod: S$GLB,,, | Performed by: INTERNAL MEDICINE

## 2017-08-07 PROCEDURE — 1159F MED LIST DOCD IN RCRD: CPT | Mod: S$GLB,,, | Performed by: INTERNAL MEDICINE

## 2017-08-07 PROCEDURE — 94620 PR PULMONARY STRESS TESTING,SIMPLE: CPT | Mod: S$GLB,,, | Performed by: INTERNAL MEDICINE

## 2017-08-07 PROCEDURE — 71020 XR CHEST PA AND LATERAL: CPT | Mod: 26,,, | Performed by: RADIOLOGY

## 2017-08-07 PROCEDURE — 94726 PLETHYSMOGRAPHY LUNG VOLUMES: CPT | Mod: S$GLB,,, | Performed by: INTERNAL MEDICINE

## 2017-08-07 PROCEDURE — 99499 UNLISTED E&M SERVICE: CPT | Mod: S$GLB,,, | Performed by: INTERNAL MEDICINE

## 2017-08-07 PROCEDURE — 99999 PR PBB SHADOW E&M-EST. PATIENT-LVL III: CPT | Mod: PBBFAC,,, | Performed by: INTERNAL MEDICINE

## 2017-08-07 PROCEDURE — 99215 OFFICE O/P EST HI 40 MIN: CPT | Mod: 25,S$GLB,, | Performed by: INTERNAL MEDICINE

## 2017-08-07 PROCEDURE — 94060 EVALUATION OF WHEEZING: CPT | Mod: S$GLB,,, | Performed by: INTERNAL MEDICINE

## 2017-08-07 RX ORDER — TIOTROPIUM BROMIDE 18 UG/1
18 CAPSULE ORAL; RESPIRATORY (INHALATION) DAILY
Qty: 30 CAPSULE | Refills: 11 | Status: SHIPPED | OUTPATIENT
Start: 2017-08-07

## 2017-08-07 NOTE — ASSESSMENT & PLAN NOTE
-     Optimize CHF regimen.  -      Recommend referral to CHF clinic.  -      Preload and afterload reduction.  -      Daily weighing.  -      Dietary salt restriction.  -      Alcohol and tobacco avoidance.

## 2017-08-07 NOTE — PATIENT INSTRUCTIONS
Lung Anatomy  Your lungs take air in to give your body oxygen, which the body needs to work. Your lungs, like all the tissues in your body, are made up of billions of tiny specialized cells. Old lung cells die and are replaced by new, identical lung cells. This natural process helps ensure healthy lungs.    Date Last Reviewed: 11/1/2016  © 7233-6634 [employer] [address] [place] [address]. All rights reserved. This information is not intended as a substitute for professional medical care. Always follow your healthcare professional's instructions.

## 2017-08-07 NOTE — PROGRESS NOTES
Subjective:      Established patient    Patient ID: Rajiv Russo is a 89 y.o. male.  Patient Active Problem List   Diagnosis    Chronic respiratory failure with hypoxia and hypercapnia    Mixed type COPD (chronic obstructive pulmonary disease)    CAD (coronary artery disease)    DM type 2, uncontrolled, with renal complications    HTN (hypertension)    Anemia    CKD (chronic kidney disease) stage 3, GFR 30-59 ml/min    Troponin I above reference range    Nonexudative senile macular degeneration of retina    Type 2 diabetes mellitus without complication    Ischemic chest pain    Angina of effort    Nonrheumatic aortic valve stenosis    Cardiomyopathy    Unstable angina    Chronic systolic CHF (congestive heart failure), NYHA class 3    Cellulitis of leg    Dyspnea and respiratory abnormalities       Problem list has been reviewed.    Chief Complaint: Shortness of Breath      HPI    PFT, 6 MWT and ABG reviewed with patient who expressed and voiced understanding. All questions were answered to the patients satisfaction. He states that he has improved  is fine and has no new onset pulmonary complaints. He denies cough sputum, hemoptysis,  pain with breathing, wheezing, asthma.   A full  review of systems, past , family  and social histories was performed except as mentioned in the note above, these are non contributory to the main issues discussed  today      Previous Report Reviewed: lab reports, office notes and radiology reports     The following portions of the patient's history were reviewed and updated as appropriate: He  has a past medical history of Acute coronary syndrome; Bladder cancer; Bradycardia; CHF (congestive heart failure); CKD (chronic kidney disease) stage 3, GFR 30-59 ml/min; Coronary artery disease; Diabetes mellitus; Heart attack; Hyperlipidemia; Hypertension; Macular degeneration; and Pacemaker.  He  has a past surgical history that includes colon removal. ; Cardiac  "pacemaker placement; Bladder surgery; and Cataract extraction.  His family history includes No Known Problems in his brother, father, maternal aunt, maternal grandfather, maternal grandmother, maternal uncle, mother, paternal aunt, paternal grandfather, paternal grandmother, paternal uncle, and sister.  He  reports that he has quit smoking. He has quit using smokeless tobacco. He reports that he does not drink alcohol or use drugs.  He has a current medication list which includes the following prescription(s): alprazolam, amiodarone, aspirin, atorvastatin, bd insulin pen needle uf mini, bumetanide, cyanocobalamin, fenofibrate, glipizide, insulin glargine, metolazone, nitroglycerin, oxycodone-acetaminophen, potassium chloride sa, spiriva with handihaler, tamsulosin, losartan, and metoprolol succinate.  He has No Known Allergies..    Review of Systems   Constitutional: Negative for fever, fatigue and night sweats.   Respiratory: Positive for snoring, cough, orthopnea, somnolence and Paroxysmal Nocturnal Dyspnea. Negative for apnea, choking and chest tightness.    Cardiovascular: Negative for chest pain and leg swelling.   Genitourinary: Negative for difficulty urinating.   Gastrointestinal: Positive for nausea.   Neurological: Negative for dizziness and headaches.        Objective:     /72   Pulse 84   Resp 18   Ht 5' 11" (1.803 m)   Wt 83.9 kg (185 lb)   SpO2 97% Comment: 4L  BMI 25.80 kg/m²   Body mass index is 25.8 kg/m².     Physical Exam   Constitutional: He is oriented to person, place, and time. He appears well-developed and well-nourished.   Eyes: EOM are normal. Pupils are equal, round, and reactive to light.   Neck: Normal range of motion. Neck supple.   Cardiovascular: Normal rate and regular rhythm.    Pulmonary/Chest: Effort normal. He has decreased breath sounds in the right lower field and the left lower field. He has rales.   Abdominal: Soft. Bowel sounds are normal.   Musculoskeletal: " Normal range of motion. He exhibits edema (3+).   Neurological: He is alert and oriented to person, place, and time.   Skin: Skin is warm and dry. Capillary refill takes less than 2 seconds. There is erythema.   Stasis dermatitis bilateral LEs   Psychiatric: He has a normal mood and affect.   Nursing note and vitals reviewed.      Personal Diagnostic Review    ABG:  Component      Latest Ref Rng & Units 2017             POC PH      7.35 - 7.45 7.467 (H)   POC PCO2      35 - 45 mmHg 61.6 (HH)   POC PO2      80 - 100 mmHg 49 (LL)   POC HCO3      24 - 28 mmol/L 44.5 (H)   POC BE      -2 to 2 mmol/L 21   POC SATURATED O2      95 - 100 % 85 (L)   Sample       ARTERIAL   Site       RR   Allens Test       Pass   DelSys       Room Air   Mode      Oxygen  SPONT   FiO2       21     Severe hypoxemia with elevated A - a gradient   Primary metabolic alkalosis,  with superimposed respiratory acidosis    (expected Pco2 = 34 - 38)    expected pH = 7.16  expected CO2 = 30  expected HCO3- = 43      Pulmonary function tests: FEV1: 1.11  (51 % predicted), FVC:  1.90 (57 % predicted), FEV1/FVC:  58, T.90 (55 % predicted),  DLCO: 13.3 (88 % predicted) Moderately severe mixed obstruction with restriction. Diffusion capacity is normal.     Six Minute Walk Test: Six minute walk distance is 83.32 / 416.22 meters (20.14 % predicted) with moderate dyspnea. During exercise, there was significant desaturation while breathing room air to 87%. Oxygen saturation improved to 91% with oxygen supplementation at 3  L /min. Both blood pressure and heart rate remained stable with walking. The patient reported non-pulmonary symptoms during exercise - leg weakness. The patient did complete the study, walking 231 seconds of the 360 second test.  The patient may benefit from using supplemental oxygen during exertion. No previous study performed.  Based upon age and body mass index, exercise capacity is less than predicted.    Peak VO2 during walking  was 6.46 ml/min/kg[1.85METS]    Chest x-ray: Bibasilar infiltrates and effusions, greater on the RIGHT.  Cannot exclude coalescent airspace disease and/or partial atelectasis, particularly within the RIGHT base.    Additional findings as above.         Lab Review   Lab Results   Component Value Date     07/14/2017    K 4.7 07/14/2017    CL 88 (L) 07/14/2017    CO2 37 (H) 07/14/2017    BUN 25 07/14/2017    CREATININE 1.8 (H) 07/14/2017    CALCIUM 9.1 07/14/2017     Lab Results   Component Value Date    CKMB 1.3 11/02/2011    TROPONINI 0.049 (H) 06/28/2017    TROPONINI <0.04 11/02/2011     Lab Results   Component Value Date    WBC 7.04 06/29/2017    HGB 12.0 (L) 06/29/2017    HCT 39.4 (L) 06/29/2017    MCV 91 06/29/2017     06/29/2017          RANJIT Index for COPD Survival Prediction   Select Criteria:   FEV1 % Predicted After Bronchodialator     [] >= 65% (0 points)    [x] 50-64% (1 point)    [] 36-49% (2 points)    [] <= 35% (3 points)   6 Minute Walk Distance     [] >= 350 Meters (0 points)    [] 250-349 Meters (1 point)    [] 150-249 Meters (2 points)    [x] <= 149 Meters (3 points)   MMRC Dyspnea Scale (4 is worst)     [] MMRC 0: Dyspneic on strenuous excercise (0 points)    [] MMRC 1: Dyspneic on walking a slight hill (0 points)    [] MMRC 2: Dyspneic on walking level ground; must stop occasionally due to breathlessness (1 point)    [x] MMRC 3: Must stop for breathlessness after walking 100 yards or after a few minutes (2 points)    [] MMRC 4: Cannot leave house; breathless on dressing/undressing (3 points)   Body Mass Index     [x] > 21 (0 points)    [] <= 21 (1 point)   Results: Total Criteria Point Count:     Approximate 4 Year Survival Interpretation   0-2 Points:  [] 80%   3-4 Points:  [] 67%   5-6 Points:  [x] 57%   7-10 Points:[] 18%         References  1. Temitope BR, Gabrielle CG, Sukh WHITEHEAD, et. al. The body-mass index, airflow obstruction, dyspnea and exercise capacity index in chronic obstructive  "pulmonary disease. N Engl J Med. 2004 Mar 4;350(10):1005-12.  2. Manas DA, Wells CK. Evaluation of clinical methods for rating dyspnea. Chest. 1988 Mar;93(3):580-6    Assessment:     1. Chronic respiratory failure with hypoxia and hypercapnia Active   2. Mixed type COPD (chronic obstructive pulmonary disease) Active   3. Chronic systolic CHF (congestive heart failure), NYHA class 3 Active     Outpatient Encounter Prescriptions as of 8/7/2017   Medication Sig Dispense Refill    alprazolam (XANAX) 0.5 MG tablet Take 1 tablet (0.5 mg total) by mouth nightly as needed for Anxiety. 30 tablet 5    amiodarone (PACERONE) 200 MG Tab Take 1 tablet (200 mg total) by mouth 2 (two) times daily. 60 tablet 11    aspirin (ECOTRIN) 81 MG EC tablet Take 81 mg by mouth once daily.      atorvastatin (LIPITOR) 20 MG tablet Take 1 tablet (20 mg total) by mouth every evening. 30 tablet 6    BD INSULIN PEN NEEDLE UF MINI 31 gauge x 3/16" Ndle       bumetanide (BUMEX) 2 MG tablet Take 1 tablet (2 mg total) by mouth once daily. Take one tablet twice a day for two days then resume normal once daily 60 tablet 6    cyanocobalamin 1,000 mcg/mL injection       fenofibrate 160 MG Tab Take 160 mg by mouth once daily.      glipiZIDE (GLUCOTROL) 2.5 MG TR24 Take 2.5 mg by mouth 2 (two) times daily with meals.       insulin glargine (LANTUS) 100 unit/mL injection Inject 15 Units into the skin 2 (two) times daily.       metOLazone (ZAROXOLYN) 5 MG tablet Take 1 tablet (5 mg total) by mouth once daily. 30 tablet 11    nitroGLYCERIN (NITROSTAT) 0.4 MG SL tablet Place 1 tablet (0.4 mg total) under the tongue every 5 (five) minutes as needed for Chest pain. 30 tablet 6    oxycodone-acetaminophen (PERCOCET) 5-325 mg per tablet Take 1 tablet by mouth every 6 (six) hours as needed. 12 tablet 0    potassium chloride SA (K-DUR,KLOR-CON) 10 MEQ tablet Take 10 mEq by mouth once.      SPIRIVA WITH HANDIHALER 18 mcg inhalation capsule Inhale 1 " capsule (18 mcg total) into the lungs once daily. 30 capsule 11    tamsulosin (FLOMAX) 0.4 mg Cp24 Take 0.4 mg by mouth.      [DISCONTINUED] SPIRIVA WITH HANDIHALER 18 mcg inhalation capsule       losartan (COZAAR) 25 MG tablet Take 0.5 tablets (12.5 mg total) by mouth once daily. 90 tablet 3    metoprolol succinate (TOPROL-XL) 25 MG 24 hr tablet Take 1 tablet (25 mg total) by mouth every evening. 30 tablet 0    [DISCONTINUED] alprazolam (XANAX) 0.5 MG tablet        No facility-administered encounter medications on file as of 8/7/2017.      No orders of the defined types were placed in this encounter.    Plan:     Discussed diagnosis, its evaluation, treatment and usual course. All questions answered.    Chronic respiratory failure with hypoxia and hypercapnia  Oxygen supplementation at 3 L /min  Overnight oximetry on room air.     Mixed type COPD (chronic obstructive pulmonary disease)  COPD ROS: taking medications as instructed, no medication side effects noted, no significant ongoing wheezing or shortness of breath, using bronchodilator MDI less than twice a week.   New concerns: Oxygen supplementation.   Exam: appears well, vitals normal, no respiratory distress, acyanotic, normal RR, chest clear, no wheezing, crepitations, rhonchi, normal symmetric air entry.   Assessment:  COPD stable.   Plan: orders as documented in EMR.    Chronic systolic CHF (congestive heart failure), NYHA class 3  -     Optimize CHF regimen.  -      Recommend referral to CHF clinic.  -      Preload and afterload reduction.  -      Daily weighing.  -      Dietary salt restriction.  -      Alcohol and tobacco avoidance.            TIME SPENT WITH PATIENT: Time spent: 40 minutes in face to face  discussion concerning diagnosis, prognosis, review of lab and test results, benefits of treatment as well as management of disease, counseling of patient and coordination of care between various health  care providers . Greater than half the  time spent was used for coordination of care and counseling of patient.            Return in about 1 month (around 9/7/2017) for Mixed obstructive and restrictive lung disease, Chronic Respiratory Failure, CHF.

## 2017-08-07 NOTE — ASSESSMENT & PLAN NOTE
COPD ROS: taking medications as instructed, no medication side effects noted, no significant ongoing wheezing or shortness of breath, using bronchodilator MDI less than twice a week.   New concerns: Oxygen supplementation.   Exam: appears well, vitals normal, no respiratory distress, acyanotic, normal RR, chest clear, no wheezing, crepitations, rhonchi, normal symmetric air entry.   Assessment:  COPD stable.   Plan: orders as documented in EMR.

## 2017-08-07 NOTE — PROCEDURES
"[facility]  Six Minute Walk     SUMMARY     Ordering Provider: Dr. Gonsalves   Interpreting Provider: Dr. Gonsalves   Performing nurse/tech/RT: mai  Diagnosis:  (Dyspena and Respiratory Abnormalities)  Height: 5' 11" (180.3 cm)  Weight: 83.9 kg (185 lb)  BMI (Calculated): 25.9   Patient Race:             Phase Oxygen Assessment Supplemental O2 Heart   Rate Blood Pressure [place] Dyspnea Scale Rating   Resting 90 % Room Air 82 bpm 111/56 3   Exercise        Minute        1 87 % Room Air 82 bpm     2 89 % 2 L/M 86 bpm     3 89 % 2 L/M 86 bpm     4 92 % 2 L/M 75 bpm     5 95 % 2 L/M 75 bpm     6  91 % 3 L/M 80 bpm 108/63 4   Recovery        Minute        1 95 % 3 L/M 90 bpm     2 97 % 3 L/M 86 bpm     3 98 % 3 L/M 85 bpm     4 97 % 3 L/M 84 bpm 114/72 3     Six Minute Walk Summary  6MWT Status: not completed  Patient Reported: Dyspnea (Weakness in legs.)     Interpretation:  Did the patient stop or pause?: Yes  How many times did the patient stop or pause?: 1  Stop Time 1: 129  Restart Time 1: 258  Pause Time 1: 129 seconds                             Total Time Walked (Calculated): 231 seconds  Final Partial Lap Distance (feet): 75 feet  Total Distance Meters (Calculated): 83.82 meters  Predicted Distance Meters (Calculated): 416.22 meters  Percentage of Predicted (Calculated): 20.14  Peak VO2 (Calculated): 6.49  Mets: 1.85  Has The Patient Had a Previous Six Minute Walk Test?: No       Previous 6MWT Results  Has The Patient Had a Previous Six Minute Walk Test?: No      PHYSICIAN INTERPRETATION:    Six minute walk distance is 83.32 / 416.22 meters (20.14 % predicted) with moderate dyspnea. During exercise, there was significant desaturation while breathing room air to 87%. Oxygen saturation improved to 91% with oxygen supplementation at 3  L /min. Both blood pressure and heart rate remained stable with walking. The patient reported non-pulmonary symptoms during exercise - leg weakness. The " patient did complete the study, walking 231 seconds of the 360 second test.  The patient may benefit from using supplemental oxygen during exertion. No previous study performed.  Based upon age and body mass index, exercise capacity is less than predicted.    Peak VO2 during walking was 6.46 ml/min/kg[1.85METS]

## 2017-09-06 ENCOUNTER — OFFICE VISIT (OUTPATIENT)
Dept: CARDIOLOGY | Facility: CLINIC | Age: 82
End: 2017-09-06
Payer: MEDICARE

## 2017-09-06 VITALS
BODY MASS INDEX: 28.7 KG/M2 | HEIGHT: 71 IN | DIASTOLIC BLOOD PRESSURE: 50 MMHG | WEIGHT: 205 LBS | SYSTOLIC BLOOD PRESSURE: 100 MMHG | HEART RATE: 73 BPM

## 2017-09-06 DIAGNOSIS — N18.30 CKD (CHRONIC KIDNEY DISEASE) STAGE 3, GFR 30-59 ML/MIN: ICD-10-CM

## 2017-09-06 DIAGNOSIS — I50.22 CHRONIC SYSTOLIC CHF (CONGESTIVE HEART FAILURE), NYHA CLASS 3: Primary | Chronic | ICD-10-CM

## 2017-09-06 DIAGNOSIS — I10 ESSENTIAL HYPERTENSION: ICD-10-CM

## 2017-09-06 DIAGNOSIS — I35.0 NONRHEUMATIC AORTIC VALVE STENOSIS: ICD-10-CM

## 2017-09-06 DIAGNOSIS — I25.118 CORONARY ARTERY DISEASE OF NATIVE ARTERY OF NATIVE HEART WITH STABLE ANGINA PECTORIS: ICD-10-CM

## 2017-09-06 DIAGNOSIS — I50.23 ACUTE ON CHRONIC SYSTOLIC (CONGESTIVE) HEART FAILURE: ICD-10-CM

## 2017-09-06 DIAGNOSIS — I50.22 CHRONIC SYSTOLIC CHF (CONGESTIVE HEART FAILURE): ICD-10-CM

## 2017-09-06 PROCEDURE — 1126F AMNT PAIN NOTED NONE PRSNT: CPT | Mod: S$GLB,,, | Performed by: NURSE PRACTITIONER

## 2017-09-06 PROCEDURE — 3008F BODY MASS INDEX DOCD: CPT | Mod: S$GLB,,, | Performed by: NURSE PRACTITIONER

## 2017-09-06 PROCEDURE — 99499 UNLISTED E&M SERVICE: CPT | Mod: S$GLB,,, | Performed by: NURSE PRACTITIONER

## 2017-09-06 PROCEDURE — 1159F MED LIST DOCD IN RCRD: CPT | Mod: S$GLB,,, | Performed by: NURSE PRACTITIONER

## 2017-09-06 PROCEDURE — 99999 PR PBB SHADOW E&M-EST. PATIENT-LVL III: CPT | Mod: PBBFAC,,, | Performed by: NURSE PRACTITIONER

## 2017-09-06 PROCEDURE — 99214 OFFICE O/P EST MOD 30 MIN: CPT | Mod: S$GLB,,, | Performed by: NURSE PRACTITIONER

## 2017-09-06 RX ORDER — METOLAZONE 5 MG/1
5 TABLET ORAL DAILY PRN
Qty: 30 TABLET | Refills: 11
Start: 2017-09-06 | End: 2018-08-21 | Stop reason: SDUPTHER

## 2017-09-06 RX ORDER — BUMETANIDE 2 MG/1
2 TABLET ORAL DAILY
Qty: 120 TABLET | Refills: 3 | Status: SHIPPED | OUTPATIENT
Start: 2017-09-06 | End: 2018-03-21 | Stop reason: SDUPTHER

## 2017-09-06 NOTE — PROGRESS NOTES
Subjective:   Patient ID:  Rajiv Russo is a 89 y.o. male who presents for follow up of No chief complaint on file.      HPI    Patient presents to clinic for follow up and management of cardiomyopathy. He has PPM, h/o CKD, DM, CAD, old MI, HLP and HTN. Patient admitted in June 2017 with decompensated HF. Noted to have EF of 40% on echo last year. He was diuresed and discharged home with oxygen. He has PAF that was thought to contribute to his CHF. He saw Dr. Sanchez last month and given recommendations to continue Metolazone 5mg BID Bumex 2mg daily. Patient has been 2mg daily and metolazone PRN. He states that he has been doing well since last visit. He denies any chest pain, SOB, orthopnea, PND, dizziness, near syncope or syncope . He has issues with falling, so OAC has been an issue.       Past Medical History:   Diagnosis Date    Acute coronary syndrome     Bladder cancer     Bradycardia     CHF (congestive heart failure)     CKD (chronic kidney disease) stage 3, GFR 30-59 ml/min     Coronary artery disease     Diabetes mellitus     Heart attack     Hyperlipidemia     Hypertension     Macular degeneration     Pacemaker        Past Surgical History:   Procedure Laterality Date    BLADDER SURGERY      CARDIAC PACEMAKER PLACEMENT      CATARACT EXTRACTION      colon removal.          Social History   Substance Use Topics    Smoking status: Former Smoker    Smokeless tobacco: Former User    Alcohol use No       Family History   Problem Relation Age of Onset    No Known Problems Mother     No Known Problems Father     No Known Problems Sister     No Known Problems Brother     No Known Problems Maternal Aunt     No Known Problems Maternal Uncle     No Known Problems Paternal Aunt     No Known Problems Paternal Uncle     No Known Problems Maternal Grandmother     No Known Problems Maternal Grandfather     No Known Problems Paternal Grandmother     No Known Problems Paternal Grandfather   "   Amblyopia Neg Hx     Blindness Neg Hx     Cancer Neg Hx     Cataracts Neg Hx     Diabetes Neg Hx     Glaucoma Neg Hx     Hypertension Neg Hx     Macular degeneration Neg Hx     Retinal detachment Neg Hx     Strabismus Neg Hx     Stroke Neg Hx     Thyroid disease Neg Hx        Current Outpatient Prescriptions   Medication Sig    alprazolam (XANAX) 0.5 MG tablet Take 1 tablet (0.5 mg total) by mouth nightly as needed for Anxiety.    amiodarone (PACERONE) 200 MG Tab Take 1 tablet (200 mg total) by mouth 2 (two) times daily.    aspirin (ECOTRIN) 81 MG EC tablet Take 81 mg by mouth once daily.    atorvastatin (LIPITOR) 20 MG tablet Take 1 tablet (20 mg total) by mouth every evening.    BD INSULIN PEN NEEDLE UF MINI 31 gauge x 3/16" Ndle     bumetanide (BUMEX) 2 MG tablet Take 1 tablet (2 mg total) by mouth once daily. Take one tablet twice a day for two days then resume normal once daily    cyanocobalamin 1,000 mcg/mL injection     fenofibrate 160 MG Tab Take 160 mg by mouth once daily.    glipiZIDE (GLUCOTROL) 2.5 MG TR24 Take 2.5 mg by mouth 2 (two) times daily with meals.     insulin glargine (LANTUS) 100 unit/mL injection Inject 15 Units into the skin 2 (two) times daily.     metOLazone (ZAROXOLYN) 5 MG tablet Take 1 tablet (5 mg total) by mouth daily as needed.    nitroGLYCERIN (NITROSTAT) 0.4 MG SL tablet Place 1 tablet (0.4 mg total) under the tongue every 5 (five) minutes as needed for Chest pain.    oxycodone-acetaminophen (PERCOCET) 5-325 mg per tablet Take 1 tablet by mouth every 6 (six) hours as needed.    potassium chloride SA (K-DUR,KLOR-CON) 10 MEQ tablet Take 10 mEq by mouth once.    SPIRIVA WITH HANDIHALER 18 mcg inhalation capsule Inhale 1 capsule (18 mcg total) into the lungs once daily.    tamsulosin (FLOMAX) 0.4 mg Cp24 Take 0.4 mg by mouth.    losartan (COZAAR) 25 MG tablet Take 0.5 tablets (12.5 mg total) by mouth once daily.    metoprolol succinate (TOPROL-XL) 25 " "MG 24 hr tablet Take 1 tablet (25 mg total) by mouth every evening.     No current facility-administered medications for this visit.      Current Outpatient Prescriptions on File Prior to Visit   Medication Sig    alprazolam (XANAX) 0.5 MG tablet Take 1 tablet (0.5 mg total) by mouth nightly as needed for Anxiety.    amiodarone (PACERONE) 200 MG Tab Take 1 tablet (200 mg total) by mouth 2 (two) times daily.    aspirin (ECOTRIN) 81 MG EC tablet Take 81 mg by mouth once daily.    atorvastatin (LIPITOR) 20 MG tablet Take 1 tablet (20 mg total) by mouth every evening.    BD INSULIN PEN NEEDLE UF MINI 31 gauge x 3/16" Ndle     bumetanide (BUMEX) 2 MG tablet Take 1 tablet (2 mg total) by mouth once daily. Take one tablet twice a day for two days then resume normal once daily    cyanocobalamin 1,000 mcg/mL injection     fenofibrate 160 MG Tab Take 160 mg by mouth once daily.    glipiZIDE (GLUCOTROL) 2.5 MG TR24 Take 2.5 mg by mouth 2 (two) times daily with meals.     insulin glargine (LANTUS) 100 unit/mL injection Inject 15 Units into the skin 2 (two) times daily.     nitroGLYCERIN (NITROSTAT) 0.4 MG SL tablet Place 1 tablet (0.4 mg total) under the tongue every 5 (five) minutes as needed for Chest pain.    oxycodone-acetaminophen (PERCOCET) 5-325 mg per tablet Take 1 tablet by mouth every 6 (six) hours as needed.    potassium chloride SA (K-DUR,KLOR-CON) 10 MEQ tablet Take 10 mEq by mouth once.    SPIRIVA WITH HANDIHALER 18 mcg inhalation capsule Inhale 1 capsule (18 mcg total) into the lungs once daily.    tamsulosin (FLOMAX) 0.4 mg Cp24 Take 0.4 mg by mouth.    [DISCONTINUED] metOLazone (ZAROXOLYN) 5 MG tablet Take 1 tablet (5 mg total) by mouth once daily.    losartan (COZAAR) 25 MG tablet Take 0.5 tablets (12.5 mg total) by mouth once daily.    metoprolol succinate (TOPROL-XL) 25 MG 24 hr tablet Take 1 tablet (25 mg total) by mouth every evening.     No current facility-administered medications on " file prior to visit.        Review of Systems   Constitution: Negative for decreased appetite, weakness, malaise/fatigue, weight gain and weight loss.   HENT: Negative for nosebleeds.    Cardiovascular: Negative for chest pain, claudication, dyspnea on exertion, irregular heartbeat, leg swelling, near-syncope, orthopnea, palpitations, paroxysmal nocturnal dyspnea and syncope.   Respiratory: Negative for cough, shortness of breath, sleep disturbances due to breathing, snoring and wheezing.         Uses oxygen PRN   Hematologic/Lymphatic: Negative for bleeding problem. Does not bruise/bleed easily.   Skin: Negative for rash.   Musculoskeletal: Negative for arthritis, back pain, falls, joint pain, joint swelling, muscle cramps, muscle weakness and myalgias.   Gastrointestinal: Negative for bloating, abdominal pain, constipation, diarrhea, heartburn, nausea and vomiting.   Genitourinary: Negative for dysuria, hematuria and nocturia.   Neurological: Negative for excessive daytime sleepiness, dizziness, light-headedness, loss of balance, numbness, paresthesias and vertigo.   Psychiatric/Behavioral: Positive for memory loss.       Objective:   Physical Exam   Constitutional: He is oriented to person, place, and time. He appears well-developed and well-nourished.   Neck: Neck supple. No JVD present.   Cardiovascular: Normal rate, regular rhythm and normal pulses.  Exam reveals no friction rub.    Murmur heard.   Harsh midsystolic murmur is present  at the upper right sternal border radiating to the neck  Pulmonary/Chest: Effort normal and breath sounds normal. No respiratory distress. He has no wheezes. He has no rales.   PPM pocket WNL    Abdominal: Soft. Bowel sounds are normal. He exhibits no distension.   Musculoskeletal: He exhibits edema (trace pedal edema bilaterally ). He exhibits no tenderness.   Neurological: He is alert and oriented to person, place, and time.   Skin: Skin is warm and dry. No rash noted.  "  Psychiatric: He has a normal mood and affect. His behavior is normal.   Nursing note and vitals reviewed.         Vitals:    09/06/17 1407   BP: (!) 100/50   BP Location: Left arm   Patient Position: Sitting   BP Method: Medium (Manual)   Pulse: 73   Weight: 93 kg (205 lb 0.4 oz)   Height: 5' 11" (1.803 m)     Lab Results   Component Value Date    CHOL 186 11/02/2011     Lab Results   Component Value Date    HDL 45 11/02/2011     Lab Results   Component Value Date    LDLCALC 111.0 11/02/2011     Lab Results   Component Value Date    TRIG 149 11/02/2011     Lab Results   Component Value Date    CHOLHDL 24.2 11/02/2011       Chemistry        Component Value Date/Time     07/14/2017 1431    K 4.7 07/14/2017 1431    CL 88 (L) 07/14/2017 1431    CO2 37 (H) 07/14/2017 1431    BUN 25 07/14/2017 1431    CREATININE 1.8 (H) 07/14/2017 1431     (H) 07/14/2017 1431        Component Value Date/Time    CALCIUM 9.1 07/14/2017 1431    ALKPHOS 41 (L) 07/14/2017 1431    AST 26 07/14/2017 1431    ALT 12 07/14/2017 1431    BILITOT 0.6 07/14/2017 1431    ESTGFRAFRICA 35.4 (A) 07/14/2017 1431    EGFRNONAA 30.6 (A) 07/14/2017 1431          Lab Results   Component Value Date    TSH 3.57 11/01/2011     Lab Results   Component Value Date    INR 1.2 06/28/2017    INR 1.1 03/14/2017    INR 1.0 04/14/2016     Lab Results   Component Value Date    WBC 7.04 06/29/2017    HGB 12.0 (L) 06/29/2017    HCT 39.4 (L) 06/29/2017    MCV 91 06/29/2017     06/29/2017     BMP  Sodium   Date Value Ref Range Status   07/14/2017 136 136 - 145 mmol/L Final     Potassium   Date Value Ref Range Status   07/14/2017 4.7 3.5 - 5.1 mmol/L Final     Chloride   Date Value Ref Range Status   07/14/2017 88 (L) 95 - 110 mmol/L Final     CO2   Date Value Ref Range Status   07/14/2017 37 (H) 23 - 29 mmol/L Final     BUN, Bld   Date Value Ref Range Status   07/14/2017 25 10 - 30 mg/dL Final     Creatinine   Date Value Ref Range Status   07/14/2017 1.8 " (H) 0.5 - 1.4 mg/dL Final     Calcium   Date Value Ref Range Status   07/14/2017 9.1 8.7 - 10.5 mg/dL Final     Anion Gap   Date Value Ref Range Status   07/14/2017 11 8 - 16 mmol/L Final     eGFR if    Date Value Ref Range Status   07/14/2017 35.4 (A) >60 mL/min/1.73 m^2 Final     eGFR if non    Date Value Ref Range Status   07/14/2017 30.6 (A) >60 mL/min/1.73 m^2 Final     Comment:     Calculation used to obtain the estimated glomerular filtration  rate (eGFR) is the CKD-EPI equation. Since race is unknown   in our information system, the eGFR values for   -American and Non--American patients are given   for each creatinine result.       CrCl cannot be calculated (Patient's most recent lab result is older than the maximum 7 days allowed.).    Assessment:     1. Chronic systolic CHF (congestive heart failure), NYHA class 3    2. Coronary artery disease of native artery of native heart with stable angina pectoris    3. Essential hypertension    4. Nonrheumatic aortic valve stenosis    5. CKD (chronic kidney disease) stage 3, GFR 30-59 ml/min    6. Uncontrolled type 2 diabetes mellitus with stage 3 chronic kidney disease, with long-term current use of insulin    7. Acute on chronic systolic (congestive) heart failure Stable     Has no evidence of decompensated HF on exam  BP is on the low side, but asymptomatic.   No CNS complaints to suggest TIA or CVA  Stable CAD-no angina or equivalent   Creatine stable  Not anticoagulated due to unsteady gait and falls   Plan:   Continue current medical management.   He is diuresing well with current dose of diuretics. Bumex 2mg daily and BID PRN  Heart healthy diet  RTC in 3 months or sooner if needed

## 2017-09-06 NOTE — LETTER
September 6, 2017      Dean Soto Jr., MD  [address] [facility]  [facility]  [facility] [address]           [facility]  [address] [place] [address]  Phone: [phone]  Fax: [phone]          Patient: Rajiv Russo   MR Number: [id number]   YOB: 1919   Date of Visit: 9/6/2017       Dear Dr. Dean Soto Jr.:    Thank you for referring Rajiv Russo to me for evaluation. Attached you will find relevant portions of my assessment and plan of care.    If you have questions, please do not hesitate to call me. I look forward to following Rajiv Russo along with you.    Sincerely,    YAMILE Odonnell    Enclosure  CC:  No Recipients    If you would like to receive this communication electronically, please contact [email or web address] or [phone] to request more information on [employer] Link access.    For providers and/or their staff who would like to refer a patient to [facility], please contact us through our one-stop-shop provider referral line, [facility], at [phone].    If you feel you have received this communication in error or would no longer like to receive these types of communications, please e-mail [email or web address]

## 2017-10-18 ENCOUNTER — TELEPHONE (OUTPATIENT)
Dept: CARDIOLOGY | Facility: CLINIC | Age: 82
End: 2017-10-18

## 2017-10-18 NOTE — TELEPHONE ENCOUNTER
Spoke to patient's daughter's and [facility] with questions regarding patient's medications and what he should be taking.  They are unaware and confused if the patient is taking amiodarone and losartan.  The daughters will check his medations at home, and call us back.

## 2017-10-18 NOTE — TELEPHONE ENCOUNTER
Spoke to patient's daughter, Elena and she got some more information regarding the medications and she will find out what exactly he is taking.

## 2017-10-18 NOTE — TELEPHONE ENCOUNTER
----- Message from Candy Fox sent at 10/18/2017  2:55 PM CDT -----  Contact: Isabelle/[facility]  Isabelle called to speak with the nurse; she stated that she seen the patient yesterday and on his medication list there is a medication called Amiodarone 200 mg - 1 tablet 2 times daily. She wants to confirm if he is supposed to be on it because if so he doesn't have any in the home and it needs to be called into his pharmacy.    [facility] [place], [place] - [address]  [address]  [place] [address]  Phone: [phone] Fax: [phone]    She can be contacted at [phone] - Cinthya  for the patient.     Thanks,  Candy

## 2017-10-20 NOTE — TELEPHONE ENCOUNTER
Spoke to patient's daughter, Sheree.  She states that patient is not taking losartan nor amiodarone, because of the side efffects he might experience.  He hasn't taken in months. I consulted Dr. Sanchez, and he is okay with patient not starting medications back now, and to just assess patient at his follow up with Agustin Barton NP in December.  She voiced understanding.

## 2017-11-17 ENCOUNTER — TELEPHONE (OUTPATIENT)
Dept: CARDIOLOGY | Facility: CLINIC | Age: 82
End: 2017-11-17

## 2017-11-17 NOTE — TELEPHONE ENCOUNTER
Returned phone call to Nurse Sheree and discussed communication note of 4 pound weight gain 202 on 11/3 and extra dose of Metolazone taken  On 11/10 faxed update note weight 198.4  Requesting signed sheet be faxed to [phone]  Will need to check with Cardiology Staff for paperwork and Dr. Sanchez probably has signed already

## 2017-11-17 NOTE — TELEPHONE ENCOUNTER
----- Message from Ana Finney sent at 11/17/2017  9:10 AM CST -----  Contact: Sheree/ [facility]  Sheree stated that they sent a MD communication twice and got no response, Please call her at [phone].    Thanks  td

## 2017-12-08 ENCOUNTER — TELEPHONE (OUTPATIENT)
Dept: CARDIOLOGY | Facility: CLINIC | Age: 82
End: 2017-12-08

## 2017-12-08 NOTE — TELEPHONE ENCOUNTER
----- Message from Candy Fox sent at 12/8/2017  2:08 PM CST -----  Contact: Iris/daughter  Iris called to find out if the patient is supposed to have a Pacemaker check before his appt. She can be contacted at [phone] or [phone].    Thanks,  Candy

## 2017-12-08 NOTE — TELEPHONE ENCOUNTER
Returned pt daughter Iris call confirmed pt's appt scheduled with Agustin on Monday [facility].

## 2017-12-11 ENCOUNTER — TELEPHONE (OUTPATIENT)
Dept: CARDIOLOGY | Facility: CLINIC | Age: 82
End: 2017-12-11

## 2017-12-11 ENCOUNTER — OFFICE VISIT (OUTPATIENT)
Dept: CARDIOLOGY | Facility: CLINIC | Age: 82
End: 2017-12-11
Payer: MEDICARE

## 2017-12-11 ENCOUNTER — LAB VISIT (OUTPATIENT)
Dept: LAB | Facility: HOSPITAL | Age: 82
End: 2017-12-11
Payer: MEDICARE

## 2017-12-11 VITALS
HEIGHT: 71 IN | WEIGHT: 205.25 LBS | HEART RATE: 72 BPM | DIASTOLIC BLOOD PRESSURE: 54 MMHG | BODY MASS INDEX: 28.73 KG/M2 | SYSTOLIC BLOOD PRESSURE: 106 MMHG

## 2017-12-11 DIAGNOSIS — E11.9 TYPE 2 DIABETES MELLITUS WITHOUT COMPLICATION, WITHOUT LONG-TERM CURRENT USE OF INSULIN: ICD-10-CM

## 2017-12-11 DIAGNOSIS — I25.118 CORONARY ARTERY DISEASE OF NATIVE ARTERY OF NATIVE HEART WITH STABLE ANGINA PECTORIS: ICD-10-CM

## 2017-12-11 DIAGNOSIS — J44.9 MIXED TYPE COPD (CHRONIC OBSTRUCTIVE PULMONARY DISEASE): ICD-10-CM

## 2017-12-11 DIAGNOSIS — N18.30 CKD (CHRONIC KIDNEY DISEASE) STAGE 3, GFR 30-59 ML/MIN: ICD-10-CM

## 2017-12-11 DIAGNOSIS — I50.22 CHRONIC SYSTOLIC CHF (CONGESTIVE HEART FAILURE), NYHA CLASS 3: Chronic | ICD-10-CM

## 2017-12-11 DIAGNOSIS — I42.9 CARDIOMYOPATHY, UNSPECIFIED TYPE: Primary | ICD-10-CM

## 2017-12-11 DIAGNOSIS — I10 ESSENTIAL HYPERTENSION: ICD-10-CM

## 2017-12-11 LAB
ANION GAP SERPL CALC-SCNC: 11 MMOL/L
BNP SERPL-MCNC: 362 PG/ML
BUN SERPL-MCNC: 44 MG/DL
CALCIUM SERPL-MCNC: 9.8 MG/DL
CHLORIDE SERPL-SCNC: 90 MMOL/L
CO2 SERPL-SCNC: 36 MMOL/L
CREAT SERPL-MCNC: 2.1 MG/DL
EST. GFR  (AFRICAN AMERICAN): 29 ML/MIN/1.73 M^2
EST. GFR  (NON AFRICAN AMERICAN): 25 ML/MIN/1.73 M^2
GLUCOSE SERPL-MCNC: 159 MG/DL
POTASSIUM SERPL-SCNC: 4 MMOL/L
SODIUM SERPL-SCNC: 137 MMOL/L

## 2017-12-11 PROCEDURE — 99214 OFFICE O/P EST MOD 30 MIN: CPT | Mod: S$GLB,,, | Performed by: NURSE PRACTITIONER

## 2017-12-11 PROCEDURE — 80048 BASIC METABOLIC PNL TOTAL CA: CPT

## 2017-12-11 PROCEDURE — 36415 COLL VENOUS BLD VENIPUNCTURE: CPT

## 2017-12-11 PROCEDURE — 99999 PR PBB SHADOW E&M-EST. PATIENT-LVL III: CPT | Mod: PBBFAC,,, | Performed by: NURSE PRACTITIONER

## 2017-12-11 PROCEDURE — 83880 ASSAY OF NATRIURETIC PEPTIDE: CPT

## 2017-12-11 RX ORDER — INSULIN LISPRO 100 [IU]/ML
4 INJECTION, SOLUTION INTRAVENOUS; SUBCUTANEOUS DAILY
COMMUNITY
Start: 2017-11-30

## 2017-12-11 RX ORDER — METOPROLOL SUCCINATE 25 MG/1
25 TABLET, EXTENDED RELEASE ORAL DAILY
COMMUNITY
Start: 2017-11-28 | End: 2018-03-21 | Stop reason: SDUPTHER

## 2017-12-11 NOTE — PROGRESS NOTES
Subjective:   Patient ID:  Rajiv Russo is a 89 y.o. male who presents for follow up of Congestive Heart Failure      HPI  Patient presents to clinic for follow up and management of CHF and AS. He has PMH of PPM, h/o CKD, DM, CAD, old MI, HLP, PAF and HTN. Patient admitted in June 2017 with decompensated HF. Noted to have EF of 40% with moderate to severe AS on echo last year. He was diuresed and discharged home with oxygen. He has PAF that was thought to contribute to his CHF. Patient has been diuresing well with Bumex 2mg daily and BID PRN. Also uses metolazone PRN. He states that he has been doing well since last visit. He denies any chest pain, SOB, orthopnea, PND, dizziness, near syncope or syncope . Still having issues with unsteady gain and falling. Not on OAC as a result. Is on ASA only therapy for CVA prophylaxis. No CNS complaints to suggest TIA or CVA. Doing well with limiting his sodium intake.       Past Medical History:   Diagnosis Date    Acute coronary syndrome     Bladder cancer     Bradycardia     CHF (congestive heart failure)     CKD (chronic kidney disease) stage 3, GFR 30-59 ml/min     Coronary artery disease     Diabetes mellitus     Heart attack     Hyperlipidemia     Hypertension     Macular degeneration     Pacemaker        Past Surgical History:   Procedure Laterality Date    BLADDER SURGERY      CARDIAC PACEMAKER PLACEMENT      CATARACT EXTRACTION      colon removal.          Social History   Substance Use Topics    Smoking status: Former Smoker    Smokeless tobacco: Former User    Alcohol use No       Family History   Problem Relation Age of Onset    No Known Problems Mother     No Known Problems Father     No Known Problems Sister     No Known Problems Brother     No Known Problems Maternal Aunt     No Known Problems Maternal Uncle     No Known Problems Paternal Aunt     No Known Problems Paternal Uncle     No Known Problems Maternal Grandmother     No  "Known Problems Maternal Grandfather     No Known Problems Paternal Grandmother     No Known Problems Paternal Grandfather     Amblyopia Neg Hx     Blindness Neg Hx     Cancer Neg Hx     Cataracts Neg Hx     Diabetes Neg Hx     Glaucoma Neg Hx     Hypertension Neg Hx     Macular degeneration Neg Hx     Retinal detachment Neg Hx     Strabismus Neg Hx     Stroke Neg Hx     Thyroid disease Neg Hx        Current Outpatient Prescriptions   Medication Sig    alprazolam (XANAX) 0.5 MG tablet Take 1 tablet (0.5 mg total) by mouth nightly as needed for Anxiety.    aspirin (ECOTRIN) 81 MG EC tablet Take 81 mg by mouth once daily.    atorvastatin (LIPITOR) 20 MG tablet Take 1 tablet (20 mg total) by mouth every evening.    BD INSULIN PEN NEEDLE UF MINI 31 gauge x 3/16" Ndle     bumetanide (BUMEX) 2 MG tablet Take 1 tablet (2 mg total) by mouth once daily. Take one tablet twice a day for two days then resume normal once daily (Patient taking differently: Take 2 mg by mouth once daily. )    cyanocobalamin 1,000 mcg/mL injection     fenofibrate 160 MG Tab Take 160 mg by mouth once daily.    glipiZIDE (GLUCOTROL) 2.5 MG TR24 Take 2.5 mg by mouth 2 (two) times daily with meals.     HUMALOG KWIKPEN 100 unit/mL InPn pen Take 4 Units by mouth once daily.    insulin glargine (LANTUS) 100 unit/mL injection Inject 15 Units into the skin 2 (two) times daily.     metOLazone (ZAROXOLYN) 5 MG tablet Take 1 tablet (5 mg total) by mouth daily as needed.    metoprolol succinate (TOPROL-XL) 25 MG 24 hr tablet Take 25 mg by mouth once daily.    nitroGLYCERIN (NITROSTAT) 0.4 MG SL tablet Place 1 tablet (0.4 mg total) under the tongue every 5 (five) minutes as needed for Chest pain.    potassium chloride SA (K-DUR,KLOR-CON) 10 MEQ tablet Take 10 mEq by mouth once.    SPIRIVA WITH HANDIHALER 18 mcg inhalation capsule Inhale 1 capsule (18 mcg total) into the lungs once daily.    tamsulosin (FLOMAX) 0.4 mg Cp24 Take 0.4 mg " "by mouth.    VIT A/VIT C/VIT E/ZINC/COPPER (PRESERVISION AREDS ORAL) Take 1 tablet by mouth once daily.     No current facility-administered medications for this visit.      Current Outpatient Prescriptions on File Prior to Visit   Medication Sig    alprazolam (XANAX) 0.5 MG tablet Take 1 tablet (0.5 mg total) by mouth nightly as needed for Anxiety.    aspirin (ECOTRIN) 81 MG EC tablet Take 81 mg by mouth once daily.    atorvastatin (LIPITOR) 20 MG tablet Take 1 tablet (20 mg total) by mouth every evening.    BD INSULIN PEN NEEDLE UF MINI 31 gauge x 3/16" Ndle     bumetanide (BUMEX) 2 MG tablet Take 1 tablet (2 mg total) by mouth once daily. Take one tablet twice a day for two days then resume normal once daily (Patient taking differently: Take 2 mg by mouth once daily. )    cyanocobalamin 1,000 mcg/mL injection     fenofibrate 160 MG Tab Take 160 mg by mouth once daily.    glipiZIDE (GLUCOTROL) 2.5 MG TR24 Take 2.5 mg by mouth 2 (two) times daily with meals.     insulin glargine (LANTUS) 100 unit/mL injection Inject 15 Units into the skin 2 (two) times daily.     metOLazone (ZAROXOLYN) 5 MG tablet Take 1 tablet (5 mg total) by mouth daily as needed.    nitroGLYCERIN (NITROSTAT) 0.4 MG SL tablet Place 1 tablet (0.4 mg total) under the tongue every 5 (five) minutes as needed for Chest pain.    potassium chloride SA (K-DUR,KLOR-CON) 10 MEQ tablet Take 10 mEq by mouth once.    SPIRIVA WITH HANDIHALER 18 mcg inhalation capsule Inhale 1 capsule (18 mcg total) into the lungs once daily.    tamsulosin (FLOMAX) 0.4 mg Cp24 Take 0.4 mg by mouth.    [DISCONTINUED] oxycodone-acetaminophen (PERCOCET) 5-325 mg per tablet Take 1 tablet by mouth every 6 (six) hours as needed.     No current facility-administered medications on file prior to visit.        Review of Systems   Constitution: Negative for decreased appetite, weakness, malaise/fatigue, weight gain and weight loss.   HENT: Positive for hearing loss. " Negative for nosebleeds.    Cardiovascular: Negative for chest pain, claudication, dyspnea on exertion, irregular heartbeat, leg swelling, near-syncope, orthopnea, palpitations, paroxysmal nocturnal dyspnea and syncope.   Respiratory: Negative for cough, shortness of breath, sleep disturbances due to breathing, snoring and wheezing.         Uses oxygen PRN   Hematologic/Lymphatic: Negative for bleeding problem. Does not bruise/bleed easily.   Skin: Negative for rash.   Musculoskeletal: Negative for arthritis, back pain, falls, joint pain, joint swelling, muscle cramps, muscle weakness and myalgias.   Gastrointestinal: Negative for bloating, abdominal pain, constipation, diarrhea, heartburn, nausea and vomiting.   Genitourinary: Negative for dysuria, hematuria and nocturia.   Neurological: Negative for excessive daytime sleepiness, dizziness, light-headedness, loss of balance, numbness, paresthesias and vertigo.   Psychiatric/Behavioral: Positive for memory loss.       Objective:   Physical Exam   Constitutional: He is oriented to person, place, and time. He appears well-developed and well-nourished.   Neck: Neck supple. No JVD present.   Cardiovascular: Normal rate, regular rhythm and normal pulses.  Exam reveals no friction rub.    Murmur heard.   Harsh midsystolic murmur is present  at the upper right sternal border radiating to the neck  Pulmonary/Chest: Effort normal and breath sounds normal. No respiratory distress. He has no wheezes. He has no rales.   PPM pocket WNL    Abdominal: Soft. Bowel sounds are normal. He exhibits no distension.   Musculoskeletal: He exhibits no edema or tenderness.   Neurological: He is alert and oriented to person, place, and time.   Skin: Skin is warm and dry. No rash noted.   Psychiatric: He has a normal mood and affect. His behavior is normal.   Nursing note and vitals reviewed.    Vitals:    12/11/17 1105   BP: (!) 106/54   BP Location: Right arm   Patient Position: Sitting   BP  "Method: Large (Manual)   Pulse: 72   Weight: 93.1 kg (205 lb 4 oz)   Height: 5' 11" (1.803 m)     Lab Results   Component Value Date    CHOL 186 11/02/2011     Lab Results   Component Value Date    HDL 45 11/02/2011     Lab Results   Component Value Date    LDLCALC 111.0 11/02/2011     Lab Results   Component Value Date    TRIG 149 11/02/2011     Lab Results   Component Value Date    CHOLHDL 24.2 11/02/2011       Chemistry        Component Value Date/Time     07/14/2017 1431    K 4.7 07/14/2017 1431    CL 88 (L) 07/14/2017 1431    CO2 37 (H) 07/14/2017 1431    BUN 25 07/14/2017 1431    CREATININE 1.8 (H) 07/14/2017 1431     (H) 07/14/2017 1431        Component Value Date/Time    CALCIUM 9.1 07/14/2017 1431    ALKPHOS 41 (L) 07/14/2017 1431    AST 26 07/14/2017 1431    ALT 12 07/14/2017 1431    BILITOT 0.6 07/14/2017 1431    ESTGFRAFRICA 35.4 (A) 07/14/2017 1431    EGFRNONAA 30.6 (A) 07/14/2017 1431          Lab Results   Component Value Date    TSH 3.57 11/01/2011     Lab Results   Component Value Date    INR 1.2 06/28/2017    INR 1.1 03/14/2017    INR 1.0 04/14/2016     Lab Results   Component Value Date    WBC 7.04 06/29/2017    HGB 12.0 (L) 06/29/2017    HCT 39.4 (L) 06/29/2017    MCV 91 06/29/2017     06/29/2017     BMP  Sodium   Date Value Ref Range Status   07/14/2017 136 136 - 145 mmol/L Final     Potassium   Date Value Ref Range Status   07/14/2017 4.7 3.5 - 5.1 mmol/L Final     Chloride   Date Value Ref Range Status   07/14/2017 88 (L) 95 - 110 mmol/L Final     CO2   Date Value Ref Range Status   07/14/2017 37 (H) 23 - 29 mmol/L Final     BUN, Bld   Date Value Ref Range Status   07/14/2017 25 10 - 30 mg/dL Final     Creatinine   Date Value Ref Range Status   07/14/2017 1.8 (H) 0.5 - 1.4 mg/dL Final     Calcium   Date Value Ref Range Status   07/14/2017 9.1 8.7 - 10.5 mg/dL Final     Anion Gap   Date Value Ref Range Status   07/14/2017 11 8 - 16 mmol/L Final     eGFR if African American "   Date Value Ref Range Status   07/14/2017 35.4 (A) >60 mL/min/1.73 m^2 Final     eGFR if non    Date Value Ref Range Status   07/14/2017 30.6 (A) >60 mL/min/1.73 m^2 Final     Comment:     Calculation used to obtain the estimated glomerular filtration  rate (eGFR) is the CKD-EPI equation. Since race is unknown   in our information system, the eGFR values for   -American and Non--American patients are given   for each creatinine result.       CrCl cannot be calculated (Patient's most recent lab result is older than the maximum 7 days allowed.).    Assessment:     1. Cardiomyopathy, unspecified type    2. Mixed type COPD (chronic obstructive pulmonary disease)    3. Chronic systolic CHF (congestive heart failure), NYHA class 3    4. Coronary artery disease of native artery of native heart with stable angina pectoris    5. Essential hypertension    6. CKD (chronic kidney disease) stage 3, GFR 30-59 ml/min    7. Type 2 diabetes mellitus without complication, without long-term current use of insulin      Has no evidence of decompensated HF on exam  BP is on the low side, but asymptomatic.   No CNS complaints to suggest TIA or CVA  Stable CAD-no angina or equivalent   Not anticoagulated due to unsteady gait and falls   He is diuresing well with current dose of diuretics. Bumex 2mg daily and BID PRN  BMP needs to be checked     Plan:   Continue current medical management.   Heart healthy diet  Check BMP today-phone review of results   RTC in 3 months or sooner if needed

## 2017-12-28 ENCOUNTER — TELEPHONE (OUTPATIENT)
Dept: CARDIOLOGY | Facility: CLINIC | Age: 82
End: 2017-12-28

## 2017-12-28 NOTE — TELEPHONE ENCOUNTER
Beth with [facility] ([phone]) called stating that patient's weight has been fluctuating over the last few days.  Today it is 208.8, yesterday, 205.8, Tuesday 206.2 and Christmas 202.8.  He is taking Bumex 2mg every other day.  Patient since has not taken metolazone since 12/23 due to kidney function.  Also, he has been staying away from salt.

## 2017-12-28 NOTE — TELEPHONE ENCOUNTER
Called and spoke with Beth.  Gave her verbal orders for bumex 2mg daily from now till Tuesday and to do repeat bmp and bnp on patient and call with results and patient update on Tuesday. She gave correct read-back and voiced understanding.

## 2017-12-28 NOTE — TELEPHONE ENCOUNTER
He should take Bumex daily starting today and throughout the weekend. Phone review on Monday with BMP and BNP on Monday

## 2017-12-31 ENCOUNTER — HOSPITAL ENCOUNTER (EMERGENCY)
Facility: HOSPITAL | Age: 82
Discharge: HOME | End: 2017-12-31
Attending: EMERGENCY MEDICINE
Payer: MEDICARE

## 2017-12-31 VITALS
OXYGEN SATURATION: 94 % | WEIGHT: 207 LBS | SYSTOLIC BLOOD PRESSURE: 123 MMHG | DIASTOLIC BLOOD PRESSURE: 67 MMHG | HEIGHT: 71 IN | BODY MASS INDEX: 28.98 KG/M2 | RESPIRATION RATE: 18 BRPM | TEMPERATURE: 99 F | HEART RATE: 69 BPM

## 2017-12-31 DIAGNOSIS — R07.9 CHEST PAIN: ICD-10-CM

## 2017-12-31 DIAGNOSIS — J90 PLEURAL EFFUSION: Primary | ICD-10-CM

## 2017-12-31 DIAGNOSIS — B34.9 VIRAL SYNDROME: ICD-10-CM

## 2017-12-31 LAB
ALBUMIN SERPL BCP-MCNC: 3.2 G/DL
ALP SERPL-CCNC: 41 U/L
ALT SERPL W/O P-5'-P-CCNC: 7 U/L
ANION GAP SERPL CALC-SCNC: 10 MMOL/L
AST SERPL-CCNC: 16 U/L
BACTERIA #/AREA URNS HPF: ABNORMAL /HPF
BASOPHILS # BLD AUTO: 0.02 K/UL
BASOPHILS NFR BLD: 0.1 %
BILIRUB SERPL-MCNC: 0.8 MG/DL
BILIRUB UR QL STRIP: NEGATIVE
BNP SERPL-MCNC: 878 PG/ML
BUN SERPL-MCNC: 33 MG/DL
CALCIUM SERPL-MCNC: 9 MG/DL
CHLORIDE SERPL-SCNC: 100 MMOL/L
CLARITY UR: CLEAR
CO2 SERPL-SCNC: 30 MMOL/L
COLOR UR: YELLOW
CREAT SERPL-MCNC: 1.7 MG/DL
DIFFERENTIAL METHOD: ABNORMAL
EOSINOPHIL # BLD AUTO: 0 K/UL
EOSINOPHIL NFR BLD: 0.3 %
ERYTHROCYTE [DISTWIDTH] IN BLOOD BY AUTOMATED COUNT: 14 %
EST. GFR  (AFRICAN AMERICAN): 38 ML/MIN/1.73 M^2
EST. GFR  (NON AFRICAN AMERICAN): 33 ML/MIN/1.73 M^2
FLUAV AG SPEC QL IA: NEGATIVE
FLUBV AG SPEC QL IA: NEGATIVE
GLUCOSE SERPL-MCNC: 127 MG/DL
GLUCOSE UR QL STRIP: NEGATIVE
HCT VFR BLD AUTO: 36.7 %
HGB BLD-MCNC: 11.4 G/DL
HGB UR QL STRIP: ABNORMAL
KETONES UR QL STRIP: NEGATIVE
LACTATE SERPL-SCNC: 1 MMOL/L
LEUKOCYTE ESTERASE UR QL STRIP: ABNORMAL
LYMPHOCYTES # BLD AUTO: 1 K/UL
LYMPHOCYTES NFR BLD: 6.8 %
MCH RBC QN AUTO: 29.5 PG
MCHC RBC AUTO-ENTMCNC: 31.1 G/DL
MCV RBC AUTO: 95 FL
MICROSCOPIC COMMENT: ABNORMAL
MONOCYTES # BLD AUTO: 1.1 K/UL
MONOCYTES NFR BLD: 7.8 %
NEUTROPHILS # BLD AUTO: 11.9 K/UL
NEUTROPHILS NFR BLD: 85 %
NITRITE UR QL STRIP: NEGATIVE
PH UR STRIP: 6 [PH] (ref 5–8)
PLATELET # BLD AUTO: 178 K/UL
PMV BLD AUTO: 9.8 FL
POTASSIUM SERPL-SCNC: 3.8 MMOL/L
PROT SERPL-MCNC: 6.6 G/DL
PROT UR QL STRIP: ABNORMAL
RBC # BLD AUTO: 3.87 M/UL
RBC #/AREA URNS HPF: 12 /HPF (ref 0–4)
SODIUM SERPL-SCNC: 140 MMOL/L
SP GR UR STRIP: 1.01 (ref 1–1.03)
SPECIMEN SOURCE: NORMAL
TROPONIN I SERPL DL<=0.01 NG/ML-MCNC: 0.03 NG/ML
TROPONIN I SERPL DL<=0.01 NG/ML-MCNC: 0.03 NG/ML
URN SPEC COLLECT METH UR: ABNORMAL
UROBILINOGEN UR STRIP-ACNC: NEGATIVE EU/DL
WBC # BLD AUTO: 14 K/UL
WBC #/AREA URNS HPF: 50 /HPF (ref 0–5)
WBC CLUMPS URNS QL MICRO: ABNORMAL

## 2017-12-31 PROCEDURE — 99285 EMERGENCY DEPT VISIT HI MDM: CPT | Mod: 25

## 2017-12-31 PROCEDURE — 87040 BLOOD CULTURE FOR BACTERIA: CPT

## 2017-12-31 PROCEDURE — 93005 ELECTROCARDIOGRAM TRACING: CPT

## 2017-12-31 PROCEDURE — 36415 COLL VENOUS BLD VENIPUNCTURE: CPT

## 2017-12-31 PROCEDURE — 96374 THER/PROPH/DIAG INJ IV PUSH: CPT

## 2017-12-31 PROCEDURE — 80053 COMPREHEN METABOLIC PANEL: CPT

## 2017-12-31 PROCEDURE — 87400 INFLUENZA A/B EACH AG IA: CPT

## 2017-12-31 PROCEDURE — 84484 ASSAY OF TROPONIN QUANT: CPT | Mod: 91

## 2017-12-31 PROCEDURE — 85025 COMPLETE CBC W/AUTO DIFF WBC: CPT

## 2017-12-31 PROCEDURE — 81000 URINALYSIS NONAUTO W/SCOPE: CPT

## 2017-12-31 PROCEDURE — 93010 ELECTROCARDIOGRAM REPORT: CPT | Mod: ,,, | Performed by: INTERNAL MEDICINE

## 2017-12-31 PROCEDURE — 63600175 PHARM REV CODE 636 W HCPCS: Performed by: EMERGENCY MEDICINE

## 2017-12-31 PROCEDURE — 83880 ASSAY OF NATRIURETIC PEPTIDE: CPT

## 2017-12-31 PROCEDURE — 83605 ASSAY OF LACTIC ACID: CPT

## 2017-12-31 PROCEDURE — 27000221 HC OXYGEN, UP TO 24 HOURS

## 2017-12-31 RX ORDER — ONDANSETRON 4 MG/1
4 TABLET, FILM COATED ORAL EVERY 6 HOURS
Qty: 12 TABLET | Refills: 0 | Status: SHIPPED | OUTPATIENT
Start: 2017-12-31

## 2017-12-31 RX ORDER — FUROSEMIDE 10 MG/ML
20 INJECTION INTRAMUSCULAR; INTRAVENOUS
Status: COMPLETED | OUTPATIENT
Start: 2017-12-31 | End: 2017-12-31

## 2017-12-31 RX ADMIN — FUROSEMIDE 20 MG: 10 INJECTION, SOLUTION INTRAVENOUS at 01:12

## 2017-12-31 NOTE — ED PROVIDER NOTES
SCRIBE #1 NOTE: I, Umair Holley, am scribing for, and in the presence of, Deanna Payne Do, MD. I have scribed the entire note.      History      Chief Complaint   Patient presents with    Fatigue     pt feeling weak this AM and weight gain over the last 3 weeks hx of chf       Review of patient's allergies indicates:  No Known Allergies     HPI   HPI    12/31/2017, 10:32 AM   History obtained from the patient      History of Present Illness: Rajiv Russo is a 89 y.o. male patient who presents to the Emergency Department for an evaluation of fatigue which onset gradually today. Pt reports he woke up fatigued. Pt has also reportedly been retaining more fluid after his fluid medication was changed to every other day. Symptoms are constant and moderate in severity. Exacerbated by nothing and relieved by nothing. Associated sxs include cough and weight gain over the last x3 weeks. Patient denies any fever, chills, CP, SOB, abd  Pain, N/V, HA, weakness, and all other sxs at this time. No further complaints or concerns at this time.         Arrival mode: Personal vehicle    PCP: Dean Soto Jr, MD       Past Medical History:  Past Medical History:   Diagnosis Date    Acute coronary syndrome     Bladder cancer     Bradycardia     CHF (congestive heart failure)     CKD (chronic kidney disease) stage 3, GFR 30-59 ml/min     Coronary artery disease     Diabetes mellitus     Heart attack     Hyperlipidemia     Hypertension     Macular degeneration     Pacemaker        Past Surgical History:  Past Surgical History:   Procedure Laterality Date    BLADDER SURGERY      CARDIAC PACEMAKER PLACEMENT      CATARACT EXTRACTION      colon removal.            Family History:  Family History   Problem Relation Age of Onset    No Known Problems Mother     No Known Problems Father     No Known Problems Sister     No Known Problems Brother     No Known Problems Maternal Aunt     No Known Problems Maternal  Uncle     No Known Problems Paternal Aunt     No Known Problems Paternal Uncle     No Known Problems Maternal Grandmother     No Known Problems Maternal Grandfather     No Known Problems Paternal Grandmother     No Known Problems Paternal Grandfather     Amblyopia Neg Hx     Blindness Neg Hx     Cancer Neg Hx     Cataracts Neg Hx     Diabetes Neg Hx     Glaucoma Neg Hx     Hypertension Neg Hx     Macular degeneration Neg Hx     Retinal detachment Neg Hx     Strabismus Neg Hx     Stroke Neg Hx     Thyroid disease Neg Hx        Social History:  Social History     Social History Main Topics    Smoking status: Former Smoker    Smokeless tobacco: Former User    Alcohol use No    Drug use: No    Sexual activity: Unknown       ROS   Review of Systems   Constitutional: Positive for fatigue and unexpected weight change (Gain). Negative for chills and fever.   HENT: Negative for congestion and sore throat.    Respiratory: Positive for cough. Negative for chest tightness and shortness of breath.    Cardiovascular: Negative for chest pain.   Gastrointestinal: Negative for abdominal pain, nausea and vomiting.   Genitourinary: Negative for dysuria and hematuria.   Musculoskeletal: Negative for back pain and neck pain.   Skin: Negative for rash.   Neurological: Negative for weakness and headaches.   Hematological: Does not bruise/bleed easily.     Physical Exam      Initial Vitals [12/31/17 1027]   BP Pulse Resp Temp SpO2   (!) 114/55 71 (!) 24 99 °F (37.2 °C) 96 %      MAP       74.67          Physical Exam  Nursing Notes and Vital Signs Reviewed.  Constitutional: Patient is in no acute distress. Elderly.   Head: Atraumatic. Normocephalic.  Eyes: PERRL. EOM intact. Conjunctivae are not pale. No scleral icterus.  ENT: Mucous membranes are moist. Oropharynx is clear and symmetric.    Neck: Supple. Full ROM. No lymphadenopathy.  Cardiovascular: Regular rate. Regular rhythm.  Pulmonary/Chest: No respiratory  "distress. Mild rales. No retractions on respiration.   Abdominal: Soft and non-distended.  There is no tenderness.  Musculoskeletal: Moves all extremities. No obvious deformities. No edema. No calf tenderness.  Skin: Warm and dry.  Neurological:  Alert, awake, and appropriate.  Normal speech.  No acute focal neurological deficits are appreciated.  Psychiatric: Normal affect. Good eye contact. Appropriate in content.    ED Course    Procedures  ED Vital Signs:  Vitals:    12/31/17 1027 12/31/17 1144 12/31/17 1146 12/31/17 1302   BP: (!) 114/55 (!) 113/58     Pulse: 71 69 69    Resp: (!) 24  19    Temp: 99 °F (37.2 °C)   99.1 °F (37.3 °C)   TempSrc: Oral   Rectal   SpO2: 96%  95%    Weight: 93.9 kg (207 lb)      Height: 5' 11" (1.803 m)       12/31/17 1307 12/31/17 1409   BP:     Pulse: 69 69   Resp: (!) 22 20   Temp:     TempSrc:     SpO2: 95% 95%   Weight:     Height:         Abnormal Lab Results:  Labs Reviewed   CBC W/ AUTO DIFFERENTIAL - Abnormal; Notable for the following:        Result Value    WBC 14.00 (*)     RBC 3.87 (*)     Hemoglobin 11.4 (*)     Hematocrit 36.7 (*)     MCHC 31.1 (*)     Gran # 11.9 (*)     Mono # 1.1 (*)     Gran% 85.0 (*)     Lymph% 6.8 (*)     All other components within normal limits   COMPREHENSIVE METABOLIC PANEL - Abnormal; Notable for the following:     CO2 30 (*)     Glucose 127 (*)     BUN, Bld 33 (*)     Creatinine 1.7 (*)     Albumin 3.2 (*)     Alkaline Phosphatase 41 (*)     ALT 7 (*)     eGFR if  38 (*)     eGFR if non  33 (*)     All other components within normal limits   B-TYPE NATRIURETIC PEPTIDE - Abnormal; Notable for the following:      (*)     All other components within normal limits   TROPONIN I - Abnormal; Notable for the following:     Troponin I 0.031 (*)     All other components within normal limits   URINALYSIS - Abnormal; Notable for the following:     Protein, UA Trace (*)     Occult Blood UA 1+ (*)     Leukocytes, UA " 1+ (*)     All other components within normal limits   URINALYSIS MICROSCOPIC - Abnormal; Notable for the following:     RBC, UA 12 (*)     WBC, UA 50 (*)     WBC Clumps, UA Few (*)     All other components within normal limits   CULTURE, BLOOD   CULTURE, BLOOD   TROPONIN I   INFLUENZA A AND B ANTIGEN   LACTIC ACID, PLASMA        All Lab Results:  Results for orders placed or performed during the hospital encounter of 12/31/17   CBC auto differential   Result Value Ref Range    WBC 14.00 (H) 3.90 - 12.70 K/uL    RBC 3.87 (L) 4.60 - 6.20 M/uL    Hemoglobin 11.4 (L) 14.0 - 18.0 g/dL    Hematocrit 36.7 (L) 40.0 - 54.0 %    MCV 95 82 - 98 fL    MCH 29.5 27.0 - 31.0 pg    MCHC 31.1 (L) 32.0 - 36.0 g/dL    RDW 14.0 11.5 - 14.5 %    Platelets 178 150 - 350 K/uL    MPV 9.8 9.2 - 12.9 fL    Gran # 11.9 (H) 1.8 - 7.7 K/uL    Lymph # 1.0 1.0 - 4.8 K/uL    Mono # 1.1 (H) 0.3 - 1.0 K/uL    Eos # 0.0 0.0 - 0.5 K/uL    Baso # 0.02 0.00 - 0.20 K/uL    Gran% 85.0 (H) 38.0 - 73.0 %    Lymph% 6.8 (L) 18.0 - 48.0 %    Mono% 7.8 4.0 - 15.0 %    Eosinophil% 0.3 0.0 - 8.0 %    Basophil% 0.1 0.0 - 1.9 %    Differential Method Automated    Comprehensive metabolic panel   Result Value Ref Range    Sodium 140 136 - 145 mmol/L    Potassium 3.8 3.5 - 5.1 mmol/L    Chloride 100 95 - 110 mmol/L    CO2 30 (H) 23 - 29 mmol/L    Glucose 127 (H) 70 - 110 mg/dL    BUN, Bld 33 (H) 10 - 30 mg/dL    Creatinine 1.7 (H) 0.5 - 1.4 mg/dL    Calcium 9.0 8.7 - 10.5 mg/dL    Total Protein 6.6 6.0 - 8.4 g/dL    Albumin 3.2 (L) 3.5 - 5.2 g/dL    Total Bilirubin 0.8 0.1 - 1.0 mg/dL    Alkaline Phosphatase 41 (L) 55 - 135 U/L    AST 16 10 - 40 U/L    ALT 7 (L) 10 - 44 U/L    Anion Gap 10 8 - 16 mmol/L    eGFR if African American 38 (A) >60 mL/min/1.73 m^2    eGFR if non African American 33 (A) >60 mL/min/1.73 m^2   Troponin I #1   Result Value Ref Range    Troponin I 0.026 0.000 - 0.026 ng/mL   B-Type natriuretic peptide (BNP)   Result Value Ref Range      (H) 0 - 99 pg/mL   Troponin I #2   Result Value Ref Range    Troponin I 0.031 (H) 0.000 - 0.026 ng/mL   Influenza antigen Nasopharyngeal Swab   Result Value Ref Range    Influenza A Ag, EIA Negative Negative    Influenza B Ag, EIA Negative Negative    Flu A & B Source Nasopharyngeal Swab    Urinalysis   Result Value Ref Range    Specimen UA Urine, Clean Catch     Color, UA Yellow Yellow, Straw, Mireya    Appearance, UA Clear Clear    pH, UA 6.0 5.0 - 8.0    Specific Gravity, UA 1.015 1.005 - 1.030    Protein, UA Trace (A) Negative    Glucose, UA Negative Negative    Ketones, UA Negative Negative    Bilirubin (UA) Negative Negative    Occult Blood UA 1+ (A) Negative    Nitrite, UA Negative Negative    Urobilinogen, UA Negative <2.0 EU/dL    Leukocytes, UA 1+ (A) Negative   Lactic acid, plasma   Result Value Ref Range    Lactate (Lactic Acid) 1.0 0.5 - 2.2 mmol/L   Urinalysis Microscopic   Result Value Ref Range    RBC, UA 12 (H) 0 - 4 /hpf    WBC, UA 50 (H) 0 - 5 /hpf    WBC Clumps, UA Few (A) None-Rare    Bacteria, UA Rare None-Occ /hpf    Microscopic Comment SEE COMMENT          Imaging Results:  Imaging Results          X-Ray Chest PA And Lateral (Final result)  Result time 12/31/17 11:22:51    Final result by Jaskaran Gomez MD (12/31/17 11:22:51)                 Impression:         Small bilateral pleural effusions and right middle lobe consolidation.          Electronically signed by: JASKARAN GOMEZ MD  Date:     12/31/17  Time:    11:22              Narrative:    Exam: XR CHEST PA AND LATERAL    Clinical History:   Acute onset Chest Pain    Findings:   Small bilateral pleural effusions.  Right middle lobe consolidation.  The upper lobes are clear. Aortic atherosclerosis.  Stable pacemaker present.                                  The EKG was ordered, reviewed, and independently interpreted by the ED provider.  Interpretation time: 10:26  Rate: 72 BPM  Rhythm: Ventricularly paced  Interpretation: Left axis  deviation. Nonspecific intraventricular block. No STEMI.        The Emergency Provider reviewed the vital signs and test results, which are outlined above.    ED Discussion     3:47 PM: Dr. Garcia discussed the pt's case with Dr. Sanchez (Cardiology) who recommends putting pt on Bumex 2mg x1 daily.    3:59 PM: Reassessed pt at this time. Pt is awake, alert, and in NAD. Pt states his condition has improved at this time. Discussed with pt all pertinent ED information and results. Discussed pt dx of pleural effusion and plan of tx. Gave pt all f/u and return to the ED instructions. All questions and concerns were addressed at this time. Pt expresses understanding of information and instructions, and is comfortable with plan to discharge. Pt is stable for discharge.    I discussed with patient and/or family/caretaker that evaluation in the ED does not suggest any emergent or life threatening medical conditions requiring immediate intervention beyond what was provided in the ED, and I believe patient is safe for discharge.  Regardless, an unremarkable evaluation in the ED does not preclude the development or presence of a serious of life threatening condition. As such, patient was instructed to return immediately for any worsening or change in current symptoms.      ED Medication(s):  Medications   furosemide injection 20 mg (20 mg Intravenous Given 12/31/17 1346)       New Prescriptions    ONDANSETRON (ZOFRAN) 4 MG TABLET    Take 1 tablet (4 mg total) by mouth every 6 (six) hours.       Follow-up Information     Dean Soto Jr, MD In 2 days.    Specialty:  Family Medicine  Contact information:  [address]  [facility]  [place] [address] [phone]             Dyllan Sanchez MD.    Specialty:  Cardiology  Contact information:  [address]  [place] [address] [phone]                     Medical Decision Making    Medical Decision Making:   Clinical Tests:   Lab Tests: Ordered and  Reviewed  Radiological Study: Ordered and Reviewed  Medical Tests: Ordered and Reviewed           Scribe Attestation:   Scribe #1: I performed the above scribed service and the documentation accurately describes the services I performed. I attest to the accuracy of the note.    Attending:   Physician Attestation Statement for Scribe #1: I, Deanna Payne Do, MD, personally performed the services described in this documentation, as scribed by Umair Holley, in my presence, and it is both accurate and complete.          Clinical Impression       ICD-10-CM ICD-9-CM   1. Pleural effusion J90 511.9   2. Chest pain R07.9 786.50   3. Viral syndrome B34.9 079.99       Disposition:   Disposition: Discharged  Condition: Stable         Deanna Payne Do, MD  12/31/17 7150

## 2017-12-31 NOTE — ED NOTES
"    Level of Consciousness: The patient is awake, alert, and oriented with appropriate affect and speech; oriented to person, place and time.  Appearance: Sitting up in bed with no acute distress noted. Clothing and hygiene are clean and worn appropriately.  Skin: Skin is warm and dry with good skin turgor; intact; color consistent with ethnicity.  Mucous membranes are moist.   Musculoskeletal: Moves all extremities well in full range of motion. No obvious deformities or swelling noted.  Respiratory: Airway open and patent, respirations spontaneous, even and unlabored. No accessory muscles in use. + for productive cough. Pt states he has been feeling "more tired". Pt wears 3L O2 at home.   Cardiac: Regular rate and rhythm, good pulses palpated peripherally, capillary refill < 3 seconds.  Abdomen: Soft, non-tender to palpation. No distention noted.  Neurologic: PERRLA, face exhibits symmetrical expression, hand grasps equal and even bilaterally, normal sensation noted to all extremities and face when touched by a finger.        "

## 2018-01-03 ENCOUNTER — TELEPHONE (OUTPATIENT)
Dept: CARDIOLOGY | Facility: CLINIC | Age: 83
End: 2018-01-03

## 2018-01-03 NOTE — TELEPHONE ENCOUNTER
Continue current dose of Bumex 2mg daily. The metolazone should only be PRN. His Creatine is stable for now, but he needs to replace potassium. He should increase KCL to 20meq BID x2 days, then 20meq daily. Repeat BMP and BNP on Monday

## 2018-01-03 NOTE — TELEPHONE ENCOUNTER
Sheree with [facility] called with cmp results (also faxed over).  Potassium:  3.0 and Creatinine:  1.82.  Patient is taking Bumex 2mg daily, potassium 10 meq daily and metolazone 5mg as needed.  His last dosage of metolazone was 12/31 cause weight was over 206.

## 2018-01-04 NOTE — TELEPHONE ENCOUNTER
Attempted without success to reach patient.  Message left on voicemail for HH nurse to call back.

## 2018-01-05 LAB
BACTERIA BLD CULT: NORMAL
BACTERIA BLD CULT: NORMAL

## 2018-01-30 ENCOUNTER — OFFICE VISIT (OUTPATIENT)
Dept: INTERNAL MEDICINE | Facility: CLINIC | Age: 83
End: 2018-01-30
Payer: COMMERCIAL

## 2018-01-30 VITALS
OXYGEN SATURATION: 96 % | TEMPERATURE: 98 F | HEIGHT: 71 IN | WEIGHT: 209 LBS | DIASTOLIC BLOOD PRESSURE: 58 MMHG | HEART RATE: 71 BPM | SYSTOLIC BLOOD PRESSURE: 94 MMHG | BODY MASS INDEX: 29.26 KG/M2

## 2018-01-30 DIAGNOSIS — E78.5 HYPERLIPIDEMIA ASSOCIATED WITH TYPE 2 DIABETES MELLITUS: ICD-10-CM

## 2018-01-30 DIAGNOSIS — I25.118 CORONARY ARTERY DISEASE OF NATIVE ARTERY OF NATIVE HEART WITH STABLE ANGINA PECTORIS: ICD-10-CM

## 2018-01-30 DIAGNOSIS — E11.9 DIABETES MELLITUS TYPE 2 IN NONOBESE: Primary | ICD-10-CM

## 2018-01-30 DIAGNOSIS — E11.69 HYPERLIPIDEMIA ASSOCIATED WITH TYPE 2 DIABETES MELLITUS: ICD-10-CM

## 2018-01-30 PROCEDURE — 1126F AMNT PAIN NOTED NONE PRSNT: CPT | Mod: S$GLB,,, | Performed by: FAMILY MEDICINE

## 2018-01-30 PROCEDURE — 3008F BODY MASS INDEX DOCD: CPT | Mod: S$GLB,,, | Performed by: FAMILY MEDICINE

## 2018-01-30 PROCEDURE — 99999 PR PBB SHADOW E&M-EST. PATIENT-LVL III: CPT | Mod: PBBFAC,,, | Performed by: FAMILY MEDICINE

## 2018-01-30 PROCEDURE — 99203 OFFICE O/P NEW LOW 30 MIN: CPT | Mod: S$GLB,,, | Performed by: FAMILY MEDICINE

## 2018-01-30 PROCEDURE — 1159F MED LIST DOCD IN RCRD: CPT | Mod: S$GLB,,, | Performed by: FAMILY MEDICINE

## 2018-01-30 RX ORDER — FENOFIBRATE 160 MG/1
160 TABLET ORAL DAILY
Qty: 90 TABLET | Refills: 1 | Status: SHIPPED | OUTPATIENT
Start: 2018-01-30 | End: 2018-03-21 | Stop reason: SDUPTHER

## 2018-01-30 RX ORDER — ATORVASTATIN CALCIUM 20 MG/1
20 TABLET, FILM COATED ORAL NIGHTLY
Qty: 90 TABLET | Refills: 1 | Status: SHIPPED | OUTPATIENT
Start: 2018-01-30 | End: 2018-03-21 | Stop reason: SDUPTHER

## 2018-01-30 NOTE — PROGRESS NOTES
"Subjective:       Patient ID: Rajiv Russo is a 89 y.o. male here today to establish care.    Chief Complaint: Establish Care and Medication Refill (Fenofibrate,Atorvastatin)        Patient presents to clinic today to establish care. His daughters are present with him. He was previously followed by Dr. Dean Soto. He reports having labs recently. He lives alone, he has sitters during the day until about 9 pm, they prepare his meals and assist with his medications. His daughters check on him. He has macular degeneration, he is no longer seeing ophthalmology as they advised they were unable to do more for him. He no longer drives. Reports remote history of tobacco use. He ambulates with walker, he reports he "has had falls over the years". They do not indicate recent falls. He reports episode of hypoglycemia a couple of weeks ago, he treated with peppermint. He has life alert as well.          Review of Systems   Constitutional: Negative for chills, fatigue, fever and unexpected weight change.   Eyes: Negative for visual disturbance.   Respiratory: Negative for shortness of breath.    Cardiovascular: Negative for chest pain.   Musculoskeletal: Negative for myalgias.   Neurological: Negative for headaches.       Objective:      Physical Exam   Constitutional: He is oriented to person, place, and time. He appears well-developed and well-nourished. No distress.   HENT:   Head: Normocephalic and atraumatic.   Eyes: Conjunctivae and EOM are normal. Pupils are equal, round, and reactive to light. No scleral icterus.   Cardiovascular: Normal rate and regular rhythm.  Exam reveals no gallop and no friction rub.    No murmur heard.  Pulmonary/Chest: Effort normal and breath sounds normal.   Neurological: He is alert and oriented to person, place, and time.   Ambulates with walker   Psychiatric: He has a normal mood and affect.   Vitals reviewed.      Assessment:       1. Diabetes mellitus type 2 in nonobese    2. " Hyperlipidemia associated with type 2 diabetes mellitus    3. Coronary artery disease of native artery of native heart with stable angina pectoris        Plan:     Problem List Items Addressed This Visit     CAD (coronary artery disease)    Overview     Followed by Dr. Sanchez, Cardiology         Hyperlipidemia associated with type 2 diabetes mellitus    Relevant Medications    fenofibrate 160 MG Tab    atorvastatin (LIPITOR) 20 MG tablet    Other Relevant Orders    Comprehensive metabolic panel    Lipid panel    Diabetes mellitus type 2 in nonobese - Primary    Relevant Orders    Hemoglobin A1c          Release signed for records from Dr. Soto.   Patient followed by Dr. Sanchez (Cardiology), Dr. Gonsalves (Pulmonology) and Dr. Estevez (Nephrology).  Discussed treatment of hypoglycemia and advised to notify me if he has this persistently.  Family/patient express understanding and agreement with above plan.

## 2018-02-02 PROBLEM — E11.69 HYPERLIPIDEMIA ASSOCIATED WITH TYPE 2 DIABETES MELLITUS: Status: ACTIVE | Noted: 2018-02-02

## 2018-02-02 PROBLEM — E11.9 DIABETES MELLITUS TYPE 2 IN NONOBESE: Status: ACTIVE | Noted: 2018-02-02

## 2018-02-02 PROBLEM — E78.5 HYPERLIPIDEMIA ASSOCIATED WITH TYPE 2 DIABETES MELLITUS: Status: ACTIVE | Noted: 2018-02-02

## 2018-02-09 DIAGNOSIS — E11.9 TYPE 2 DIABETES MELLITUS WITHOUT COMPLICATION: ICD-10-CM

## 2018-02-13 ENCOUNTER — TELEPHONE (OUTPATIENT)
Dept: INTERNAL MEDICINE | Facility: CLINIC | Age: 83
End: 2018-02-13

## 2018-02-13 NOTE — TELEPHONE ENCOUNTER
----- Message from Jordana Rodriguez MD sent at 2/2/2018  6:44 PM CST -----  Please schedule CMP, lipid, a1c prior to 3 month follow up with me. Thanks!

## 2018-02-20 ENCOUNTER — OFFICE VISIT (OUTPATIENT)
Dept: INTERNAL MEDICINE | Facility: CLINIC | Age: 83
End: 2018-02-20
Payer: MEDICARE

## 2018-02-20 VITALS
SYSTOLIC BLOOD PRESSURE: 120 MMHG | WEIGHT: 207.25 LBS | BODY MASS INDEX: 29.67 KG/M2 | HEART RATE: 77 BPM | OXYGEN SATURATION: 95 % | HEIGHT: 70 IN | TEMPERATURE: 98 F | DIASTOLIC BLOOD PRESSURE: 58 MMHG

## 2018-02-20 DIAGNOSIS — R31.9 URINARY TRACT INFECTION WITH HEMATURIA, SITE UNSPECIFIED: ICD-10-CM

## 2018-02-20 DIAGNOSIS — R31.9 HEMATURIA, UNSPECIFIED TYPE: Primary | ICD-10-CM

## 2018-02-20 DIAGNOSIS — N39.0 URINARY TRACT INFECTION WITH HEMATURIA, SITE UNSPECIFIED: ICD-10-CM

## 2018-02-20 LAB
BACTERIA #/AREA URNS HPF: ABNORMAL /HPF
BILIRUB UR QL STRIP: NEGATIVE
CLARITY UR: ABNORMAL
COLOR UR: YELLOW
GLUCOSE UR QL STRIP: NEGATIVE
HGB UR QL STRIP: ABNORMAL
KETONES UR QL STRIP: NEGATIVE
LEUKOCYTE ESTERASE UR QL STRIP: ABNORMAL
MICROSCOPIC COMMENT: ABNORMAL
NITRITE UR QL STRIP: NEGATIVE
PH UR STRIP: 6 [PH] (ref 5–8)
PROT UR QL STRIP: ABNORMAL
RBC #/AREA URNS HPF: >100 /HPF (ref 0–4)
SP GR UR STRIP: 1.01 (ref 1–1.03)
SQUAMOUS #/AREA URNS HPF: 2 /HPF
URN SPEC COLLECT METH UR: ABNORMAL
WBC #/AREA URNS HPF: >100 /HPF (ref 0–5)

## 2018-02-20 PROCEDURE — 1159F MED LIST DOCD IN RCRD: CPT | Mod: S$GLB,,, | Performed by: FAMILY MEDICINE

## 2018-02-20 PROCEDURE — 3008F BODY MASS INDEX DOCD: CPT | Mod: S$GLB,,, | Performed by: FAMILY MEDICINE

## 2018-02-20 PROCEDURE — 87086 URINE CULTURE/COLONY COUNT: CPT

## 2018-02-20 PROCEDURE — 99214 OFFICE O/P EST MOD 30 MIN: CPT | Mod: S$GLB,,, | Performed by: FAMILY MEDICINE

## 2018-02-20 PROCEDURE — 99999 PR PBB SHADOW E&M-EST. PATIENT-LVL III: CPT | Mod: PBBFAC,,, | Performed by: FAMILY MEDICINE

## 2018-02-20 PROCEDURE — 1126F AMNT PAIN NOTED NONE PRSNT: CPT | Mod: S$GLB,,, | Performed by: FAMILY MEDICINE

## 2018-02-20 PROCEDURE — 81000 URINALYSIS NONAUTO W/SCOPE: CPT

## 2018-02-20 RX ORDER — CIPROFLOXACIN 250 MG/1
250 TABLET, FILM COATED ORAL 2 TIMES DAILY
Qty: 14 TABLET | Refills: 0 | Status: SHIPPED | OUTPATIENT
Start: 2018-02-20 | End: 2018-02-27

## 2018-02-20 NOTE — PATIENT INSTRUCTIONS
Blood in the Urine    Blood in the urine (hematuria) has many possible causes. If it occurs after an injury (such as a car accident or fall), it is most often a sign of bruising to the kidney or bladder. Common causes of blood in the urine include urinary tract infections, kidney stones, inflammation, tumors, or certain other diseases of the kidney or bladder. Menstruation can cause blood to appear in the urine sample, although it is not coming from the urinary tract.  If only a trace amount of blood is present, it will show up on the urine test, even though the urine may be yellow and not pink or red. This may occur with any of the above conditions, as well as heavy exercise or high fever. In this case, your doctor may want to repeat the urine test on another day. This will show if the blood is still present. If it is, then other tests can be done to find out the cause.  Home care  Follow these home care guidelines:  · If your urine does not appear bloody (pink, brown or red) then you do not need to restrict your activity in any way.  · If you can see blood in your urine, rest and avoid heavy exertion until your next exam. Do not use aspirin, blood thinners, or anti-platelet or anti-inflammatory medicines. These include ibuprofen and naproxen. These thin the blood and may increase bleeding.  Follow-up care  Follow up with your healthcare provider, or as advised. If you were injured and had blood in your urine, you should have a repeat urine test in 1 to 2 days. Contact your doctor for this test.  A radiologist will review any X-rays that were taken. You will be told of any new findings that may affect your care.  When to seek medical advice  Call your healthcare provider right away if any of these occur:  · Bright red blood or blood clots in the urine (if you did not have this before)  · Weakness, dizziness or fainting  · New groin, abdominal, or back pain  · Fever of 100.4ºF (38ºC) or higher, or as directed by  your healthcare provider  · Repeated vomiting  · Bleeding from the nose or gums or easy bruising  Date Last Reviewed: 9/1/2016 © 2000-2017 [employer]. [address] [place] [address], [place] [address]. All rights reserved. This information is not intended as a substitute for professional medical care. Always follow your healthcare professional's instructions.        Urinary Tract Infections in Men    Urinary tract infections (UTIs) are most often caused by bacteria that invade the urinary tract. The bacteria may come from outside the body. Or they may travel from the skin outside of rectum into the urethra. Pain in or around the urinary tract is a common symptom for most UTIs. But the only way to know for sure if you have a UTI is to have a urinalysis and urine culture.   Types of UTIs  · Cystitis: This is a bladder infection and is often linked to a blockage from an enlarged prostate. You may have an urgent or frequent need to urinate, and bloody urine. Treatment includes antibiotics and medicine to relax or shrink the prostate. Sometimes, surgery is needed.  · Urethritis: This is an infection of the urethra. You may have a discharge from the urethra or burning when you urinate. You may also have pain in the urethra or penis. It is treated with antibiotics.  · Prostatitis: This is an inflammation or infection of the prostate. You may have an urgent or frequent need to urinate, fever, or burning when you urinate. Or you may have a tender prostate, or a vague feeling of pressure. Prostatitis is treated with a range of medicines, depending on the cause.  · Pyelonephritis: This is a kidney infection. If not treated, it can be serious and damage your kidneys. In severe cases you may be hospitalized. You may have a fever and upper back pain.  Treating a UTI  · Medicine: Most UTIs are treated with antibiotics. These kill the bacteria. The length of time you need to take them depends on the type of infection. Take  antibiotics exactly as directed until all of the medicine is gone. If you don't, the infection may not go away and may become harder to treat. For certain types of UTIs, you may be given other medicine to help treat your symptoms.  · Lifestyle changes: The lifestyle changes below will help get rid of your current infection. They may also help prevent future UTIs.  ¨ Drink plenty of fluids such as water, juice, or other caffeine-free drinks. This helps flush bacteria out of your system.  ¨ Empty your bladder when you feel the urge to urinate and before going to sleep. Urine that stays in your bladder promotes infection.  ¨ Use condoms during sex. These help prevent UTIs caused by sexually transmitted bacteria.  ¨ Keep follow-up appointments with your healthcare provider. He or she can may do tests to make sure the infection has cleared. If needed, more treatment can be started.  · Other treatment: Most UTIs respond to medicine. But sometimes a procedure or surgery is needed. This can treat an enlarged prostate, or remove a kidney stone or other blockage. Surgery may also treat problems caused by scarring or long-term infections.  Date Last Reviewed: 1/1/2017  © 6671-4363 [employer]. [address] [place] [address], [place] [address]. All rights reserved. This information is not intended as a substitute for professional medical care. Always follow your healthcare professional's instructions.

## 2018-02-20 NOTE — PROGRESS NOTES
Subjective:       Patient ID: Rajiv Russo is a 89 y.o. male.    Chief Complaint: Urinary Tract Infection (passed blood 1 week ago)    Patient presents to clinic today with family members reporting dysuria x 1 week, started AZO on Sunday; reports passing clots intermittently. History of bladder cancer s/p resection of tumor. History of prostate cancer with external radiation. No fever, chills.      Review of Systems   Constitutional: Negative for chills, fatigue, fever and unexpected weight change.   Eyes: Negative for visual disturbance.   Respiratory: Negative for shortness of breath.    Cardiovascular: Negative for chest pain.   Genitourinary: Positive for dysuria and hematuria.   Musculoskeletal: Negative for myalgias.   Neurological: Negative for headaches.       Objective:      Physical Exam   Constitutional: He is oriented to person, place, and time. He appears well-developed and well-nourished. No distress.   HENT:   Head: Normocephalic and atraumatic.   Eyes: Conjunctivae and EOM are normal. Pupils are equal, round, and reactive to light. No scleral icterus.   Cardiovascular: Normal rate and regular rhythm.  Exam reveals no gallop and no friction rub.    No murmur heard.  Pulmonary/Chest: Effort normal and breath sounds normal.   Neurological: He is alert and oriented to person, place, and time. No cranial nerve deficit. Gait normal.   Psychiatric: He has a normal mood and affect.   Vitals reviewed.      Assessment:       1. Hematuria, unspecified type    2. Urinary tract infection with hematuria, site unspecified        Plan:     Problem List Items Addressed This Visit     None      Visit Diagnoses     Hematuria, unspecified type    -  Primary    Relevant Orders    Urinalysis (Completed)    Urine culture (Completed)    Urinary tract infection with hematuria, site unspecified        Relevant Medications    ciprofloxacin HCl (CIPRO) 250 MG tablet        Will treat for UTI and follow up culture. Advised  to go to the ER for worsening hematuria, fever, chills, AMS or other concerning symptoms. We will reassess in 2 weeks and consider Urology consult if persistent. Due to age patient does not wish to proceed with Urology evaluation at this time.

## 2018-02-21 ENCOUNTER — PATIENT OUTREACH (OUTPATIENT)
Dept: ADMINISTRATIVE | Facility: HOSPITAL | Age: 83
End: 2018-02-21

## 2018-02-21 LAB — BACTERIA UR CULT: NO GROWTH

## 2018-02-23 ENCOUNTER — TELEPHONE (OUTPATIENT)
Dept: INTERNAL MEDICINE | Facility: CLINIC | Age: 83
End: 2018-02-23

## 2018-02-23 NOTE — TELEPHONE ENCOUNTER
Rajiv Russo's daughter Raven Darby has been notified 02/23/2018 at 10:17 AM & verbalized understanding. Patient will keep follow up visit.

## 2018-02-23 NOTE — TELEPHONE ENCOUNTER
----- Message from Jordana Rodriguez MD sent at 2/22/2018  7:33 AM CST -----  Urine culture is negative. Patient should keep planned follow up and seek medical attention for worsening bleeding.

## 2018-02-23 NOTE — TELEPHONE ENCOUNTER
----- Message from Mona Landite sent at 2/23/2018  9:45 AM CST -----  Contact: Raven (pt's daughter)  Raven called and stated she was returning a call. She can be reached at [phone].    Thanks,  TF

## 2018-02-27 DIAGNOSIS — I44.2 CHB (COMPLETE HEART BLOCK): ICD-10-CM

## 2018-02-27 DIAGNOSIS — Z95.0 CARDIAC PACEMAKER IN SITU: Primary | ICD-10-CM

## 2018-03-02 ENCOUNTER — TELEPHONE (OUTPATIENT)
Dept: INTERNAL MEDICINE | Facility: CLINIC | Age: 83
End: 2018-03-02

## 2018-03-02 NOTE — TELEPHONE ENCOUNTER
----- Message from Payton Guardado sent at 3/2/2018  8:46 AM CST -----  Contact: [facility]/Sheree  Caller request a call from the nurse because pt fell and has a skin tear to his arm and also pt needs to be re certified, please contact the caller at [phone]

## 2018-03-02 NOTE — TELEPHONE ENCOUNTER
Yes, please have them continue. Please have them monitor skin tear. Also, please have them perform fall risk assessment. If patient has fall with injury, he should be seen. Does patient have cane/walker?

## 2018-03-02 NOTE — TELEPHONE ENCOUNTER
Sheree with [facility] states the nurse went to the patients home this morning to discharge him and noticed a skin tear on his right arm. Patient states he fell a few days ago and also broke 2 toes. The nurse did clean the tear with NS and applied a foam bandage. Home health nurse is asking if home health visits need to be continued due to recent fall, please advise.

## 2018-03-02 NOTE — TELEPHONE ENCOUNTER
Sheree Banda notified and states the patient does have a walker. Advised to call the office as needed, Sheree verbalized understanding.

## 2018-03-06 RX ORDER — INSULIN GLARGINE 100 [IU]/ML
INJECTION, SOLUTION SUBCUTANEOUS
COMMUNITY
Start: 2018-01-15 | End: 2018-03-21 | Stop reason: SDUPTHER

## 2018-03-07 ENCOUNTER — TELEPHONE (OUTPATIENT)
Dept: INTERNAL MEDICINE | Facility: CLINIC | Age: 83
End: 2018-03-07

## 2018-03-07 ENCOUNTER — OFFICE VISIT (OUTPATIENT)
Dept: INTERNAL MEDICINE | Facility: CLINIC | Age: 83
End: 2018-03-07
Payer: MEDICARE

## 2018-03-07 VITALS
DIASTOLIC BLOOD PRESSURE: 60 MMHG | TEMPERATURE: 97 F | OXYGEN SATURATION: 93 % | WEIGHT: 208.56 LBS | BODY MASS INDEX: 29.86 KG/M2 | SYSTOLIC BLOOD PRESSURE: 120 MMHG | HEART RATE: 72 BPM | HEIGHT: 70 IN

## 2018-03-07 DIAGNOSIS — R31.9 HEMATURIA, UNSPECIFIED TYPE: ICD-10-CM

## 2018-03-07 DIAGNOSIS — Z85.51 HISTORY OF BLADDER CANCER: ICD-10-CM

## 2018-03-07 DIAGNOSIS — N39.0 URINARY TRACT INFECTION WITH HEMATURIA, SITE UNSPECIFIED: Primary | ICD-10-CM

## 2018-03-07 DIAGNOSIS — R31.9 URINARY TRACT INFECTION WITH HEMATURIA, SITE UNSPECIFIED: Primary | ICD-10-CM

## 2018-03-07 LAB
BACTERIA #/AREA URNS AUTO: ABNORMAL /HPF
BILIRUB UR QL STRIP: NEGATIVE
CLARITY UR REFRACT.AUTO: ABNORMAL
COLOR UR AUTO: YELLOW
GLUCOSE UR QL STRIP: ABNORMAL
HGB UR QL STRIP: ABNORMAL
HYALINE CASTS UR QL AUTO: 15 /LPF
KETONES UR QL STRIP: NEGATIVE
LEUKOCYTE ESTERASE UR QL STRIP: ABNORMAL
MICROSCOPIC COMMENT: ABNORMAL
NITRITE UR QL STRIP: NEGATIVE
PH UR STRIP: 6 [PH] (ref 5–8)
PROT UR QL STRIP: ABNORMAL
RBC #/AREA URNS AUTO: >100 /HPF (ref 0–4)
SP GR UR STRIP: 1.01 (ref 1–1.03)
URN SPEC COLLECT METH UR: ABNORMAL
UROBILINOGEN UR STRIP-ACNC: NEGATIVE EU/DL
WBC #/AREA URNS AUTO: >100 /HPF (ref 0–5)
WBC CLUMPS UR QL AUTO: ABNORMAL

## 2018-03-07 PROCEDURE — 99214 OFFICE O/P EST MOD 30 MIN: CPT | Mod: S$GLB,,, | Performed by: FAMILY MEDICINE

## 2018-03-07 PROCEDURE — 87086 URINE CULTURE/COLONY COUNT: CPT

## 2018-03-07 PROCEDURE — 99999 PR PBB SHADOW E&M-EST. PATIENT-LVL III: CPT | Mod: PBBFAC,,, | Performed by: FAMILY MEDICINE

## 2018-03-07 PROCEDURE — 81001 URINALYSIS AUTO W/SCOPE: CPT

## 2018-03-07 NOTE — TELEPHONE ENCOUNTER
----- Message from Ana Kumar sent at 3/6/2018  4:55 PM CST -----  Contact: [facility]  He is calling in regards to wanting to speak to the nurse regarding a new order for wound care supplies and can be reached at [phone]

## 2018-03-07 NOTE — TELEPHONE ENCOUNTER
----- Message from Shaun Barton sent at 3/7/2018  8:13 AM CST -----  Contact: [facility]   [facility] called will be faxing over a PCP referral to be completed and faxed back today. No call needed.

## 2018-03-07 NOTE — TELEPHONE ENCOUNTER
Left a voicemail for Francis with [employer] [facility]. Have not yet received a fax request for supplies.

## 2018-03-08 LAB — BACTERIA UR CULT: NO GROWTH

## 2018-03-12 ENCOUNTER — TELEPHONE (OUTPATIENT)
Dept: INTERNAL MEDICINE | Facility: CLINIC | Age: 83
End: 2018-03-12

## 2018-03-12 NOTE — TELEPHONE ENCOUNTER
----- Message from Meli Osorio MA sent at 3/12/2018 11:58 AM CDT -----  Spoke with patients home health care and was informed that he has been checking his blood sugars regularly.   03/11/2018 725am : 115  12pm : 177  520pm : 178       03/12/2018 730am : 109   1130am 249    Scheduled patients appointment with Urology on 05/01/2018 @ 5pm, they would like to be worked in for a sooner appointment, since I am not familiar with what exactly is going on I let them know that someone would be in touch with further information.

## 2018-03-12 NOTE — TELEPHONE ENCOUNTER
Please notify patient that I spoke with Urology department. They have rescheduled him for tomorrow, Tuesday March 13th at 4:40 pm at [facility] location with Dr. Jacobo to evaluate the hematuria.    Also, I would like for him to have a1c drawn when he is in clinic tomorrow (non-fasting lab). Thank you.

## 2018-03-13 ENCOUNTER — LAB VISIT (OUTPATIENT)
Dept: LAB | Facility: HOSPITAL | Age: 83
End: 2018-03-13
Attending: FAMILY MEDICINE
Payer: MEDICARE

## 2018-03-13 ENCOUNTER — OFFICE VISIT (OUTPATIENT)
Dept: UROLOGY | Facility: CLINIC | Age: 83
End: 2018-03-13
Payer: COMMERCIAL

## 2018-03-13 VITALS
WEIGHT: 207 LBS | HEART RATE: 74 BPM | SYSTOLIC BLOOD PRESSURE: 118 MMHG | BODY MASS INDEX: 28.98 KG/M2 | DIASTOLIC BLOOD PRESSURE: 52 MMHG | HEIGHT: 71 IN

## 2018-03-13 DIAGNOSIS — R31.9 HEMATURIA, UNSPECIFIED TYPE: Primary | ICD-10-CM

## 2018-03-13 DIAGNOSIS — E11.9 DIABETES MELLITUS TYPE 2 IN NONOBESE: ICD-10-CM

## 2018-03-13 LAB
BILIRUB SERPL-MCNC: NORMAL MG/DL
BLOOD URINE, POC: 250
COLOR, POC UA: YELLOW
ESTIMATED AVG GLUCOSE: 169 MG/DL
GLUCOSE UR QL STRIP: NORMAL
HBA1C MFR BLD HPLC: 7.5 %
KETONES UR QL STRIP: NORMAL
LEUKOCYTE ESTERASE URINE, POC: NORMAL
NITRITE, POC UA: NORMAL
PH, POC UA: 6
POC RESIDUAL URINE VOLUME: 0 ML (ref 0–100)
PROTEIN, POC: 30
SPECIFIC GRAVITY, POC UA: 1.01
UROBILINOGEN, POC UA: NORMAL

## 2018-03-13 PROCEDURE — 83036 HEMOGLOBIN GLYCOSYLATED A1C: CPT

## 2018-03-13 PROCEDURE — 36415 COLL VENOUS BLD VENIPUNCTURE: CPT

## 2018-03-13 PROCEDURE — 99204 OFFICE O/P NEW MOD 45 MIN: CPT | Mod: 25,S$GLB,, | Performed by: UROLOGY

## 2018-03-13 PROCEDURE — 51798 US URINE CAPACITY MEASURE: CPT | Mod: S$GLB,,, | Performed by: UROLOGY

## 2018-03-13 PROCEDURE — 99999 PR PBB SHADOW E&M-EST. PATIENT-LVL III: CPT | Mod: PBBFAC,,, | Performed by: UROLOGY

## 2018-03-13 PROCEDURE — 81002 URINALYSIS NONAUTO W/O SCOPE: CPT | Mod: S$GLB,,, | Performed by: UROLOGY

## 2018-03-13 NOTE — PROGRESS NOTES
Chief Complaint: Bladder Cancer?    HPI:   3/13/18: 89 yo man had bladder cancer dx 40 years ago and Dr. Andersen in [place] looked in his bladder a year or two; last time a bladder lesion was seen was 15-20 years ago.  Had XRT for prostate cancer early 80's.  Had gross hematuria recently and was sent for evaluation of that.  Has had a lot of UCx sent over last year never had a UTI.  No abd/pelvic pain and no exac/rel factors.  No urolithiasis.  No urinary bother other than frequency.  No  history.  Normal sexual function.    Allergies:  Patient has no known allergies.    Medications:  has a current medication list which includes the following prescription(s): alprazolam, aspirin, atorvastatin, bd insulin pen needle uf mini, bumetanide, cyanocobalamin, fenofibrate, glipizide, humalog kwikpen insulin, insulin glargine, lantus solostar u-100 insulin, metolazone, metoprolol succinate, nitroglycerin, ondansetron, potassium chloride sa, spiriva with handihaler, tamsulosin, and vit a/vit c/vit e/zinc/copper.    Review of Systems:  General: No fever, chills, fatigability, or weight loss.  Skin: No rashes, itching, or changes in color or texture of skin.  Chest: Denies BORGES, cyanosis, wheezing, cough, and sputum production.  Abdomen: Appetite fine. No weight loss. Denies diarrhea, abdominal pain, hematemesis, or blood in stool.  Musculoskeletal: No joint stiffness or swelling. Denies back pain.  : As above.  All other review of systems negative.    PMH:   has a past medical history of Acute coronary syndrome; Bladder cancer; Bradycardia; CHF (congestive heart failure); CKD (chronic kidney disease) stage 3, GFR 30-59 ml/min; Coronary artery disease; Diabetes mellitus; Heart attack; Hyperlipidemia; Hypertension; Macular degeneration; and Pacemaker.    PSH:   has a past surgical history that includes colon removal. ; Cardiac pacemaker placement; Bladder surgery; and Cataract extraction.    FamHx: family history includes No  Known Problems in his brother, father, maternal aunt, maternal grandfather, maternal grandmother, maternal uncle, mother, paternal aunt, paternal grandfather, paternal grandmother, paternal uncle, and sister.    SocHx:  reports that he has quit smoking. He has quit using smokeless tobacco. He reports that he does not drink alcohol or use drugs.      Physical Exam:  Vitals:    03/13/18 1642   BP: (!) 118/52   Pulse: 74     General: A&Ox3, no apparent distress, no deformities  Neck: No masses, normal thyroid  Lungs: normal inspiration, no use of accessory muscles  Heart: normal pulse, no arrhythmias  Abdomen: Soft, NT, ND, no masses, no hernias, no hepatosplenomegaly  Lymphatic: Neck and groin nodes negative  Skin: The skin is warm and dry. No jaundice.  Ext: No c/c/e.  : Test desc mehdi, no abnormalities of epididymus. Penis normal, with normal penile and scrotal skin. Meatus normal.     Labs/Studies:   Bladder Scan performed in office: PVR 0 ml.    Impression/Plan:   1. Will get a KUB/US to screen upper tract problems and then RTC for cysto.

## 2018-03-13 NOTE — LETTER
March 13, 2018      Jordana Rodriguez MD  [address] [place] [address] [address]ida VALDEZ [id number]           [facility]  [address] [place] [address] [address]  [place] [address]  Phone: [phone]  Fax: [phone]          Patient: Rajiv Russo   MR Number: [id number]   YOB: 1919   Date of Visit: 3/13/2018       Dear Dr. Jordana Rodriguez:    Thank you for referring Rajiv Russo to me for evaluation. Attached you will find relevant portions of my assessment and plan of care.    If you have questions, please do not hesitate to call me. I look forward to following Rajiv Russo along with you.    Sincerely,    John Jacobo IV, MD    Enclosure  CC:  No Recipients    If you would like to receive this communication electronically, please contact [email or web address] or [phone] to request more information on [employer] access.    For providers and/or their staff who would like to refer a patient to [facility], please contact us through our one-stop-shop provider referral line, [facility], at [phone].    If you feel you have received this communication in error or would no longer like to receive these types of communications, please e-mail [email or web address]

## 2018-03-14 NOTE — PROGRESS NOTES
Subjective:       Patient ID: Rajiv Russo is a 89 y.o. male.    Chief Complaint: Follow-up    Patient presents to clinic today with his son for follow up. He reports completing course of antibiotics. He denies gross hematuria. He fell a couple of weeks ago, abrasion to right upper extremity is healing. He reports episode of chest pain 1 week ago, took nitroglycerin x 2. Denies current chest pain.       Review of Systems   Constitutional: Negative for chills, fatigue, fever and unexpected weight change.   Eyes: Negative for visual disturbance.   Respiratory: Positive for shortness of breath (chronic, stable).    Cardiovascular: Negative for chest pain.   Musculoskeletal: Negative for myalgias.   Neurological: Negative for headaches.       Objective:      Physical Exam   Constitutional: He is oriented to person, place, and time. He appears well-developed and well-nourished. No distress.   HENT:   Head: Normocephalic and atraumatic.   Eyes: Conjunctivae and EOM are normal. Pupils are equal, round, and reactive to light. No scleral icterus.   Cardiovascular: Normal rate and regular rhythm.  Exam reveals no gallop and no friction rub.    No murmur heard.  Pulmonary/Chest: Effort normal and breath sounds normal.   Neurological: He is alert and oriented to person, place, and time.   Ambulates with walker   Psychiatric: He has a normal mood and affect.   Vitals reviewed.      Assessment:       1. Urinary tract infection with hematuria, site unspecified    2. Hematuria, unspecified type    3. History of bladder cancer        Plan:     Problem List Items Addressed This Visit     None      Visit Diagnoses     Urinary tract infection with hematuria, site unspecified    -  Primary    Relevant Orders    Urinalysis (Completed)    Urine culture (Completed)    Ambulatory referral to Urology    Hematuria, unspecified type        Relevant Orders    Ambulatory referral to Urology    History of bladder cancer        Relevant  Orders    Ambulatory referral to Urology          Advised to seek immediate medical attention for recurrent episode of chest pain. Advised to keep follow up with Cardiology.

## 2018-03-21 ENCOUNTER — TELEPHONE (OUTPATIENT)
Dept: RADIOLOGY | Facility: HOSPITAL | Age: 83
End: 2018-03-21

## 2018-03-21 ENCOUNTER — OFFICE VISIT (OUTPATIENT)
Dept: CARDIOLOGY | Facility: CLINIC | Age: 83
End: 2018-03-21
Payer: COMMERCIAL

## 2018-03-21 ENCOUNTER — CLINICAL SUPPORT (OUTPATIENT)
Dept: CARDIOLOGY | Facility: CLINIC | Age: 83
End: 2018-03-21
Payer: MEDICARE

## 2018-03-21 VITALS
WEIGHT: 212.75 LBS | DIASTOLIC BLOOD PRESSURE: 58 MMHG | HEIGHT: 71 IN | BODY MASS INDEX: 29.78 KG/M2 | SYSTOLIC BLOOD PRESSURE: 120 MMHG

## 2018-03-21 DIAGNOSIS — I50.22 CHRONIC SYSTOLIC CHF (CONGESTIVE HEART FAILURE): ICD-10-CM

## 2018-03-21 DIAGNOSIS — E78.5 HYPERLIPIDEMIA ASSOCIATED WITH TYPE 2 DIABETES MELLITUS: ICD-10-CM

## 2018-03-21 DIAGNOSIS — E11.69 HYPERLIPIDEMIA ASSOCIATED WITH TYPE 2 DIABETES MELLITUS: ICD-10-CM

## 2018-03-21 DIAGNOSIS — I48.0 PAF (PAROXYSMAL ATRIAL FIBRILLATION): Primary | ICD-10-CM

## 2018-03-21 DIAGNOSIS — I44.2 CHB (COMPLETE HEART BLOCK): ICD-10-CM

## 2018-03-21 DIAGNOSIS — Z95.0 CARDIAC PACEMAKER IN SITU: ICD-10-CM

## 2018-03-21 PROCEDURE — 93000 ELECTROCARDIOGRAM COMPLETE: CPT | Mod: S$GLB,,, | Performed by: INTERNAL MEDICINE

## 2018-03-21 PROCEDURE — 93280 PM DEVICE PROGR EVAL DUAL: CPT | Mod: S$GLB,,, | Performed by: INTERNAL MEDICINE

## 2018-03-21 PROCEDURE — 99215 OFFICE O/P EST HI 40 MIN: CPT | Mod: S$GLB,,, | Performed by: NURSE PRACTITIONER

## 2018-03-21 PROCEDURE — 99999 PR PBB SHADOW E&M-EST. PATIENT-LVL III: CPT | Mod: PBBFAC,,, | Performed by: NURSE PRACTITIONER

## 2018-03-21 RX ORDER — ATORVASTATIN CALCIUM 20 MG/1
20 TABLET, FILM COATED ORAL NIGHTLY
Qty: 90 TABLET | Refills: 3 | Status: SHIPPED | OUTPATIENT
Start: 2018-03-21 | End: 2019-03-21

## 2018-03-21 RX ORDER — METOPROLOL SUCCINATE 25 MG/1
25 TABLET, EXTENDED RELEASE ORAL DAILY
Qty: 90 TABLET | Refills: 3 | Status: SHIPPED | OUTPATIENT
Start: 2018-03-21

## 2018-03-21 RX ORDER — POTASSIUM CHLORIDE 750 MG/1
10 TABLET, EXTENDED RELEASE ORAL ONCE
Qty: 90 TABLET | Refills: 3 | Status: SHIPPED | OUTPATIENT
Start: 2018-03-21 | End: 2018-03-21

## 2018-03-21 RX ORDER — FENOFIBRATE 160 MG/1
160 TABLET ORAL DAILY
Qty: 90 TABLET | Refills: 3 | Status: SHIPPED | OUTPATIENT
Start: 2018-03-21 | End: 2018-10-25 | Stop reason: SDUPTHER

## 2018-03-21 RX ORDER — BUMETANIDE 2 MG/1
TABLET ORAL
Qty: 120 TABLET | Refills: 3 | Status: SHIPPED | OUTPATIENT
Start: 2018-03-21

## 2018-03-21 NOTE — PROGRESS NOTES
Please see completed CVIS report under card proc tab.     Continue cymbalta Pt is advised to keep braces on once he receives it. It was ordered through [facility].  Fall precautions. Pt is advised to keep braces on once he receives it. It was ordered through [facility].  Fall precautions. Continue Cymbalta as per outpatient dose. Continue Cymbalta as per outpatient dose. Continue Cymbalta as per outpatient dose. Continue Cymbalta as per outpatient dose. Will increase Flomax to 0.8 mg, continue same as out patient.   Doing better. Will increase Flomax to 0.8 mg.  Doing better. Will increase Flomax to 0.8 mg.  Doing better. Will increase Flomax to 0.8 mg.  Doing better. Will increase Flomax to 0.8 mg. Normal for race Pt is advised to keep braces on once he receives it. It was ordered through [facility].

## 2018-03-21 NOTE — PATIENT INSTRUCTIONS
Increase Bumex to twice daily for 3 days then decrease to twice daily on Monday and Wednesday     Return to clinic in 1 month with same day labs

## 2018-03-21 NOTE — PROGRESS NOTES
Subjective:   Patient ID:  Rajiv Russo is a 89 y.o. male who presents for follow up of Congestive Heart Failure and Hypertension      HPI  Patient presents to clinic for follow up and management of CHF and AS. He has PMH of PPM, PAF, h/o CKD, DM, CAD, old MI, HLP, PAF and HTN. Patient admitted in June 2017 with decompensated HF. Noted to have EF of 40% with moderate to severe AS on echo last year. Not anticoagulated due to frequent falls and risk of bleeding.  Patient complains of what sounds to be orthopnea and PND. States that he hasn't been wearing his oxygen all the time because of him needing to do yard work. Bumex decreased from BID to once daily in January to preserve renal function. Using Metolazone PRN. Hasn't taken Metolazone since earlier this month   ECG today shows A-flutter with rate of 70bpm. No CNS complaints to suggest TIA or CVA. Doing well with limiting his sodium intake    Past Medical History:   Diagnosis Date    Acute coronary syndrome     Bladder cancer     Bradycardia     CHF (congestive heart failure)     CKD (chronic kidney disease) stage 3, GFR 30-59 ml/min     Coronary artery disease     Diabetes mellitus     Heart attack     Hyperlipidemia     Hypertension     Macular degeneration     Pacemaker        Past Surgical History:   Procedure Laterality Date    BLADDER SURGERY      CARDIAC PACEMAKER PLACEMENT      CATARACT EXTRACTION      colon removal.          Social History   Substance Use Topics    Smoking status: Former Smoker    Smokeless tobacco: Former User    Alcohol use No       Family History   Problem Relation Age of Onset    No Known Problems Mother     No Known Problems Father     No Known Problems Sister     No Known Problems Brother     No Known Problems Maternal Aunt     No Known Problems Maternal Uncle     No Known Problems Paternal Aunt     No Known Problems Paternal Uncle     No Known Problems Maternal Grandmother     No Known Problems  "Maternal Grandfather     No Known Problems Paternal Grandmother     No Known Problems Paternal Grandfather     Amblyopia Neg Hx     Blindness Neg Hx     Cancer Neg Hx     Cataracts Neg Hx     Diabetes Neg Hx     Glaucoma Neg Hx     Hypertension Neg Hx     Macular degeneration Neg Hx     Retinal detachment Neg Hx     Strabismus Neg Hx     Stroke Neg Hx     Thyroid disease Neg Hx        Current Outpatient Prescriptions   Medication Sig    alprazolam (XANAX) 0.5 MG tablet Take 1 tablet (0.5 mg total) by mouth nightly as needed for Anxiety.    aspirin (ECOTRIN) 81 MG EC tablet Take 81 mg by mouth once daily.    atorvastatin (LIPITOR) 20 MG tablet Take 1 tablet (20 mg total) by mouth every evening.    bumetanide (BUMEX) 2 MG tablet Take 1 tablet twice daily x3 days then 1 tab twice daily on MW    cyanocobalamin 1,000 mcg/mL injection     fenofibrate 160 MG Tab Take 1 tablet (160 mg total) by mouth once daily.    glipiZIDE (GLUCOTROL) 2.5 MG TR24 Take 2.5 mg by mouth 2 (two) times daily with meals.     HUMALOG KWIKPEN 100 unit/mL InPn pen Take 4 Units by mouth once daily.    insulin glargine (LANTUS) 100 unit/mL injection Inject 15 Units into the skin 2 (two) times daily.     metOLazone (ZAROXOLYN) 5 MG tablet Take 1 tablet (5 mg total) by mouth daily as needed.    metoprolol succinate (TOPROL-XL) 25 MG 24 hr tablet Take 1 tablet (25 mg total) by mouth once daily.    nitroGLYCERIN (NITROSTAT) 0.4 MG SL tablet Place 1 tablet (0.4 mg total) under the tongue every 5 (five) minutes as needed for Chest pain.    potassium chloride SA (K-DUR,KLOR-CON) 10 MEQ tablet Take 1 tablet (10 mEq total) by mouth once.    tamsulosin (FLOMAX) 0.4 mg Cp24 Take 0.4 mg by mouth.    VIT A/VIT C/VIT E/ZINC/COPPER (PRESERVISION AREDS ORAL) Take 1 tablet by mouth once daily.    BD INSULIN PEN NEEDLE UF MINI 31 gauge x 3/16" Ndle     ondansetron (ZOFRAN) 4 MG tablet Take 1 tablet (4 mg total) by mouth every 6 (six) " "hours.    SPIRIVA WITH HANDIHALER 18 mcg inhalation capsule Inhale 1 capsule (18 mcg total) into the lungs once daily.     No current facility-administered medications for this visit.      Current Outpatient Prescriptions on File Prior to Visit   Medication Sig    alprazolam (XANAX) 0.5 MG tablet Take 1 tablet (0.5 mg total) by mouth nightly as needed for Anxiety.    aspirin (ECOTRIN) 81 MG EC tablet Take 81 mg by mouth once daily.    cyanocobalamin 1,000 mcg/mL injection     glipiZIDE (GLUCOTROL) 2.5 MG TR24 Take 2.5 mg by mouth 2 (two) times daily with meals.     HUMALOG KWIKPEN 100 unit/mL InPn pen Take 4 Units by mouth once daily.    insulin glargine (LANTUS) 100 unit/mL injection Inject 15 Units into the skin 2 (two) times daily.     metOLazone (ZAROXOLYN) 5 MG tablet Take 1 tablet (5 mg total) by mouth daily as needed.    nitroGLYCERIN (NITROSTAT) 0.4 MG SL tablet Place 1 tablet (0.4 mg total) under the tongue every 5 (five) minutes as needed for Chest pain.    tamsulosin (FLOMAX) 0.4 mg Cp24 Take 0.4 mg by mouth.    VIT A/VIT C/VIT E/ZINC/COPPER (PRESERVISION AREDS ORAL) Take 1 tablet by mouth once daily.    [DISCONTINUED] atorvastatin (LIPITOR) 20 MG tablet Take 1 tablet (20 mg total) by mouth every evening.    [DISCONTINUED] bumetanide (BUMEX) 2 MG tablet Take 1 tablet (2 mg total) by mouth once daily. Take one tablet twice a day for two days then resume normal once daily (Patient taking differently: Take 2 mg by mouth once daily. )    [DISCONTINUED] fenofibrate 160 MG Tab Take 1 tablet (160 mg total) by mouth once daily.    [DISCONTINUED] metoprolol succinate (TOPROL-XL) 25 MG 24 hr tablet Take 25 mg by mouth once daily.    [DISCONTINUED] potassium chloride SA (K-DUR,KLOR-CON) 10 MEQ tablet Take 10 mEq by mouth once.    BD INSULIN PEN NEEDLE UF MINI 31 gauge x 3/16" Ndle     ondansetron (ZOFRAN) 4 MG tablet Take 1 tablet (4 mg total) by mouth every 6 (six) hours.    SPIRIVA WITH " HANDIHALER 18 mcg inhalation capsule Inhale 1 capsule (18 mcg total) into the lungs once daily.    [DISCONTINUED] LANTUS SOLOSTAR U-100 INSULIN 100 unit/mL (3 mL) InPn pen      No current facility-administered medications on file prior to visit.        Review of Systems   Constitution: Negative for decreased appetite, weakness, malaise/fatigue, weight gain and weight loss.   HENT: Positive for hearing loss. Negative for nosebleeds.    Cardiovascular: Positive for dyspnea on exertion and leg swelling. Negative for chest pain, claudication, irregular heartbeat, near-syncope, orthopnea, palpitations, paroxysmal nocturnal dyspnea and syncope.   Respiratory: Positive for shortness of breath. Negative for cough, sleep disturbances due to breathing, snoring and wheezing.         Uses oxygen PRN   Hematologic/Lymphatic: Negative for bleeding problem. Does not bruise/bleed easily.   Skin: Negative for rash.   Musculoskeletal: Negative for arthritis, back pain, falls, joint pain, joint swelling, muscle cramps, muscle weakness and myalgias.   Gastrointestinal: Negative for bloating, abdominal pain, constipation, diarrhea, heartburn, nausea and vomiting.   Genitourinary: Negative for dysuria, hematuria and nocturia.   Neurological: Negative for excessive daytime sleepiness, dizziness, light-headedness, loss of balance, numbness, paresthesias and vertigo.        Unsteady gait    Psychiatric/Behavioral: Positive for memory loss.       Objective:   Physical Exam   Constitutional: He is oriented to person, place, and time. He appears well-developed and well-nourished.   Neck: Neck supple. No JVD present.   Cardiovascular: Normal rate and normal pulses.  A regularly irregular rhythm present. Exam reveals no friction rub.    Murmur heard.   Harsh midsystolic murmur is present  at the upper right sternal border radiating to the neck  Pulmonary/Chest: Effort normal. No respiratory distress. He has no wheezes. He has rales.   PPM  "pocket WNL    Abdominal: Soft. Bowel sounds are normal. He exhibits no distension.   Musculoskeletal: He exhibits edema ( +1 BLE ). He exhibits no tenderness.   Neurological: He is alert and oriented to person, place, and time.   Skin: Skin is warm and dry. No rash noted.   Psychiatric: He has a normal mood and affect. His behavior is normal.   Nursing note and vitals reviewed.    Vitals:    03/21/18 1019   BP: (!) 120/58   Weight: 96.5 kg (212 lb 11.9 oz)   Height: 5' 11" (1.803 m)     Lab Results   Component Value Date    CHOL 186 11/02/2011     Lab Results   Component Value Date    HDL 45 11/02/2011     Lab Results   Component Value Date    LDLCALC 111.0 11/02/2011     Lab Results   Component Value Date    TRIG 149 11/02/2011     Lab Results   Component Value Date    CHOLHDL 24.2 11/02/2011       Chemistry        Component Value Date/Time     12/31/2017 1041    K 3.8 12/31/2017 1041     12/31/2017 1041    CO2 30 (H) 12/31/2017 1041    BUN 33 (H) 12/31/2017 1041    CREATININE 1.7 (H) 12/31/2017 1041     (H) 12/31/2017 1041        Component Value Date/Time    CALCIUM 9.0 12/31/2017 1041    ALKPHOS 41 (L) 12/31/2017 1041    AST 16 12/31/2017 1041    ALT 7 (L) 12/31/2017 1041    BILITOT 0.8 12/31/2017 1041    ESTGFRAFRICA 38 (A) 12/31/2017 1041    EGFRNONAA 33 (A) 12/31/2017 1041          Lab Results   Component Value Date    TSH 3.57 11/01/2011     Lab Results   Component Value Date    INR 1.2 06/28/2017    INR 1.1 03/14/2017    INR 1.0 04/14/2016     Lab Results   Component Value Date    WBC 14.00 (H) 12/31/2017    HGB 11.4 (L) 12/31/2017    HCT 36.7 (L) 12/31/2017    MCV 95 12/31/2017     12/31/2017     BMP  Sodium   Date Value Ref Range Status   12/31/2017 140 136 - 145 mmol/L Final     Potassium   Date Value Ref Range Status   12/31/2017 3.8 3.5 - 5.1 mmol/L Final     Chloride   Date Value Ref Range Status   12/31/2017 100 95 - 110 mmol/L Final     CO2   Date Value Ref Range Status "   12/31/2017 30 (H) 23 - 29 mmol/L Final     BUN, Bld   Date Value Ref Range Status   12/31/2017 33 (H) 10 - 30 mg/dL Final     Creatinine   Date Value Ref Range Status   12/31/2017 1.7 (H) 0.5 - 1.4 mg/dL Final     Calcium   Date Value Ref Range Status   12/31/2017 9.0 8.7 - 10.5 mg/dL Final     Anion Gap   Date Value Ref Range Status   12/31/2017 10 8 - 16 mmol/L Final     eGFR if    Date Value Ref Range Status   12/31/2017 38 (A) >60 mL/min/1.73 m^2 Final     eGFR if non    Date Value Ref Range Status   12/31/2017 33 (A) >60 mL/min/1.73 m^2 Final     Comment:     Calculation used to obtain the estimated glomerular filtration  rate (eGFR) is the CKD-EPI equation.        CrCl cannot be calculated (Patient's most recent lab result is older than the maximum 7 days allowed.).    Assessment:     1. PAF (paroxysmal atrial fibrillation)    2. Hyperlipidemia associated with type 2 diabetes mellitus    3. Chronic systolic CHF (congestive heart failure)    Has rales and edema on exam and has been having orthopnea and PND for the last few weeks  ECG today shows A-flutter with controlled rate  No CNS complaints to suggest TIA or CVA  Stable CAD-no angina or equivalent   Not anticoagulated due to unsteady gait and falls   BP stable     Plan:   Continue current medical management.   Heart healthy diet  Take Bumex1 tablet twice daily x3 days then 1 tab twice daily on    RTC in 1 month for CHF with same day BMP   Device interrogation today

## 2018-03-22 ENCOUNTER — HOSPITAL ENCOUNTER (OUTPATIENT)
Dept: RADIOLOGY | Facility: HOSPITAL | Age: 83
Discharge: HOME | End: 2018-03-22
Attending: UROLOGY
Payer: COMMERCIAL

## 2018-03-22 DIAGNOSIS — I50.22 CHRONIC SYSTOLIC CHF (CONGESTIVE HEART FAILURE): ICD-10-CM

## 2018-03-22 DIAGNOSIS — R31.9 HEMATURIA, UNSPECIFIED TYPE: ICD-10-CM

## 2018-03-22 PROCEDURE — 74018 RADEX ABDOMEN 1 VIEW: CPT | Mod: TC,PO

## 2018-03-22 PROCEDURE — 74018 RADEX ABDOMEN 1 VIEW: CPT | Mod: 26,,, | Performed by: RADIOLOGY

## 2018-03-23 RX ORDER — CYANOCOBALAMIN 1000 UG/ML
INJECTION, SOLUTION INTRAMUSCULAR; SUBCUTANEOUS
OUTPATIENT
Start: 2018-03-23

## 2018-03-23 RX ORDER — ALPRAZOLAM 0.5 MG/1
0.5 TABLET ORAL NIGHTLY PRN
Qty: 30 TABLET | Refills: 5 | OUTPATIENT
Start: 2018-03-23

## 2018-03-27 ENCOUNTER — TELEPHONE (OUTPATIENT)
Dept: CARDIOLOGY | Facility: CLINIC | Age: 83
End: 2018-03-27

## 2018-03-27 NOTE — TELEPHONE ENCOUNTER
I have attempted without success to contact this patient by phone, no VM for me to leave a message.

## 2018-03-28 ENCOUNTER — TELEPHONE (OUTPATIENT)
Dept: INTERNAL MEDICINE | Facility: CLINIC | Age: 83
End: 2018-03-28

## 2018-03-28 ENCOUNTER — TELEPHONE (OUTPATIENT)
Dept: UROLOGY | Facility: CLINIC | Age: 83
End: 2018-03-28

## 2018-03-28 ENCOUNTER — TELEPHONE (OUTPATIENT)
Dept: CARDIOLOGY | Facility: CLINIC | Age: 83
End: 2018-03-28

## 2018-03-28 NOTE — TELEPHONE ENCOUNTER
----- Message from Jazzmine Finney sent at 3/28/2018 12:13 PM CDT -----  Contact: Raven/maty daughter   Call caller regarding questions about pt med.   [phone]

## 2018-03-28 NOTE — TELEPHONE ENCOUNTER
----- Message from Tricia Sarah sent at 3/28/2018  4:20 PM CDT -----    Pt  Daughter  iris  Returning the  Call    /[phone]// daughter [phone]

## 2018-03-28 NOTE — TELEPHONE ENCOUNTER
Jose Henderson,  This patient's most recent renal ultrasound shows a bladder mass and he is scheduled for an office cystoscopy on 4/11/18. Dr. Jacobo believes the patient will need a TURBT, but he must have clearance prior to surgery. I see that the patient recently saw you on 3/21/18 and has an extensive heart history. Do you feel that he is a candidate for surgery? Please let me know and I will inform Dr. Jacobo.   Thank you,  BIJAN Ruano

## 2018-03-28 NOTE — TELEPHONE ENCOUNTER
Spoke with patient's daughter and informed her of KUB and US results. Patient scheduled for office cystoscopy on 4/11/18 with Dr. Jacobo to evaluate bladder mass.

## 2018-03-28 NOTE — TELEPHONE ENCOUNTER
----- Message from Shanna Parada sent at 3/28/2018 10:33 AM CDT -----  Contact: daughter/Elena [phone]  States she needs to speak to the nurse regarding his test results from Thursday last week. States he's hurting when he urinates. Please call Elena at [phone]. Thank you

## 2018-03-28 NOTE — TELEPHONE ENCOUNTER
----- Message from Eduard Lopez sent at 3/28/2018  8:33 AM CDT -----  Contact: Raiza ( [facility] )   Raiza ( [facility] ) pt is requesting a call from nurse to discuss clarification  on a prescription.      Please callCarol [facility] )  [phone]  Ref # [id number]

## 2018-03-28 NOTE — TELEPHONE ENCOUNTER
----- Message from Jazzmine Finney sent at 3/28/2018 12:54 PM CDT -----  Contact: Sheree/home health   Caller states that she need to speak with the nurse today regarding research for pt. Caller states that pt research is tomorrow.   [phone]

## 2018-03-29 ENCOUNTER — TELEPHONE (OUTPATIENT)
Dept: INTERNAL MEDICINE | Facility: CLINIC | Age: 83
End: 2018-03-29

## 2018-03-29 ENCOUNTER — TELEPHONE (OUTPATIENT)
Dept: UROLOGY | Facility: CLINIC | Age: 83
End: 2018-03-29

## 2018-03-29 RX ORDER — POTASSIUM CHLORIDE 750 MG/1
10 TABLET, EXTENDED RELEASE ORAL ONCE
Qty: 90 TABLET | Refills: 3 | Status: SHIPPED | OUTPATIENT
Start: 2018-03-29 | End: 2018-03-29

## 2018-03-29 RX ORDER — POTASSIUM CHLORIDE 750 MG/1
10 TABLET, EXTENDED RELEASE ORAL ONCE
COMMUNITY
End: 2018-03-29 | Stop reason: SDUPTHER

## 2018-03-29 NOTE — TELEPHONE ENCOUNTER
----- Message from Chelsi Fragoso sent at 3/29/2018  8:56 AM CDT -----  Contact: Veronika REYES ([facility] )  Calling in regards to getting a  Order for a UA for  brurning and painful urination and please advise [phone]

## 2018-03-29 NOTE — TELEPHONE ENCOUNTER
Patient's home health nurse, Silvia, states she had to catheterize patient to collect urine sample for ua and cs because patient had just urinated and could not give a clean catch. Silvia drained 400ml of urine from patient's bladder and would like to know if Dr. Jacobo wants to order a arboleda placement due to patient's urinary retention. Please advise.

## 2018-03-29 NOTE — TELEPHONE ENCOUNTER
----- Message from Ana Finney sent at 3/29/2018  8:21 AM CDT -----  Contact: Destiny/[facility]  Destiny stated that Rx Potassium is missing the direction, Please call her at [phone] ref# [id number].    Thanks  td

## 2018-03-29 NOTE — TELEPHONE ENCOUNTER
Notified Silvia that Dr. Jacobo is not ordering arboleda insertion. He will assess patient at office visit on 4/11/18 for cystoscopy. Silvia states understanding.

## 2018-03-29 NOTE — TELEPHONE ENCOUNTER
He is clear for surgery, but at moderate to high risk. Ok to hold ASA for 5-7 days, but will be at risk for CVA given his history of A-fib

## 2018-04-02 ENCOUNTER — TELEPHONE (OUTPATIENT)
Dept: ADMINISTRATIVE | Facility: CLINIC | Age: 83
End: 2018-04-02

## 2018-04-02 NOTE — PATIENT INSTRUCTIONS
[facility] is requesting order for recert 03/29/2018 faxed to [phone].          Thanks,    Sheree     [phone]

## 2018-04-03 ENCOUNTER — TELEPHONE (OUTPATIENT)
Dept: CARDIOLOGY | Facility: CLINIC | Age: 83
End: 2018-04-03

## 2018-04-03 RX ORDER — POTASSIUM CHLORIDE 750 MG/1
10 TABLET, EXTENDED RELEASE ORAL DAILY
COMMUNITY

## 2018-04-03 NOTE — TELEPHONE ENCOUNTER
----- Message from Candy Fox sent at 4/3/2018 11:28 AM CDT -----  Contact: Cynthia/[facility]  Cynthia called to speak with the nurse; they need to clarify dosage and directions for the Potassium Chloride.    She can be contacted at [phone] Ref# [id number].    Thanks,  Candy

## 2018-04-03 NOTE — TELEPHONE ENCOUNTER
Returned call to [facility] spoke with Xin states needed clarification on medication potassium per pt's med list states Potassium chloride (KLOR-CON) 10MEQ by mouth once daily.

## 2018-04-04 ENCOUNTER — TELEPHONE (OUTPATIENT)
Dept: UROLOGY | Facility: CLINIC | Age: 83
End: 2018-04-04

## 2018-04-04 NOTE — TELEPHONE ENCOUNTER
Received patient's urine culture results from [facility]. No growth after two days. Dr. Jacobo notified.

## 2018-04-06 ENCOUNTER — TELEPHONE (OUTPATIENT)
Dept: UROLOGY | Facility: CLINIC | Age: 83
End: 2018-04-06

## 2018-04-06 NOTE — TELEPHONE ENCOUNTER
----- Message from Mona Gonzalez sent at 4/6/2018 10:36 AM CDT -----  Contact: Elena (pt's daughter)   Elena called and stated she needed to speak to the nurse. She stated that she needed to get the pt's test results. She can be reached at [phone] or [phone].    Thanks,  TF

## 2018-04-11 ENCOUNTER — OFFICE VISIT (OUTPATIENT)
Dept: UROLOGY | Facility: CLINIC | Age: 83
End: 2018-04-11
Payer: MEDICARE

## 2018-04-11 VITALS
HEART RATE: 68 BPM | WEIGHT: 203 LBS | BODY MASS INDEX: 28.42 KG/M2 | DIASTOLIC BLOOD PRESSURE: 54 MMHG | HEIGHT: 71 IN | SYSTOLIC BLOOD PRESSURE: 116 MMHG

## 2018-04-11 DIAGNOSIS — R31.9 HEMATURIA, UNSPECIFIED TYPE: ICD-10-CM

## 2018-04-11 DIAGNOSIS — N32.89 BLADDER MASS: Primary | ICD-10-CM

## 2018-04-11 LAB
BILIRUB SERPL-MCNC: NORMAL MG/DL
BLOOD URINE, POC: 250
COLOR, POC UA: YELLOW
GLUCOSE UR QL STRIP: NORMAL
KETONES UR QL STRIP: NORMAL
LEUKOCYTE ESTERASE URINE, POC: NORMAL
NITRITE, POC UA: NORMAL
PH, POC UA: 6
PROTEIN, POC: NORMAL
SPECIFIC GRAVITY, POC UA: 1
UROBILINOGEN, POC UA: NORMAL

## 2018-04-11 PROCEDURE — 52000 CYSTOURETHROSCOPY: CPT | Mod: S$GLB,,, | Performed by: UROLOGY

## 2018-04-11 PROCEDURE — 99999 PR PBB SHADOW E&M-EST. PATIENT-LVL IV: CPT | Mod: PBBFAC,,, | Performed by: UROLOGY

## 2018-04-11 PROCEDURE — 99499 UNLISTED E&M SERVICE: CPT | Mod: S$GLB,,, | Performed by: UROLOGY

## 2018-04-11 PROCEDURE — 81002 URINALYSIS NONAUTO W/O SCOPE: CPT | Mod: S$GLB,,, | Performed by: UROLOGY

## 2018-04-11 NOTE — PROGRESS NOTES
Chief Complaint: Bladder Cancer?    HPI:   4/1/18: US suggests a bladder mass. Cysto confirms. Likely complicaiton of prior XRT.  3/13/18: 89 yo man had bladder cancer dx 40 years ago and Dr. Andersen in [place] looked in his bladder a year or two; last time a bladder lesion was seen was 15-20 years ago.  Had XRT for prostate cancer early 80's.  Had gross hematuria recently and was sent for evaluation of that.  Has had a lot of UCx sent over last year never had a UTI.  No abd/pelvic pain and no exac/rel factors.  No urolithiasis.  No urinary bother other than frequency.  No  history.  Normal sexual function.    Allergies:  Patient has no known allergies.    Medications:  has a current medication list which includes the following prescription(s): alprazolam, aspirin, atorvastatin, bd insulin pen needle uf mini, bumetanide, cyanocobalamin, fenofibrate, glipizide, humalog kwikpen insulin, insulin glargine, metolazone, metoprolol succinate, nitroglycerin, ondansetron, potassium chloride, spiriva with handihaler, tamsulosin, and vit a/vit c/vit e/zinc/copper.    Review of Systems:  General: No fever, chills, fatigability, or weight loss.  Skin: No rashes, itching, or changes in color or texture of skin.  Chest: Denies BORGES, cyanosis, wheezing, cough, and sputum production.  Abdomen: Appetite fine. No weight loss. Denies diarrhea, abdominal pain, hematemesis, or blood in stool.  Musculoskeletal: No joint stiffness or swelling. Denies back pain.  : As above.  All other review of systems negative.    PMH:   has a past medical history of Acute coronary syndrome; Bladder cancer; Bradycardia; CHF (congestive heart failure); CKD (chronic kidney disease) stage 3, GFR 30-59 ml/min; Coronary artery disease; Diabetes mellitus; Heart attack; Hyperlipidemia; Hypertension; Macular degeneration; and Pacemaker.    PSH:   has a past surgical history that includes colon removal. ; Cardiac pacemaker placement; Bladder surgery; and Cataract  extraction.    FamHx: family history includes No Known Problems in his brother, father, maternal aunt, maternal grandfather, maternal grandmother, maternal uncle, mother, paternal aunt, paternal grandfather, paternal grandmother, paternal uncle, and sister.    SocHx:  reports that he has quit smoking. He has quit using smokeless tobacco. He reports that he does not drink alcohol or use drugs.      Physical Exam:  Vitals:    04/11/18 1256   BP: (!) 116/54   Pulse: 68     General: A&Ox3, no apparent distress, no deformities  Neck: No masses, normal thyroid  Lungs: normal inspiration, no use of accessory muscles  Heart: normal pulse, no arrhythmias  Abdomen: Soft, NT, ND, no masses, no hernias, no hepatosplenomegaly  Lymphatic: Neck and groin nodes negative  Skin: The skin is warm and dry. No jaundice.  Ext: No c/c/e.  : Test desc mehdi, no abnormalities of epididymus. Penis normal, with normal penile and scrotal skin. Meatus normal.     Labs/Studies:   Bladder Scan performed in office: PVR 0 ml.    Procedure: Diagnostic Cystoscopy    Procedure in Detail: After proper consents were obtained, the patient was prepped and draped in normal sterile fashion for diagnostic cystoscopy. 5 ml of lidocaine jelly was instilled in the urethra. The flexible cystoscope was then introduced into the urethra, and advanced into the bladder under direct vision. The urethral mucosa appeared normal, and no strictures were noted. The sphincter appeared to be normal, and the veru montanum was unremarkable. The prostatic mucosa and the lateral lobes of the prostate were unremarkable. The bladder neck was normal. Inspection of the interior of the bladder was then carried out. The trigone was unremarkable, with no mucosal lesions. The ureteral orifices were normal in position and configuration. Systematic inspection of the mucosa of the bladder it was then carried out, rotating the cystoscope so that all areas of the left and right lateral  walls, the dome of the bladder, and the posterior wall were all visualized. The cystoscope was then advanced further into the bladder, and maximum deflection of the scope was performed so that the bladder neck could be inspected. No mucosal lesions were noted there. The cystoscope was then removed, and the procedure terminated.     Findings: Apical bladder mass 5+ cm in size    Impression/Plan:   1. CT RSS and RPG at cysto during TURBT.  Risks/benefits reviewed, consents on chart.

## 2018-04-13 ENCOUNTER — TELEPHONE (OUTPATIENT)
Dept: UROLOGY | Facility: CLINIC | Age: 83
End: 2018-04-13

## 2018-04-13 ENCOUNTER — HOSPITAL ENCOUNTER (OUTPATIENT)
Dept: RADIOLOGY | Facility: HOSPITAL | Age: 83
Discharge: HOME | End: 2018-04-13
Attending: UROLOGY
Payer: COMMERCIAL

## 2018-04-13 DIAGNOSIS — R31.9 HEMATURIA, UNSPECIFIED TYPE: ICD-10-CM

## 2018-04-13 PROCEDURE — 74176 CT ABD & PELVIS W/O CONTRAST: CPT | Mod: TC

## 2018-04-14 NOTE — TELEPHONE ENCOUNTER
----- Message from Alanna Cole sent at 3/8/2018 10:24 AM CST -----  Contact: PT Daughter / Ana   Caller states pt need a refill on B12 and Xanax.   Need medication faxed to ..  [facility] - [place] - [address]  [address]  [place] [address]  Phone: [phone] Fax: [phone]    Questions: .[phone] (home)       
3/7/18 lov  
Dr. Rodriguez had not been Rx this medications.  I will hold on refill until her review.    
Last visit with Michael   01/30/18 & 02/20/2018  
Please send xanax Rx to Dr. Sanchez, he prescribed previously. Also, find out more about the B12 Rx request. Thanks  
Home

## 2018-04-16 ENCOUNTER — TELEPHONE (OUTPATIENT)
Dept: UROLOGY | Facility: CLINIC | Age: 83
End: 2018-04-16

## 2018-04-16 ENCOUNTER — DOCUMENTATION ONLY (OUTPATIENT)
Dept: CARDIOLOGY | Facility: HOSPITAL | Age: 83
End: 2018-04-16

## 2018-04-16 ENCOUNTER — ANESTHESIA EVENT (OUTPATIENT)
Dept: SURGERY | Facility: HOSPITAL | Age: 83
End: 2018-04-16
Payer: MEDICARE

## 2018-04-16 NOTE — PRE-PROCEDURE INSTRUCTIONS
Pre op instructions reviewed with patient daughter per phone:    To confirm, Your surgeon has instructed you:  Surgery is scheduled 4/17/18 at [facility].      Please report to [facility] 1st floor main lobby by MD.   Pre admit office to call afternoon prior to surgery with final arrival time      INSTRUCTIONS IMPORTANT!!!  ¨ Do not eat, drink, or smoke after 12 midnight-including water. OK to brush teeth, no gum, candy or mints!    ¨ Take only these medicines with a small swallow of water-morning of surgery.  None  ____  Do not wear makeup, including mascara.  ____  No powder, lotions or creams to surgical area.  ____  Please remove all jewelry, including piercings and leave at home.  ____  No money or valuables needed. Please leave at home.  ____  Please bring identification and insurance information to hospital.  ____  If going home the same day, arrange for a ride home. You will not be able to   drive if Anesthesia was used.  ____  Children, under 12 years old, must remain in the waiting room with an adult.  They are not allowed in patient areas.  ____  Wear loose fitting clothing. Allow for dressings, bandages.  ____  Stop Aspirin, Ibuprofen, Motrin and Aleve at least 5-7 days before surgery, unless otherwise instructed by your doctor, or the nurse.   You MAY use Tylenol/acetaminophen until day of surgery.  ____  If you take diabetic medication, do not take am of surgery unless instructed by   Doctor.  ____ Stop taking any Fish Oil supplement or any Vitamins that contain Vitamin E at least 5 days prior to surgery.          Bathing Instructions-- The night before surgery and the morning prior to coming to the hospital:   -Do not shave the surgical area.   -Shower and wash your hair and body as usual with anti-bacterial  soap and shampoo.   -Rinse your hair and body completely.   -Use one packet of hibiclens to wash the surgical site (using your hand) gently for 5 minutes.  Do not scrub you skin  too hard.   -Do not use hibiclens on your head, face, or genitals.   -Do not wash with anti-bacterial soap after you use the hibiclens.   -Rinse your body thoroughly.   -Dry with clean, soft towel.  Do not use lotion, cream, deodorant, or powders on   the surgical site.    Use antibacterial soap in place of hibiclens if your surgery is on the head, face or genitals.         Surgical Site Infection    Prevention of surgical site infections:     -Keep incisions clean and dry.   -Do not soak/submerge incisions in water until completely healed.   -Do not apply lotions, powders, creams, or deodorants to site.   -Always make sure hands are cleaned with antibacterial soap/ alcohol-based   prior to touching the surgical site.  (This includes doctors, nurses, staff, and yourself.)    Signs and symptoms:   -Redness and pain around the area where you had surgery   -Drainage of cloudy fluid from your surgical wound   -Fever over 100.4  I have read or had read and explained to me, and understand the above information.

## 2018-04-16 NOTE — TELEPHONE ENCOUNTER
----- Message from Jazzmine Finney sent at 4/16/2018 10:30 AM CDT -----  Contact: [facility]/home health nurse   Call caller regarding blood work.   [phone]

## 2018-04-16 NOTE — TELEPHONE ENCOUNTER
..Patient was contacted and spoke to his daughter; confirmed that his surgery  is scheduled for 04/17/18 and was informed that his arrival time is 1:30 pm per MD. Reminded of surgery instructions; daughter verbalized understanding that pt was not to having anything to eat or drink after midnight.  She questioned whether or not pt could have coffee and was told that he could not.

## 2018-04-16 NOTE — TELEPHONE ENCOUNTER
Spoke to Dr. Jacobo regarding requested cardiac clearance for pending surgery.  Dr. Jacobo has spoken to Dr. Sanchez as well as Dr. Cárdenas regarding this patient and the plan is to proceed with surgery as pt was informed of his risks. Patient was told to arrive at scheduled time for surgery.

## 2018-04-16 NOTE — TELEPHONE ENCOUNTER
BIJAN Pereira, spoke with family regarding pre op labs. CBC and BMP were drawn by his home health nurse and they will be faxing results to our office.

## 2018-04-16 NOTE — PROGRESS NOTES
I was contacted by DR CAMPUZANO BECAUSE THE PATIENTB HAS A LARGE BLADDER TUMOR. BYTRAVIS SWEET OF CHART HE WAS SEEN LAST MONTH IN CARDIOLOGY BY MID LEVEL AND APPEARS TO BE COMPENSATED . HE HOWEVER IS VERY HIGH RISK FOR SURGERY FROM CARDIAC STANDPOINT DUE TO AGE SEVERE AORTIC STENOSIS CKD CAD AFIB/FLUTTER.  PROCEEDING WITH SURGERY WILL BE AT THE DISCRETION AND CLINICAL SCENARIO PER TREATING UROLOGIST AND PATIENT WISHES.

## 2018-04-17 ENCOUNTER — HOSPITAL ENCOUNTER (OUTPATIENT)
Facility: HOSPITAL | Age: 83
Discharge: HOME | End: 2018-04-18
Attending: UROLOGY | Admitting: UROLOGY
Payer: MEDICARE

## 2018-04-17 ENCOUNTER — PATIENT OUTREACH (OUTPATIENT)
Dept: ADMINISTRATIVE | Facility: HOSPITAL | Age: 83
End: 2018-04-17

## 2018-04-17 ENCOUNTER — ANESTHESIA (OUTPATIENT)
Dept: SURGERY | Facility: HOSPITAL | Age: 83
End: 2018-04-17
Payer: MEDICARE

## 2018-04-17 DIAGNOSIS — N32.89 BLADDER MASS: ICD-10-CM

## 2018-04-17 LAB
ABO + RH BLD: NORMAL
BLD GP AB SCN CELLS X3 SERPL QL: NORMAL
POCT GLUCOSE: 138 MG/DL (ref 70–110)
POCT GLUCOSE: 140 MG/DL (ref 70–110)
POCT GLUCOSE: 239 MG/DL (ref 70–110)

## 2018-04-17 PROCEDURE — 36415 COLL VENOUS BLD VENIPUNCTURE: CPT

## 2018-04-17 PROCEDURE — 94640 AIRWAY INHALATION TREATMENT: CPT

## 2018-04-17 PROCEDURE — 36000706: Performed by: UROLOGY

## 2018-04-17 PROCEDURE — 25000003 PHARM REV CODE 250: Performed by: NURSE ANESTHETIST, CERTIFIED REGISTERED

## 2018-04-17 PROCEDURE — 88307 TISSUE EXAM BY PATHOLOGIST: CPT | Performed by: PATHOLOGY

## 2018-04-17 PROCEDURE — 86850 RBC ANTIBODY SCREEN: CPT

## 2018-04-17 PROCEDURE — 63600175 PHARM REV CODE 636 W HCPCS: Performed by: UROLOGY

## 2018-04-17 PROCEDURE — 52240 CYSTOSCOPY AND TREATMENT: CPT | Mod: ,,, | Performed by: UROLOGY

## 2018-04-17 PROCEDURE — 36000707: Performed by: UROLOGY

## 2018-04-17 PROCEDURE — 27000221 HC OXYGEN, UP TO 24 HOURS

## 2018-04-17 PROCEDURE — 37000008 HC ANESTHESIA 1ST 15 MINUTES: Performed by: UROLOGY

## 2018-04-17 PROCEDURE — 94799 UNLISTED PULMONARY SVC/PX: CPT

## 2018-04-17 PROCEDURE — 25000242 PHARM REV CODE 250 ALT 637 W/ HCPCS: Performed by: UROLOGY

## 2018-04-17 PROCEDURE — 71000033 HC RECOVERY, INTIAL HOUR: Performed by: UROLOGY

## 2018-04-17 PROCEDURE — 37000009 HC ANESTHESIA EA ADD 15 MINS: Performed by: UROLOGY

## 2018-04-17 PROCEDURE — 63600175 PHARM REV CODE 636 W HCPCS: Performed by: NURSE ANESTHETIST, CERTIFIED REGISTERED

## 2018-04-17 PROCEDURE — 27201423 OPTIME MED/SURG SUP & DEVICES STERILE SUPPLY: Performed by: UROLOGY

## 2018-04-17 PROCEDURE — 94761 N-INVAS EAR/PLS OXIMETRY MLT: CPT

## 2018-04-17 PROCEDURE — 83036 HEMOGLOBIN GLYCOSYLATED A1C: CPT

## 2018-04-17 PROCEDURE — C1769 GUIDE WIRE: HCPCS | Performed by: UROLOGY

## 2018-04-17 PROCEDURE — 88307 TISSUE EXAM BY PATHOLOGIST: CPT | Mod: 26,,, | Performed by: PATHOLOGY

## 2018-04-17 PROCEDURE — 25000003 PHARM REV CODE 250: Performed by: UROLOGY

## 2018-04-17 RX ORDER — ATORVASTATIN CALCIUM 10 MG/1
20 TABLET, FILM COATED ORAL NIGHTLY
Status: DISCONTINUED | OUTPATIENT
Start: 2018-04-17 | End: 2018-04-18 | Stop reason: HOSPADM

## 2018-04-17 RX ORDER — METOPROLOL SUCCINATE 25 MG/1
25 TABLET, EXTENDED RELEASE ORAL NIGHTLY
Status: DISCONTINUED | OUTPATIENT
Start: 2018-04-17 | End: 2018-04-18 | Stop reason: HOSPADM

## 2018-04-17 RX ORDER — BUMETANIDE 2 MG/1
2 TABLET ORAL DAILY
Status: DISCONTINUED | OUTPATIENT
Start: 2018-04-18 | End: 2018-04-18 | Stop reason: HOSPADM

## 2018-04-17 RX ORDER — ACETAMINOPHEN 325 MG/1
650 TABLET ORAL EVERY 4 HOURS PRN
Status: DISCONTINUED | OUTPATIENT
Start: 2018-04-17 | End: 2018-04-18 | Stop reason: HOSPADM

## 2018-04-17 RX ORDER — ONDANSETRON 2 MG/ML
INJECTION INTRAMUSCULAR; INTRAVENOUS
Status: DISCONTINUED | OUTPATIENT
Start: 2018-04-17 | End: 2018-04-17

## 2018-04-17 RX ORDER — FENOFIBRATE 160 MG/1
160 TABLET ORAL DAILY
Status: DISCONTINUED | OUTPATIENT
Start: 2018-04-18 | End: 2018-04-18

## 2018-04-17 RX ORDER — CEFAZOLIN SODIUM 2 G/50ML
2 SOLUTION INTRAVENOUS
Status: DISCONTINUED | OUTPATIENT
Start: 2018-04-17 | End: 2018-04-17 | Stop reason: HOSPADM

## 2018-04-17 RX ORDER — ASPIRIN 81 MG/1
81 TABLET ORAL DAILY
Status: DISCONTINUED | OUTPATIENT
Start: 2018-04-18 | End: 2018-04-18 | Stop reason: HOSPADM

## 2018-04-17 RX ORDER — ETOMIDATE 2 MG/ML
INJECTION INTRAVENOUS
Status: DISCONTINUED | OUTPATIENT
Start: 2018-04-17 | End: 2018-04-17

## 2018-04-17 RX ORDER — DIPHENHYDRAMINE HYDROCHLORIDE 50 MG/ML
12.5 INJECTION INTRAMUSCULAR; INTRAVENOUS EVERY 4 HOURS PRN
Status: DISCONTINUED | OUTPATIENT
Start: 2018-04-17 | End: 2018-04-18 | Stop reason: HOSPADM

## 2018-04-17 RX ORDER — LIDOCAINE HYDROCHLORIDE 10 MG/ML
INJECTION INFILTRATION; PERINEURAL
Status: DISCONTINUED | OUTPATIENT
Start: 2018-04-17 | End: 2018-04-17

## 2018-04-17 RX ORDER — POTASSIUM CHLORIDE 750 MG/1
10 TABLET, EXTENDED RELEASE ORAL DAILY
Status: DISCONTINUED | OUTPATIENT
Start: 2018-04-18 | End: 2018-04-18 | Stop reason: HOSPADM

## 2018-04-17 RX ORDER — IBUPROFEN 200 MG
16 TABLET ORAL
Status: DISCONTINUED | OUTPATIENT
Start: 2018-04-17 | End: 2018-04-18 | Stop reason: HOSPADM

## 2018-04-17 RX ORDER — CEFAZOLIN SODIUM 2 G/50ML
2 SOLUTION INTRAVENOUS
Status: COMPLETED | OUTPATIENT
Start: 2018-04-17 | End: 2018-04-18

## 2018-04-17 RX ORDER — SODIUM CHLORIDE 9 MG/ML
INJECTION, SOLUTION INTRAVENOUS CONTINUOUS
Status: DISCONTINUED | OUTPATIENT
Start: 2018-04-17 | End: 2018-04-18 | Stop reason: HOSPADM

## 2018-04-17 RX ORDER — IPRATROPIUM BROMIDE 0.5 MG/2.5ML
0.5 SOLUTION RESPIRATORY (INHALATION) EVERY 6 HOURS
Status: DISCONTINUED | OUTPATIENT
Start: 2018-04-18 | End: 2018-04-17 | Stop reason: DRUGHIGH

## 2018-04-17 RX ORDER — GLIPIZIDE 2.5 MG/1
2.5 TABLET, EXTENDED RELEASE ORAL 2 TIMES DAILY WITH MEALS
Status: DISCONTINUED | OUTPATIENT
Start: 2018-04-17 | End: 2018-04-17 | Stop reason: ALTCHOICE

## 2018-04-17 RX ORDER — OXYCODONE HYDROCHLORIDE 5 MG/1
5 TABLET ORAL EVERY 4 HOURS PRN
Status: DISCONTINUED | OUTPATIENT
Start: 2018-04-17 | End: 2018-04-18 | Stop reason: HOSPADM

## 2018-04-17 RX ORDER — DOCUSATE SODIUM 100 MG/1
100 CAPSULE, LIQUID FILLED ORAL 2 TIMES DAILY
Status: DISCONTINUED | OUTPATIENT
Start: 2018-04-17 | End: 2018-04-18 | Stop reason: HOSPADM

## 2018-04-17 RX ORDER — ONDANSETRON 2 MG/ML
4 INJECTION INTRAMUSCULAR; INTRAVENOUS EVERY 8 HOURS PRN
Status: DISCONTINUED | OUTPATIENT
Start: 2018-04-17 | End: 2018-04-18 | Stop reason: HOSPADM

## 2018-04-17 RX ORDER — TIOTROPIUM BROMIDE 18 UG/1
18 CAPSULE ORAL; RESPIRATORY (INHALATION) NIGHTLY
Status: DISCONTINUED | OUTPATIENT
Start: 2018-04-17 | End: 2018-04-17 | Stop reason: RX

## 2018-04-17 RX ORDER — SODIUM CHLORIDE, SODIUM LACTATE, POTASSIUM CHLORIDE, CALCIUM CHLORIDE 600; 310; 30; 20 MG/100ML; MG/100ML; MG/100ML; MG/100ML
INJECTION, SOLUTION INTRAVENOUS CONTINUOUS PRN
Status: DISCONTINUED | OUTPATIENT
Start: 2018-04-17 | End: 2018-04-17

## 2018-04-17 RX ORDER — IPRATROPIUM BROMIDE 0.5 MG/2.5ML
0.5 SOLUTION RESPIRATORY (INHALATION) EVERY 6 HOURS
Status: DISCONTINUED | OUTPATIENT
Start: 2018-04-17 | End: 2018-04-18 | Stop reason: HOSPADM

## 2018-04-17 RX ORDER — GLYCOPYRROLATE 0.2 MG/ML
INJECTION INTRAMUSCULAR; INTRAVENOUS
Status: DISCONTINUED | OUTPATIENT
Start: 2018-04-17 | End: 2018-04-17

## 2018-04-17 RX ORDER — ROCURONIUM BROMIDE 10 MG/ML
INJECTION, SOLUTION INTRAVENOUS
Status: DISCONTINUED | OUTPATIENT
Start: 2018-04-17 | End: 2018-04-17

## 2018-04-17 RX ORDER — ALPRAZOLAM 0.5 MG/1
0.5 TABLET ORAL NIGHTLY PRN
Status: DISCONTINUED | OUTPATIENT
Start: 2018-04-17 | End: 2018-04-18 | Stop reason: HOSPADM

## 2018-04-17 RX ORDER — IBUPROFEN 200 MG
24 TABLET ORAL
Status: DISCONTINUED | OUTPATIENT
Start: 2018-04-17 | End: 2018-04-18 | Stop reason: HOSPADM

## 2018-04-17 RX ORDER — FUROSEMIDE 10 MG/ML
INJECTION INTRAMUSCULAR; INTRAVENOUS
Status: DISCONTINUED | OUTPATIENT
Start: 2018-04-17 | End: 2018-04-17

## 2018-04-17 RX ORDER — FAMOTIDINE 20 MG/1
20 TABLET, FILM COATED ORAL 2 TIMES DAILY
Status: DISCONTINUED | OUTPATIENT
Start: 2018-04-17 | End: 2018-04-18 | Stop reason: HOSPADM

## 2018-04-17 RX ORDER — NEOSTIGMINE METHYLSULFATE 1 MG/ML
INJECTION, SOLUTION INTRAVENOUS
Status: DISCONTINUED | OUTPATIENT
Start: 2018-04-17 | End: 2018-04-17

## 2018-04-17 RX ORDER — NITROGLYCERIN 0.4 MG/1
0.4 TABLET SUBLINGUAL EVERY 5 MIN PRN
Status: DISCONTINUED | OUTPATIENT
Start: 2018-04-17 | End: 2018-04-18 | Stop reason: HOSPADM

## 2018-04-17 RX ORDER — GLUCAGON 1 MG
1 KIT INJECTION
Status: DISCONTINUED | OUTPATIENT
Start: 2018-04-17 | End: 2018-04-18 | Stop reason: HOSPADM

## 2018-04-17 RX ORDER — ONDANSETRON 4 MG/1
4 TABLET, ORALLY DISINTEGRATING ORAL EVERY 6 HOURS
Status: DISCONTINUED | OUTPATIENT
Start: 2018-04-17 | End: 2018-04-18 | Stop reason: HOSPADM

## 2018-04-17 RX ORDER — INSULIN ASPART 100 [IU]/ML
1-10 INJECTION, SOLUTION INTRAVENOUS; SUBCUTANEOUS
Status: DISCONTINUED | OUTPATIENT
Start: 2018-04-17 | End: 2018-04-18 | Stop reason: HOSPADM

## 2018-04-17 RX ADMIN — ONDANSETRON 4 MG: 2 INJECTION, SOLUTION INTRAMUSCULAR; INTRAVENOUS at 03:04

## 2018-04-17 RX ADMIN — FUROSEMIDE 10 MG: 10 INJECTION, SOLUTION INTRAMUSCULAR; INTRAVENOUS at 04:04

## 2018-04-17 RX ADMIN — ATORVASTATIN CALCIUM 20 MG: 10 TABLET, FILM COATED ORAL at 10:04

## 2018-04-17 RX ADMIN — ROBINUL 0.4 MG: 0.2 INJECTION INTRAMUSCULAR; INTRAVENOUS at 03:04

## 2018-04-17 RX ADMIN — ETOMIDATE 14 MG: 2 INJECTION, SOLUTION INTRAVENOUS at 02:04

## 2018-04-17 RX ADMIN — SODIUM CHLORIDE: 0.9 INJECTION, SOLUTION INTRAVENOUS at 05:04

## 2018-04-17 RX ADMIN — ROCURONIUM BROMIDE 10 MG: 10 INJECTION, SOLUTION INTRAVENOUS at 03:04

## 2018-04-17 RX ADMIN — SODIUM CHLORIDE, SODIUM LACTATE, POTASSIUM CHLORIDE, AND CALCIUM CHLORIDE: 600; 310; 30; 20 INJECTION, SOLUTION INTRAVENOUS at 02:04

## 2018-04-17 RX ADMIN — DOCUSATE SODIUM 100 MG: 100 CAPSULE, LIQUID FILLED ORAL at 10:04

## 2018-04-17 RX ADMIN — CEFAZOLIN SODIUM 2 G: 2 SOLUTION INTRAVENOUS at 02:04

## 2018-04-17 RX ADMIN — ETOMIDATE 8 MG: 2 INJECTION, SOLUTION INTRAVENOUS at 03:04

## 2018-04-17 RX ADMIN — ONDANSETRON 4 MG: 4 TABLET, ORALLY DISINTEGRATING ORAL at 05:04

## 2018-04-17 RX ADMIN — CEFAZOLIN SODIUM 2 G: 2 SOLUTION INTRAVENOUS at 06:04

## 2018-04-17 RX ADMIN — OXYCODONE HYDROCHLORIDE 5 MG: 5 TABLET ORAL at 07:04

## 2018-04-17 RX ADMIN — ROCURONIUM BROMIDE 30 MG: 10 INJECTION, SOLUTION INTRAVENOUS at 02:04

## 2018-04-17 RX ADMIN — FAMOTIDINE 20 MG: 20 TABLET ORAL at 10:04

## 2018-04-17 RX ADMIN — IPRATROPIUM BROMIDE 0.5 MG: 0.5 SOLUTION RESPIRATORY (INHALATION) at 09:04

## 2018-04-17 RX ADMIN — LIDOCAINE HYDROCHLORIDE 50 MG: 10 INJECTION, SOLUTION INFILTRATION; PERINEURAL at 02:04

## 2018-04-17 RX ADMIN — NEOSTIGMINE METHYLSULFATE 3 MG: 1 INJECTION INTRAVENOUS at 03:04

## 2018-04-17 RX ADMIN — INSULIN ASPART 2 UNITS: 100 INJECTION, SOLUTION INTRAVENOUS; SUBCUTANEOUS at 10:04

## 2018-04-17 NOTE — SUBJECTIVE & OBJECTIVE
Past Medical History:   Diagnosis Date    Acute coronary syndrome     Bladder cancer     Bradycardia     CHF (congestive heart failure)     CKD (chronic kidney disease) stage 3, GFR 30-59 ml/min     Coronary artery disease     Diabetes mellitus     Heart attack     Hyperlipidemia     Hypertension     Macular degeneration     Pacemaker        Past Surgical History:   Procedure Laterality Date    BLADDER SURGERY      x2    CARDIAC PACEMAKER PLACEMENT      CATARACT EXTRACTION      colon removal.          Review of patient's allergies indicates:  No Known Allergies    No current facility-administered medications on file prior to encounter.      Current Outpatient Prescriptions on File Prior to Encounter   Medication Sig    alprazolam (XANAX) 0.5 MG tablet Take 1 tablet (0.5 mg total) by mouth nightly as needed for Anxiety.    aspirin (ECOTRIN) 81 MG EC tablet Take 81 mg by mouth once daily.    atorvastatin (LIPITOR) 20 MG tablet Take 1 tablet (20 mg total) by mouth every evening.    bumetanide (BUMEX) 2 MG tablet Take 1 tablet twice daily x3 days then 1 tab twice daily on MW (Patient taking differently: Take 2 mg by mouth once daily. Take 1 tablet twice daily x3 days then 1 tab twice daily on MW)    cyanocobalamin 1,000 mcg/mL injection 1,000 mcg.     fenofibrate 160 MG Tab Take 1 tablet (160 mg total) by mouth once daily.    glipiZIDE (GLUCOTROL) 2.5 MG TR24 Take 2.5 mg by mouth 2 (two) times daily with meals.     HUMALOG KWIKPEN 100 unit/mL InPn pen Take 4 Units by mouth once daily.    insulin glargine (LANTUS) 100 unit/mL injection Inject 10 Units into the skin 2 (two) times daily.     metOLazone (ZAROXOLYN) 5 MG tablet Take 1 tablet (5 mg total) by mouth daily as needed.    metoprolol succinate (TOPROL-XL) 25 MG 24 hr tablet Take 1 tablet (25 mg total) by mouth once daily. (Patient taking differently: Take 25 mg by mouth every evening. )    ondansetron (ZOFRAN) 4 MG tablet Take 1 tablet  "(4 mg total) by mouth every 6 (six) hours.    potassium chloride (KLOR-CON) 10 MEQ TbSR Take 10 mEq by mouth once daily.    SPIRIVA WITH HANDIHALER 18 mcg inhalation capsule Inhale 1 capsule (18 mcg total) into the lungs once daily. (Patient taking differently: Inhale 18 mcg into the lungs every evening. )    VIT A/VIT C/VIT E/ZINC/COPPER (PRESERVISION AREDS ORAL) Take 1 tablet by mouth 2 (two) times daily.     BD INSULIN PEN NEEDLE UF MINI 31 gauge x 3/16" Ndle     nitroGLYCERIN (NITROSTAT) 0.4 MG SL tablet Place 1 tablet (0.4 mg total) under the tongue every 5 (five) minutes as needed for Chest pain.    tamsulosin (FLOMAX) 0.4 mg Cp24 Take 0.4 mg by mouth.     Family History     Problem Relation (Age of Onset)    No Known Problems Mother, Father, Sister, Brother, Maternal Aunt, Maternal Uncle, Paternal Aunt, Paternal Uncle, Maternal Grandmother, Maternal Grandfather, Paternal Grandmother, Paternal Grandfather        Social History Main Topics    Smoking status: Former Smoker    Smokeless tobacco: Former User    Alcohol use No    Drug use: No    Sexual activity: No     Review of Systems   Constitutional: Negative for activity change, appetite change, chills, fatigue, fever and unexpected weight change.   HENT: Negative for congestion, facial swelling, rhinorrhea, sinus pressure, sneezing and sore throat.    Eyes: Negative for discharge, redness and visual disturbance.   Respiratory: Negative for apnea, cough, chest tightness, shortness of breath, wheezing and stridor.    Cardiovascular: Negative for chest pain, palpitations and leg swelling.   Gastrointestinal: Negative for abdominal distention, abdominal pain, anal bleeding, blood in stool, constipation, diarrhea, nausea and vomiting.   Genitourinary: Negative for difficulty urinating, discharge, dysuria, frequency and hematuria.   Musculoskeletal: Negative for arthralgias, back pain, gait problem, joint swelling, myalgias, neck pain and neck stiffness. "   Skin: Negative for color change and pallor.   Neurological: Negative for dizziness, tremors, seizures, syncope, facial asymmetry, speech difficulty, weakness, light-headedness, numbness and headaches.   Psychiatric/Behavioral: Negative for behavioral problems, confusion, hallucinations and suicidal ideas. The patient is not nervous/anxious.    All other systems reviewed and are negative.    Objective:     Vital Signs (Most Recent):  Temp: 98.1 °F (36.7 °C) (04/17/18 1605)  Pulse: 69 (04/17/18 1645)  Resp: 17 (04/17/18 1645)  BP: (!) 169/71 (04/17/18 1645)  SpO2: 95 % (04/17/18 1805) Vital Signs (24h Range):  Temp:  [98.1 °F (36.7 °C)-99.6 °F (37.6 °C)] 98.1 °F (36.7 °C)  Pulse:  [69-71] 69  Resp:  [11-20] 17  SpO2:  [94 %-100 %] 95 %  BP: (142-169)/(67-73) 169/71     Weight: 92.8 kg (204 lb 9.4 oz)  Body mass index is 28.53 kg/m².    Physical Exam   Constitutional: He is oriented to person, place, and time. He appears well-developed and well-nourished.   Elderly appearing    HENT:   Head: Normocephalic and atraumatic.   Eyes: Conjunctivae are normal. Pupils are equal, round, and reactive to light.   Neck: Normal range of motion. Neck supple.   Cardiovascular: Normal rate, regular rhythm, normal heart sounds and intact distal pulses.    No murmur heard.  Pulmonary/Chest: Effort normal and breath sounds normal. No respiratory distress.   Abdominal: Soft. Bowel sounds are normal. He exhibits no distension. There is no tenderness.   Genitourinary:   Genitourinary Comments: Catheter in place   Musculoskeletal: He exhibits no edema, tenderness or deformity.   Neurological: He is alert and oriented to person, place, and time.   Skin: Skin is warm and dry. No rash noted. No erythema.   Psychiatric: He has a normal mood and affect. His behavior is normal.   Nursing note and vitals reviewed.      Significant Labs: All pertinent labs within the past 24 hours have been reviewed.    Significant Imaging:   Imaging Results     None

## 2018-04-17 NOTE — HPI
Rajiv Russo is a 89 yo male with a PMH of HTN, DM, CAD, CKD, CHF who is S/P TURBT per Dr. Jacobo today. The patient tolerated the procedure well. The patient is current sitting up in bed eating dinner without complaints. Patient denies fever, chills, CP, cough, german, pnd, orthopnea, palpitations, diaphoresis, headache, blurred vision, numbness, tingling, dizziness, localized weakness, n/v/d, abdominal pain, blood in stools, melena, hematemesis, urinary frequency, urgency, dysuria or hematuria. Hospital medicine was consulted for medical management.

## 2018-04-17 NOTE — H&P (VIEW-ONLY)
Chief Complaint: Bladder Cancer?    HPI:   4/1/18: US suggests a bladder mass. Cysto confirms. Likely complicaiton of prior XRT.  3/13/18: 89 yo man had bladder cancer dx 40 years ago and Dr. Andersen in [place] looked in his bladder a year or two; last time a bladder lesion was seen was 15-20 years ago.  Had XRT for prostate cancer early 80's.  Had gross hematuria recently and was sent for evaluation of that.  Has had a lot of UCx sent over last year never had a UTI.  No abd/pelvic pain and no exac/rel factors.  No urolithiasis.  No urinary bother other than frequency.  No  history.  Normal sexual function.    Allergies:  Patient has no known allergies.    Medications:  has a current medication list which includes the following prescription(s): alprazolam, aspirin, atorvastatin, bd insulin pen needle uf mini, bumetanide, cyanocobalamin, fenofibrate, glipizide, humalog kwikpen insulin, insulin glargine, metolazone, metoprolol succinate, nitroglycerin, ondansetron, potassium chloride, spiriva with handihaler, tamsulosin, and vit a/vit c/vit e/zinc/copper.    Review of Systems:  General: No fever, chills, fatigability, or weight loss.  Skin: No rashes, itching, or changes in color or texture of skin.  Chest: Denies BORGES, cyanosis, wheezing, cough, and sputum production.  Abdomen: Appetite fine. No weight loss. Denies diarrhea, abdominal pain, hematemesis, or blood in stool.  Musculoskeletal: No joint stiffness or swelling. Denies back pain.  : As above.  All other review of systems negative.    PMH:   has a past medical history of Acute coronary syndrome; Bladder cancer; Bradycardia; CHF (congestive heart failure); CKD (chronic kidney disease) stage 3, GFR 30-59 ml/min; Coronary artery disease; Diabetes mellitus; Heart attack; Hyperlipidemia; Hypertension; Macular degeneration; and Pacemaker.    PSH:   has a past surgical history that includes colon removal. ; Cardiac pacemaker placement; Bladder surgery; and Cataract  extraction.    FamHx: family history includes No Known Problems in his brother, father, maternal aunt, maternal grandfather, maternal grandmother, maternal uncle, mother, paternal aunt, paternal grandfather, paternal grandmother, paternal uncle, and sister.    SocHx:  reports that he has quit smoking. He has quit using smokeless tobacco. He reports that he does not drink alcohol or use drugs.      Physical Exam:  Vitals:    04/11/18 1256   BP: (!) 116/54   Pulse: 68     General: A&Ox3, no apparent distress, no deformities  Neck: No masses, normal thyroid  Lungs: normal inspiration, no use of accessory muscles  Heart: normal pulse, no arrhythmias  Abdomen: Soft, NT, ND, no masses, no hernias, no hepatosplenomegaly  Lymphatic: Neck and groin nodes negative  Skin: The skin is warm and dry. No jaundice.  Ext: No c/c/e.  : Test desc mehdi, no abnormalities of epididymus. Penis normal, with normal penile and scrotal skin. Meatus normal.     Labs/Studies:   Bladder Scan performed in office: PVR 0 ml.    Procedure: Diagnostic Cystoscopy    Procedure in Detail: After proper consents were obtained, the patient was prepped and draped in normal sterile fashion for diagnostic cystoscopy. 5 ml of lidocaine jelly was instilled in the urethra. The flexible cystoscope was then introduced into the urethra, and advanced into the bladder under direct vision. The urethral mucosa appeared normal, and no strictures were noted. The sphincter appeared to be normal, and the veru montanum was unremarkable. The prostatic mucosa and the lateral lobes of the prostate were unremarkable. The bladder neck was normal. Inspection of the interior of the bladder was then carried out. The trigone was unremarkable, with no mucosal lesions. The ureteral orifices were normal in position and configuration. Systematic inspection of the mucosa of the bladder it was then carried out, rotating the cystoscope so that all areas of the left and right lateral  walls, the dome of the bladder, and the posterior wall were all visualized. The cystoscope was then advanced further into the bladder, and maximum deflection of the scope was performed so that the bladder neck could be inspected. No mucosal lesions were noted there. The cystoscope was then removed, and the procedure terminated.     Findings: Apical bladder mass 5+ cm in size    Impression/Plan:   1. CT RSS and RPG at cysto during TURBT.  Risks/benefits reviewed, consents on chart.

## 2018-04-17 NOTE — INTERVAL H&P NOTE
The patient has been examined and the H&P has been reviewed:    I concur with the findings and no changes have occurred since H&P was written.    Anesthesia/Surgery risks, benefits and alternative options discussed and understood by patient/family.          Active Hospital Problems    Diagnosis  POA    Bladder mass [N32.89]  Yes      Resolved Hospital Problems    Diagnosis Date Resolved POA   No resolved problems to display.

## 2018-04-17 NOTE — TRANSFER OF CARE
"Anesthesia Transfer of Care Note    Patient: Rajiv Russo    Procedure(s) Performed: Procedure(s) (LRB):  EXCISION-BLADDER TUMOR-TRANSURETHRAL (TURBT) (N/A)    Patient location: PACU    Anesthesia Type: general    Transport from OR: Transported from OR on room air with adequate spontaneous ventilation    Post pain: adequate analgesia    Post assessment: no apparent anesthetic complications    Post vital signs: stable    Level of consciousness: awake, alert and oriented    Nausea/Vomiting: no nausea/vomiting    Complications: none    Transfer of care protocol was followed      Last vitals:   Visit Vitals  BP (!) 147/68 (BP Location: Right arm, Patient Position: Lying)   Pulse 71   Temp 37.6 °C (99.6 °F) (Skin)   Resp 20   Ht 5' 11" (1.803 m)   Wt 92.8 kg (204 lb 9.4 oz)   SpO2 (!) 94%   BMI 28.53 kg/m²     "

## 2018-04-17 NOTE — PLAN OF CARE
Problem: Patient Care Overview  Goal: Plan of Care Review  Outcome: Ongoing (interventions implemented as appropriate)  Fall precautions maintained. Pt free from falls/injuries.  Patient denies any complaints of pain.  Antibiotics given as prescribed.  Ambulates and repositions with assist.  Telemetry monitor.  Accucheck done, coverage given as needed.  Plan of care and medications discussed with patient.  Patient verbalized understanding.  Bed locked and low, call bell within reach.  Chart check done. Will continue to monitor.

## 2018-04-17 NOTE — ANESTHESIA PREPROCEDURE EVALUATION
04/17/2018  Rajiv uRsso is a 89 y.o., male.    Anesthesia Evaluation    I have reviewed the Patient Summary Reports.        Review of Systems  Cardiovascular:   Hypertension CAD   CHF (systolic) NYHA Classification III ECG has been reviewed. MPI   1. The perfusion scan is free of evidence for myocardial ischemia.   2. There is a moderate to large size fixed defect of moderate intensity that extends from the apical to the distal inferior wall of the left ventricle, consistent with myocardial injury.   3. There is abnormal wall motion at rest showing severe hypokinesis of the inferobasilar wall of the left ventricle.   4. There is resting LV dysfunction with a reduced ejection fraction of 33 %.   5. The ventricular volumes are normal at rest and stress.   6. The extracardiac distribution of radioactivity is normal.     1 - Moderate left atrial enlargement.     2 - Concentric remodeling.     3 - Mildly to moderately depressed left ventricular systolic function (EF 40-45%).     4 - Normal right ventricular systolic function .     5 - The estimated PA systolic pressure is greater than 26 mmHg.     6 - Moderate to severe aortic stenosis, KASSIDY = 0.8 cm2, mean gradient = 18.0 mmHg.     7 - Trivial aortic regurgitation.     8 - Moderate left atrial enlargement.     9 - Concentric remodeling.     10 - Mildly to moderately depressed left ventricular systolic function (EF 40-45%).     11 - Normal right ventricular systolic function .     12 - The estimated PA systolic pressure is greater than 26 mmHg.     13 - Moderate to severe aortic stenosis, KASSIDY = 0.8 cm2, mean gradient = 18.0 mmHg.     14 - Trivial aortic regurgitation.    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), moderate    Renal/:   Chronic Renal Disease (stage 3 ckd), CRI    Endocrine:   Diabetes    Outside Labs reviewed: Hgb 12        Anesthesia Plan  Type of  Anesthesia, risks & benefits discussed:  Anesthesia Type:  general  Patient's Preference:   Intra-op Monitoring Plan: arterial line, standard ASA monitors and central line  Intra-op Monitoring Plan Comments:   Post Op Pain Control Plan: multimodal analgesia  Post Op Pain Control Plan Comments:   Induction:   IV  Beta Blocker:  Patient is on a Beta-Blocker and has received one dose within the past 24 hours (No further documentation required).       Informed Consent: Patient understands risks and agrees with Anesthesia plan.  Questions answered.   ASA Score: 4     Day of Surgery Review of History & Physical: I have interviewed and examined the patient. I have reviewed the patient's H&P dated:  There are no significant changes.      Anesthesia Plan Notes: Multidisciplinary discussion w/ surgeon & patient about high risk of prolonged intubation.  Patient aware & showed understanding via teachback method     1) Type & Screen  2) preinduction arterial line  3) Defibrillator pads  4) Large Bore IV Access   5) Etomidate Induction  6) Central Venous catheter & Levophed PRN hypotension        Ready For Surgery From Anesthesia Perspective.

## 2018-04-17 NOTE — OP NOTE
Date of Procedure: 04/17/2018    PREOP DIAGNOSIS:  Bladder mass.    POSTOP DIAGNOSIS:  Bladder mass.    PROCEDURES:      1. TURBT -- large.    2. Urethral dilation with Heynman Dilators over wire    SURGEON:  Anil Jacobo IV, M.D.    Assistants: None    Specimen: None    Prosthetics, Devices, Grafts: None    ANESTHESIA:  General endotracheal.    FINDINGS:  The patient had a wide sessile lesion of the posterior bladder over 8 cm in diameter with polypoid necrotic features on top.  His urethra was approximately 16Fr so it was necessary to dilate the urethra serially to 24Fr.      PROCEDURE IN DETAIL:  After proper consents were obtained, the patient was brought to the Operating Room where he was prepped and draped in normal sterile fashion and provided with general endotracheal anesthesia in the dorsal lithotomy position.  The Erbe resectoscope was then assembled and introduced under vision with the visual obturator but could only be advanced a few cm due to small bore urethra.  A cystoscope was used to place an Amplatz wire and then the urethra was serially dilated from 14 to 24Fr in normal fashion.  The Erbe was then passed easily to the bladder.  Then the Cohen mechanism was passed for loop cautery. The lesion was resected in its entirety and then cauterized in a normal fashion.  The bladder was then rinsed and drained after excellent hemostasis was observed and the resectoscope was set aside.      A 20-[place] Zarate catheter was placed in the bladder with 10 mL sterile water in its balloon.  The patient tolerated all of this well and there were no complications.  he was awakened, transferred to [facility] and sent to Recovery in stable condition.    BLOOD LOSS:  None.    BLOOD GIVEN:  None.    URINE OUTPUT:  None.    DRAINS:  A 20-[place] Zarate plug with mitomycin in the bladder.    DISCHARGE DISPOSITION:  Home tomorrow    FOLLOWUP PLAN:  Return to clinic in approximately 2 weeks.  he was given prescriptions for  his pain medicines, stool softeners and instructions for care at home.

## 2018-04-17 NOTE — CONSULTS
[facility] Hospital Medicine  Consult Note    Patient Name: Rajiv Russo  MRN: [id number]  Admission Date: 4/17/2018  Hospital Length of Stay: 0 days  Attending Physician: Barbara Campuzano IV, MD   Primary Care Provider: Jordana Rodriguez MD           Patient information was obtained from patient and ER records.     Inpatient consult to Internal Medicine  Consult performed by: ULISSES LEMON  Consult ordered by: BARBARA CAMPUZANO IV        Subjective:     Principal Problem: Bladder mass    Chief Complaint: No chief complaint on file.       HPI: Rajiv Russo is a 89 yo male with a PMH of HTN, DM, CAD, CKD, CHF who is S/P TURBT per Dr. Campuzano today. The patient tolerated the procedure well. The patient is current sitting up in bed eating dinner without complaints. Patient denies fever, chills, CP, cough, german, pnd, orthopnea, palpitations, diaphoresis, headache, blurred vision, numbness, tingling, dizziness, localized weakness, n/v/d, abdominal pain, blood in stools, melena, hematemesis, urinary frequency, urgency, dysuria or hematuria. Hospital medicine was consulted for medical management.         Past Medical History:   Diagnosis Date    Acute coronary syndrome     Bladder cancer     Bradycardia     CHF (congestive heart failure)     CKD (chronic kidney disease) stage 3, GFR 30-59 ml/min     Coronary artery disease     Diabetes mellitus     Heart attack     Hyperlipidemia     Hypertension     Macular degeneration     Pacemaker        Past Surgical History:   Procedure Laterality Date    BLADDER SURGERY      x2    CARDIAC PACEMAKER PLACEMENT      CATARACT EXTRACTION      colon removal.          Review of patient's allergies indicates:  No Known Allergies    No current facility-administered medications on file prior to encounter.      Current Outpatient Prescriptions on File Prior to Encounter   Medication Sig    alprazolam (XANAX) 0.5 MG tablet Take 1 tablet (0.5 mg  "total) by mouth nightly as needed for Anxiety.    aspirin (ECOTRIN) 81 MG EC tablet Take 81 mg by mouth once daily.    atorvastatin (LIPITOR) 20 MG tablet Take 1 tablet (20 mg total) by mouth every evening.    bumetanide (BUMEX) 2 MG tablet Take 1 tablet twice daily x3 days then 1 tab twice daily on MW (Patient taking differently: Take 2 mg by mouth once daily. Take 1 tablet twice daily x3 days then 1 tab twice daily on MW)    cyanocobalamin 1,000 mcg/mL injection 1,000 mcg.     fenofibrate 160 MG Tab Take 1 tablet (160 mg total) by mouth once daily.    glipiZIDE (GLUCOTROL) 2.5 MG TR24 Take 2.5 mg by mouth 2 (two) times daily with meals.     HUMALOG KWIKPEN 100 unit/mL InPn pen Take 4 Units by mouth once daily.    insulin glargine (LANTUS) 100 unit/mL injection Inject 10 Units into the skin 2 (two) times daily.     metOLazone (ZAROXOLYN) 5 MG tablet Take 1 tablet (5 mg total) by mouth daily as needed.    metoprolol succinate (TOPROL-XL) 25 MG 24 hr tablet Take 1 tablet (25 mg total) by mouth once daily. (Patient taking differently: Take 25 mg by mouth every evening. )    ondansetron (ZOFRAN) 4 MG tablet Take 1 tablet (4 mg total) by mouth every 6 (six) hours.    potassium chloride (KLOR-CON) 10 MEQ TbSR Take 10 mEq by mouth once daily.    SPIRIVA WITH HANDIHALER 18 mcg inhalation capsule Inhale 1 capsule (18 mcg total) into the lungs once daily. (Patient taking differently: Inhale 18 mcg into the lungs every evening. )    VIT A/VIT C/VIT E/ZINC/COPPER (PRESERVISION AREDS ORAL) Take 1 tablet by mouth 2 (two) times daily.     BD INSULIN PEN NEEDLE UF MINI 31 gauge x 3/16" Ndle     nitroGLYCERIN (NITROSTAT) 0.4 MG SL tablet Place 1 tablet (0.4 mg total) under the tongue every 5 (five) minutes as needed for Chest pain.    tamsulosin (FLOMAX) 0.4 mg Cp24 Take 0.4 mg by mouth.     Family History     Problem Relation (Age of Onset)    No Known Problems Mother, Father, Sister, Brother, Maternal Aunt, " Maternal Uncle, Paternal Aunt, Paternal Uncle, Maternal Grandmother, Maternal Grandfather, Paternal Grandmother, Paternal Grandfather        Social History Main Topics    Smoking status: Former Smoker    Smokeless tobacco: Former User    Alcohol use No    Drug use: No    Sexual activity: No     Review of Systems   Constitutional: Negative for activity change, appetite change, chills, fatigue, fever and unexpected weight change.   HENT: Negative for congestion, facial swelling, rhinorrhea, sinus pressure, sneezing and sore throat.    Eyes: Negative for discharge, redness and visual disturbance.   Respiratory: Negative for apnea, cough, chest tightness, shortness of breath, wheezing and stridor.    Cardiovascular: Negative for chest pain, palpitations and leg swelling.   Gastrointestinal: Negative for abdominal distention, abdominal pain, anal bleeding, blood in stool, constipation, diarrhea, nausea and vomiting.   Genitourinary: Negative for difficulty urinating, discharge, dysuria, frequency and hematuria.   Musculoskeletal: Negative for arthralgias, back pain, gait problem, joint swelling, myalgias, neck pain and neck stiffness.   Skin: Negative for color change and pallor.   Neurological: Negative for dizziness, tremors, seizures, syncope, facial asymmetry, speech difficulty, weakness, light-headedness, numbness and headaches.   Psychiatric/Behavioral: Negative for behavioral problems, confusion, hallucinations and suicidal ideas. The patient is not nervous/anxious.    All other systems reviewed and are negative.    Objective:     Vital Signs (Most Recent):  Temp: 98.1 °F (36.7 °C) (04/17/18 1605)  Pulse: 69 (04/17/18 1645)  Resp: 17 (04/17/18 1645)  BP: (!) 169/71 (04/17/18 1645)  SpO2: 95 % (04/17/18 1805) Vital Signs (24h Range):  Temp:  [98.1 °F (36.7 °C)-99.6 °F (37.6 °C)] 98.1 °F (36.7 °C)  Pulse:  [69-71] 69  Resp:  [11-20] 17  SpO2:  [94 %-100 %] 95 %  BP: (142-169)/(67-73) 169/71     Weight: 92.8 kg  (204 lb 9.4 oz)  Body mass index is 28.53 kg/m².    Physical Exam   Constitutional: He is oriented to person, place, and time. He appears well-developed and well-nourished.   Elderly appearing    HENT:   Head: Normocephalic and atraumatic.   Eyes: Conjunctivae are normal. Pupils are equal, round, and reactive to light.   Neck: Normal range of motion. Neck supple.   Cardiovascular: Normal rate, regular rhythm, normal heart sounds and intact distal pulses.    No murmur heard.  Pulmonary/Chest: Effort normal and breath sounds normal. No respiratory distress.   Abdominal: Soft. Bowel sounds are normal. He exhibits no distension. There is no tenderness.   Genitourinary:   Genitourinary Comments: Catheter in place   Musculoskeletal: He exhibits no edema, tenderness or deformity.   Neurological: He is alert and oriented to person, place, and time.   Skin: Skin is warm and dry. No rash noted. No erythema.   Psychiatric: He has a normal mood and affect. His behavior is normal.   Nursing note and vitals reviewed.      Significant Labs: All pertinent labs within the past 24 hours have been reviewed.    Significant Imaging:   Imaging Results    None            Assessment/Plan:     * Bladder mass    - Per primary team          Chronic systolic CHF (congestive heart failure), NYHA class 3    - resume metoprolol   - monitor Intake and output  - daily weight           CKD (chronic kidney disease) stage 3, GFR 30-59 ml/min    - monitor renal function   - BMP in am           HTN (hypertension)    - monitor VS   - resume home meds           DM type 2, uncontrolled, with renal complications    - SSI   - check A1C          CAD (coronary artery disease)    - resume home meds             VTE Risk Mitigation         Ordered     IP VTE HIGH RISK PATIENT  Once      04/17/18 1702     Place sequential compression device  Until discontinued      04/17/18 1702              Thank you for your consult. I will follow-up with patient. Please contact  us if you have any additional questions.    John Medina NP  Department of Hospital Medicine   [facility]

## 2018-04-18 ENCOUNTER — TELEPHONE (OUTPATIENT)
Dept: UROLOGY | Facility: CLINIC | Age: 83
End: 2018-04-18

## 2018-04-18 VITALS
WEIGHT: 208.56 LBS | DIASTOLIC BLOOD PRESSURE: 53 MMHG | TEMPERATURE: 99 F | SYSTOLIC BLOOD PRESSURE: 100 MMHG | OXYGEN SATURATION: 94 % | BODY MASS INDEX: 29.2 KG/M2 | RESPIRATION RATE: 18 BRPM | HEART RATE: 72 BPM | HEIGHT: 71 IN

## 2018-04-18 LAB
ANION GAP SERPL CALC-SCNC: 8 MMOL/L
BASOPHILS # BLD AUTO: 0 K/UL
BASOPHILS NFR BLD: 0 %
BUN SERPL-MCNC: 28 MG/DL
CALCIUM SERPL-MCNC: 8.5 MG/DL
CHLORIDE SERPL-SCNC: 99 MMOL/L
CO2 SERPL-SCNC: 30 MMOL/L
CREAT SERPL-MCNC: 1.6 MG/DL
DIFFERENTIAL METHOD: ABNORMAL
EOSINOPHIL # BLD AUTO: 0.1 K/UL
EOSINOPHIL NFR BLD: 1.3 %
ERYTHROCYTE [DISTWIDTH] IN BLOOD BY AUTOMATED COUNT: 15.2 %
EST. GFR  (AFRICAN AMERICAN): 41 ML/MIN/1.73 M^2
EST. GFR  (NON AFRICAN AMERICAN): 35 ML/MIN/1.73 M^2
ESTIMATED AVG GLUCOSE: 163 MG/DL
GLUCOSE SERPL-MCNC: 195 MG/DL
HBA1C MFR BLD HPLC: 7.3 %
HCT VFR BLD AUTO: 36.5 %
HGB BLD-MCNC: 11.2 G/DL
LYMPHOCYTES # BLD AUTO: 1 K/UL
LYMPHOCYTES NFR BLD: 8.8 %
MAGNESIUM SERPL-MCNC: 2 MG/DL
MCH RBC QN AUTO: 27.4 PG
MCHC RBC AUTO-ENTMCNC: 30.7 G/DL
MCV RBC AUTO: 89 FL
MONOCYTES # BLD AUTO: 0.8 K/UL
MONOCYTES NFR BLD: 6.9 %
NEUTROPHILS # BLD AUTO: 9.2 K/UL
NEUTROPHILS NFR BLD: 83 %
PHOSPHATE SERPL-MCNC: 2.6 MG/DL
PLATELET # BLD AUTO: 191 K/UL
PMV BLD AUTO: 9.6 FL
POCT GLUCOSE: 195 MG/DL (ref 70–110)
POTASSIUM SERPL-SCNC: 3.7 MMOL/L
RBC # BLD AUTO: 4.09 M/UL
SODIUM SERPL-SCNC: 137 MMOL/L
WBC # BLD AUTO: 11.05 K/UL

## 2018-04-18 PROCEDURE — 85025 COMPLETE CBC W/AUTO DIFF WBC: CPT

## 2018-04-18 PROCEDURE — 94640 AIRWAY INHALATION TREATMENT: CPT

## 2018-04-18 PROCEDURE — 83735 ASSAY OF MAGNESIUM: CPT

## 2018-04-18 PROCEDURE — 36415 COLL VENOUS BLD VENIPUNCTURE: CPT

## 2018-04-18 PROCEDURE — 25000003 PHARM REV CODE 250: Performed by: UROLOGY

## 2018-04-18 PROCEDURE — 94799 UNLISTED PULMONARY SVC/PX: CPT

## 2018-04-18 PROCEDURE — 63600175 PHARM REV CODE 636 W HCPCS: Performed by: UROLOGY

## 2018-04-18 PROCEDURE — 25000242 PHARM REV CODE 250 ALT 637 W/ HCPCS: Performed by: UROLOGY

## 2018-04-18 PROCEDURE — 96372 THER/PROPH/DIAG INJ SC/IM: CPT

## 2018-04-18 PROCEDURE — 84100 ASSAY OF PHOSPHORUS: CPT

## 2018-04-18 PROCEDURE — 80048 BASIC METABOLIC PNL TOTAL CA: CPT

## 2018-04-18 PROCEDURE — 27000221 HC OXYGEN, UP TO 24 HOURS

## 2018-04-18 RX ORDER — CIPROFLOXACIN 500 MG/1
500 TABLET ORAL EVERY 12 HOURS
Qty: 6 TABLET | Refills: 0 | Status: SHIPPED | OUTPATIENT
Start: 2018-04-18 | End: 2018-04-21

## 2018-04-18 RX ORDER — OXYCODONE AND ACETAMINOPHEN 5; 325 MG/1; MG/1
1-2 TABLET ORAL EVERY 4 HOURS PRN
Qty: 30 TABLET | Refills: 0 | Status: SHIPPED | OUTPATIENT
Start: 2018-04-18 | End: 2018-05-10 | Stop reason: SDUPTHER

## 2018-04-18 RX ORDER — DOCUSATE SODIUM 100 MG/1
100 CAPSULE, LIQUID FILLED ORAL 2 TIMES DAILY
Qty: 60 CAPSULE | Refills: 0 | Status: SHIPPED | OUTPATIENT
Start: 2018-04-18

## 2018-04-18 RX ADMIN — SODIUM CHLORIDE: 0.9 INJECTION, SOLUTION INTRAVENOUS at 02:04

## 2018-04-18 RX ADMIN — BUMETANIDE 2 MG: 1 TABLET ORAL at 08:04

## 2018-04-18 RX ADMIN — FAMOTIDINE 20 MG: 20 TABLET ORAL at 08:04

## 2018-04-18 RX ADMIN — DOCUSATE SODIUM 100 MG: 100 CAPSULE, LIQUID FILLED ORAL at 08:04

## 2018-04-18 RX ADMIN — IPRATROPIUM BROMIDE 0.5 MG: 0.5 SOLUTION RESPIRATORY (INHALATION) at 07:04

## 2018-04-18 RX ADMIN — POTASSIUM CHLORIDE 10 MEQ: 750 TABLET, EXTENDED RELEASE ORAL at 08:04

## 2018-04-18 RX ADMIN — INSULIN ASPART 1 UNITS: 100 INJECTION, SOLUTION INTRAVENOUS; SUBCUTANEOUS at 05:04

## 2018-04-18 RX ADMIN — CEFAZOLIN SODIUM 2 G: 2 SOLUTION INTRAVENOUS at 01:04

## 2018-04-18 RX ADMIN — CEFAZOLIN SODIUM 2 G: 2 SOLUTION INTRAVENOUS at 05:04

## 2018-04-18 RX ADMIN — ASPIRIN 81 MG: 81 TABLET, COATED ORAL at 08:04

## 2018-04-18 RX ADMIN — OXYCODONE HYDROCHLORIDE 5 MG: 5 TABLET ORAL at 04:04

## 2018-04-18 NOTE — PLAN OF CARE
Problem: Patient Care Overview  Goal: Plan of Care Review  Outcome: Ongoing (interventions implemented as appropriate)  Urinary catheter in place. IVF/IV abx given per MD order. Cardiac monitored ( paced 60s-70s), Blood glucose monitored, Pt remains free of injury, pain managed adequately, no s/s of distress. 24 hour chart check completed. Will continue to monitor.

## 2018-04-18 NOTE — SUBJECTIVE & OBJECTIVE
Interval History: No issues overnight. Plan for discharge home today per primary team.    Review of Systems   Constitutional: Negative for activity change, appetite change, chills, fatigue, fever and unexpected weight change.   HENT: Negative for congestion, facial swelling, rhinorrhea, sinus pressure, sneezing and sore throat.    Eyes: Negative for discharge, redness and visual disturbance.   Respiratory: Negative for apnea, cough, chest tightness, shortness of breath, wheezing and stridor.    Cardiovascular: Negative for chest pain, palpitations and leg swelling.   Gastrointestinal: Negative for abdominal distention, abdominal pain, anal bleeding, blood in stool, constipation, diarrhea, nausea and vomiting.   Genitourinary: Negative for difficulty urinating, discharge, dysuria, frequency and hematuria.   Musculoskeletal: Negative for arthralgias, back pain, gait problem, joint swelling, myalgias, neck pain and neck stiffness.   Skin: Negative for color change and pallor.   Neurological: Negative for dizziness, tremors, seizures, syncope, facial asymmetry, speech difficulty, weakness, light-headedness, numbness and headaches.   Psychiatric/Behavioral: Negative for behavioral problems, confusion, hallucinations and suicidal ideas. The patient is not nervous/anxious.    All other systems reviewed and are negative.    Objective:     Vital Signs (Most Recent):  Temp: 98.5 °F (36.9 °C) (04/18/18 0844)  Pulse: 72 (04/18/18 0844)  Resp: 18 (04/18/18 0844)  BP: (!) 100/53 (04/18/18 0844)  SpO2: (!) 94 % (04/18/18 0844) Vital Signs (24h Range):  Temp:  [98 °F (36.7 °C)-99.7 °F (37.6 °C)] 98.5 °F (36.9 °C)  Pulse:  [68-72] 72  Resp:  [11-18] 18  SpO2:  [91 %-100 %] 94 %  BP: (100-169)/(52-73) 100/53     Weight: 94.6 kg (208 lb 8.9 oz)  Body mass index is 29.09 kg/m².    Intake/Output Summary (Last 24 hours) at 04/18/18 1522  Last data filed at 04/18/18 0530   Gross per 24 hour   Intake          2316.67 ml   Output              1005 ml   Net          1311.67 ml      Physical Exam   Constitutional: He is oriented to person, place, and time. He appears well-developed and well-nourished.   Elderly appearing    HENT:   Head: Normocephalic and atraumatic.   Eyes: Conjunctivae are normal. Pupils are equal, round, and reactive to light.   Neck: Normal range of motion. Neck supple.   Cardiovascular: Normal rate, regular rhythm, normal heart sounds and intact distal pulses.    No murmur heard.  Pulmonary/Chest: Effort normal and breath sounds normal. No respiratory distress.   Abdominal: Soft. Bowel sounds are normal. He exhibits no distension. There is no tenderness.   Genitourinary:   Genitourinary Comments: Catheter in place   Musculoskeletal: He exhibits no edema, tenderness or deformity.   Neurological: He is alert and oriented to person, place, and time.   Skin: Skin is warm and dry. No rash noted. No erythema.   Psychiatric: He has a normal mood and affect. His behavior is normal.   Nursing note and vitals reviewed.      Significant Labs: All pertinent labs within the past 24 hours have been reviewed.    Significant Imaging:   Imaging Results    None

## 2018-04-18 NOTE — PROGRESS NOTES
[facility] Hospital Medicine  Progress Note    Patient Name: Rajiv Russo  MRN: [id number]  Patient Class: OP- Outpatient Recovery   Admission Date: 4/17/2018  Length of Stay: 0 days  Attending Physician: No att. providers found  Primary Care Provider: Jordana Rodriguez MD        Subjective:     Principal Problem:Bladder mass    HPI:  Rajiv Russo is a 89 yo male with a PMH of HTN, DM, CAD, CKD, CHF who is S/P TURBT per Dr. Jacobo today. The patient tolerated the procedure well. The patient is current sitting up in bed eating dinner without complaints. Patient denies fever, chills, CP, cough, german, pnd, orthopnea, palpitations, diaphoresis, headache, blurred vision, numbness, tingling, dizziness, localized weakness, n/v/d, abdominal pain, blood in stools, melena, hematemesis, urinary frequency, urgency, dysuria or hematuria. Hospital medicine was consulted for medical management.         Hospital Course:  4/18/18 No issues overnight. Plan for discharge home today per primary team.     Interval History: No issues overnight. Plan for discharge home today per primary team.    Review of Systems   Constitutional: Negative for activity change, appetite change, chills, fatigue, fever and unexpected weight change.   HENT: Negative for congestion, facial swelling, rhinorrhea, sinus pressure, sneezing and sore throat.    Eyes: Negative for discharge, redness and visual disturbance.   Respiratory: Negative for apnea, cough, chest tightness, shortness of breath, wheezing and stridor.    Cardiovascular: Negative for chest pain, palpitations and leg swelling.   Gastrointestinal: Negative for abdominal distention, abdominal pain, anal bleeding, blood in stool, constipation, diarrhea, nausea and vomiting.   Genitourinary: Negative for difficulty urinating, discharge, dysuria, frequency and hematuria.   Musculoskeletal: Negative for arthralgias, back pain, gait problem, joint swelling, myalgias, neck pain  and neck stiffness.   Skin: Negative for color change and pallor.   Neurological: Negative for dizziness, tremors, seizures, syncope, facial asymmetry, speech difficulty, weakness, light-headedness, numbness and headaches.   Psychiatric/Behavioral: Negative for behavioral problems, confusion, hallucinations and suicidal ideas. The patient is not nervous/anxious.    All other systems reviewed and are negative.    Objective:     Vital Signs (Most Recent):  Temp: 98.5 °F (36.9 °C) (04/18/18 0844)  Pulse: 72 (04/18/18 0844)  Resp: 18 (04/18/18 0844)  BP: (!) 100/53 (04/18/18 0844)  SpO2: (!) 94 % (04/18/18 0844) Vital Signs (24h Range):  Temp:  [98 °F (36.7 °C)-99.7 °F (37.6 °C)] 98.5 °F (36.9 °C)  Pulse:  [68-72] 72  Resp:  [11-18] 18  SpO2:  [91 %-100 %] 94 %  BP: (100-169)/(52-73) 100/53     Weight: 94.6 kg (208 lb 8.9 oz)  Body mass index is 29.09 kg/m².    Intake/Output Summary (Last 24 hours) at 04/18/18 1522  Last data filed at 04/18/18 0530   Gross per 24 hour   Intake          2316.67 ml   Output             1005 ml   Net          1311.67 ml      Physical Exam   Constitutional: He is oriented to person, place, and time. He appears well-developed and well-nourished.   Elderly appearing    HENT:   Head: Normocephalic and atraumatic.   Eyes: Conjunctivae are normal. Pupils are equal, round, and reactive to light.   Neck: Normal range of motion. Neck supple.   Cardiovascular: Normal rate, regular rhythm, normal heart sounds and intact distal pulses.    No murmur heard.  Pulmonary/Chest: Effort normal and breath sounds normal. No respiratory distress.   Abdominal: Soft. Bowel sounds are normal. He exhibits no distension. There is no tenderness.   Genitourinary:   Genitourinary Comments: Catheter in place   Musculoskeletal: He exhibits no edema, tenderness or deformity.   Neurological: He is alert and oriented to person, place, and time.   Skin: Skin is warm and dry. No rash noted. No erythema.   Psychiatric: He has  a normal mood and affect. His behavior is normal.   Nursing note and vitals reviewed.      Significant Labs: All pertinent labs within the past 24 hours have been reviewed.    Significant Imaging:   Imaging Results    None            Assessment/Plan:      * Bladder mass    - Per primary team          Chronic systolic CHF (congestive heart failure), NYHA class 3    - resume metoprolol   - monitor Intake and output  - daily weight           CKD (chronic kidney disease) stage 3, GFR 30-59 ml/min    - monitor renal function   - BMP in am           HTN (hypertension)    - monitor VS   - resume home meds           DM type 2, uncontrolled, with renal complications    - SSI   - check A1C          CAD (coronary artery disease)    - resume home meds             VTE Risk Mitigation         Ordered     IP VTE HIGH RISK PATIENT  Once      04/17/18 1702              John Medina NP  Department of Hospital Medicine   [facility]

## 2018-04-18 NOTE — NURSING
IVs and tele monitor removed. Standard drainage bag changed to leg bag. Teaching provided to pt and daughters. JOSE. Meds delivered to bedside, including Percocet to pt.

## 2018-04-18 NOTE — NURSING
Chart reviewed for diabetes  pateint was seen by this nurse on previous admit  Please consult if any diabetes educational needs are identified

## 2018-04-18 NOTE — ANESTHESIA POSTPROCEDURE EVALUATION
"Anesthesia Post Evaluation    Patient: Rajiv Russo    Procedure(s) Performed: Procedure(s) (LRB):  EXCISION-BLADDER TUMOR-TRANSURETHRAL (TURBT) (N/A)    Final Anesthesia Type: general  Patient location during evaluation: PACU  Patient participation: Yes- Able to Participate  Level of consciousness: awake and alert  Post-procedure vital signs: reviewed and stable  Pain management: adequate  Airway patency: patent  PONV status at discharge: No PONV  Anesthetic complications: no      Cardiovascular status: blood pressure returned to baseline  Respiratory status: unassisted  Hydration status: euvolemic  Follow-up not needed.        Visit Vitals  BP (!) 169/71   Pulse 69   Temp 36.7 °C (98.1 °F)   Resp 17   Ht 5' 11" (1.803 m)   Wt 92.8 kg (204 lb 9.4 oz)   SpO2 95%   BMI 28.53 kg/m²       Pain/Herminia Score: Pain Assessment Performed: Yes (4/17/2018  5:00 PM)  Presence of Pain: denies (4/17/2018  5:00 PM)  Herminia Score: 9 (4/17/2018  4:45 PM)      "

## 2018-04-18 NOTE — DISCHARGE SUMMARY
Discharge Summary  Urology    Admit Date: 4/17/2018    Discharge Date: 04/18/2018    Discharge Attending Physician: John Jacobo IV, MD     Diagnoses:  Active Hospital Problems    Diagnosis  POA    *Bladder mass [N32.89]  Yes    Chronic systolic CHF (congestive heart failure), NYHA class 3 [I50.22]  Yes     Chronic    CAD (coronary artery disease) [I25.10]  Yes     Followed by Dr. Sanchez, Cardiology      DM type 2, uncontrolled, with renal complications [E11.29, E11.65]  Yes     Chronic    HTN (hypertension) [I10]  Yes    CKD (chronic kidney disease) stage 3, GFR 30-59 ml/min [N18.3]  Yes      Resolved Hospital Problems    Diagnosis Date Resolved POA   No resolved problems to display.       Hospital Course: This patient was seen and examined on the date of discharge and determined to be suitable for discharge.    Pertinent Diagnostic Studies and Procedures:  See chart    Discharged Condition: good    Disposition: Home or Self Care    Follow-up Plans:      Recent Labs:  Recent Results (from the past 336 hour(s))   CBC auto differential    Collection Time: 04/18/18  5:32 AM   Result Value Ref Range    WBC 11.05 3.90 - 12.70 K/uL    Hemoglobin 11.2 (L) 14.0 - 18.0 g/dL    Hematocrit 36.5 (L) 40.0 - 54.0 %    Platelets 191 150 - 350 K/uL     Recent Results (from the past 336 hour(s))   Basic metabolic panel    Collection Time: 04/18/18  5:32 AM   Result Value Ref Range    Sodium 137 136 - 145 mmol/L    Potassium 3.7 3.5 - 5.1 mmol/L    Chloride 99 95 - 110 mmol/L    CO2 30 (H) 23 - 29 mmol/L    BUN, Bld 28 10 - 30 mg/dL    Creatinine 1.6 (H) 0.5 - 1.4 mg/dL    Calcium 8.5 (L) 8.7 - 10.5 mg/dL    Anion Gap 8 8 - 16 mmol/L     Lab Results   Component Value Date    HGBA1C 7.5 (H) 03/13/2018       Discharge Medication List:   Rajiv Russo   Home Medication Instructions JIM:[id number]    Printed on:04/18/18 0754   Medication Information                      alprazolam (XANAX) 0.5 MG tablet  Take 1 tablet  "(0.5 mg total) by mouth nightly as needed for Anxiety.             aspirin (ECOTRIN) 81 MG EC tablet  Take 81 mg by mouth once daily.             atorvastatin (LIPITOR) 20 MG tablet  Take 1 tablet (20 mg total) by mouth every evening.             BD INSULIN PEN NEEDLE UF MINI 31 gauge x 3/16" Ndle               bumetanide (BUMEX) 2 MG tablet  Take 1 tablet twice daily x3 days then 1 tab twice daily on MW             ciprofloxacin HCl (CIPRO) 500 MG tablet  Take 1 tablet (500 mg total) by mouth every 12 (twelve) hours.             cyanocobalamin 1,000 mcg/mL injection  1,000 mcg.              docusate sodium (COLACE) 100 MG capsule  Take 1 capsule (100 mg total) by mouth 2 (two) times daily.             fenofibrate 160 MG Tab  Take 1 tablet (160 mg total) by mouth once daily.             glipiZIDE (GLUCOTROL) 2.5 MG TR24  Take 2.5 mg by mouth 2 (two) times daily with meals.              HUMALOG KWIKPEN 100 unit/mL InPn pen  Take 4 Units by mouth once daily.             insulin glargine (LANTUS) 100 unit/mL injection  Inject 10 Units into the skin 2 (two) times daily.              metOLazone (ZAROXOLYN) 5 MG tablet  Take 1 tablet (5 mg total) by mouth daily as needed.             metoprolol succinate (TOPROL-XL) 25 MG 24 hr tablet  Take 1 tablet (25 mg total) by mouth once daily.             nitroGLYCERIN (NITROSTAT) 0.4 MG SL tablet  Place 1 tablet (0.4 mg total) under the tongue every 5 (five) minutes as needed for Chest pain.             ondansetron (ZOFRAN) 4 MG tablet  Take 1 tablet (4 mg total) by mouth every 6 (six) hours.             oxyCODONE-acetaminophen (PERCOCET) 5-325 mg per tablet  Take 1-2 tablets by mouth every 4 (four) hours as needed for Pain.             potassium chloride (KLOR-CON) 10 MEQ TbSR  Take 10 mEq by mouth once daily.             SPIRIVA WITH HANDIHALER 18 mcg inhalation capsule  Inhale 1 capsule (18 mcg total) into the lungs once daily.             tamsulosin (FLOMAX) 0.4 mg Cp24  Take " 0.4 mg by mouth.             VIT A/VIT C/VIT E/ZINC/COPPER (PRESERVISION AREDS ORAL)  Take 1 tablet by mouth 2 (two) times daily.                      Discharge Procedure Orders  Diet Adult Regular     Activity as tolerated         An excess of 30 minutes was spent discharging this patient.

## 2018-04-19 ENCOUNTER — TELEPHONE (OUTPATIENT)
Dept: UROLOGY | Facility: CLINIC | Age: 83
End: 2018-04-19

## 2018-04-19 RX ORDER — PROMETHAZINE HYDROCHLORIDE 12.5 MG/1
12.5 TABLET ORAL EVERY 6 HOURS PRN
Qty: 20 TABLET | Refills: 1 | Status: SHIPPED | OUTPATIENT
Start: 2018-04-19 | End: 2018-11-20 | Stop reason: SDUPTHER

## 2018-04-19 NOTE — TELEPHONE ENCOUNTER
----- Message from Umm Bryant sent at 4/19/2018  8:36 AM CDT -----  Contact: daughter/Raven  Please call pt daughter @ [phone], states pt need a new script called into [facility] for nausea.

## 2018-04-23 ENCOUNTER — CLINICAL SUPPORT (OUTPATIENT)
Dept: UROLOGY | Facility: CLINIC | Age: 83
End: 2018-04-23
Payer: MEDICARE

## 2018-04-23 ENCOUNTER — TELEPHONE (OUTPATIENT)
Dept: INTERNAL MEDICINE | Facility: CLINIC | Age: 83
End: 2018-04-23

## 2018-04-23 VITALS — WEIGHT: 208 LBS | BODY MASS INDEX: 29.01 KG/M2

## 2018-04-23 PROCEDURE — 99999 PR PBB SHADOW E&M-EST. PATIENT-LVL III: CPT | Mod: PBBFAC,,,

## 2018-04-23 NOTE — TELEPHONE ENCOUNTER
----- Message from Umm Bryant sent at 4/23/2018  9:37 AM CDT -----  Contact: Raevn/daughter  Please call pt daughter @ [phone] or [phone] regarding lab order, need to know if nurse can draw at pt home, do pt need lab done.

## 2018-04-23 NOTE — PROGRESS NOTES
..Pt in today to have arboleda discontinued.  10cc balloon deflated and #20 Fr arboleda successfully removed.  120 cc dark colored urine noted to leg bag.  Pt tolerated procedure well and left clinic ambulatory via w/c with son at his side. Patient was instructed to drink lots of water and that if he has not voided in 6-8 hours to contact our office or go to ER if after hours.

## 2018-04-24 LAB
A1C: 7.4
CHOLEST SERPL-MSCNC: 90 MG/DL (ref 0–200)
CREATININE RANDOM URINE: 58.08 MG/DL
HDLC SERPL-MCNC: 30 MG/DL
LDLC SERPL CALC-MCNC: 43 MG/DL
MICROALB/CRT. RATIO: 657
MICROALBUM.,U,RANDOM: 381.3
NON HDL CHOL. (LDL+VLDL): 60
TRIGLYCERIDE, BF: 84

## 2018-04-24 NOTE — TELEPHONE ENCOUNTER
----- Message from Felecia Caceres sent at 4/23/2018  4:33 PM CDT -----  Contact: daughter Raven  Returning your call concerning the patient. Please call patient @ [phone]. Thanks, kirit

## 2018-04-24 NOTE — TELEPHONE ENCOUNTER
Spoke to patient's daughter, Raven. She states patient just had labs drawn, and wants to know if he still needs to come in today to have them drawn again. I advised patient would still need a CMP, lipid, and urine micro. She advised patient is not feeling well. Patient is currently using [facility]. His nurse is Silvia at [phone]. Can orders be called in for home health to collect? Please advise.

## 2018-05-03 ENCOUNTER — TELEPHONE (OUTPATIENT)
Dept: INTERNAL MEDICINE | Facility: CLINIC | Age: 83
End: 2018-05-03

## 2018-05-03 NOTE — TELEPHONE ENCOUNTER
----- Message from Nikki Stanley sent at 5/3/2018  8:44 AM CDT -----  Contact: Estelle REYES  She's calling to verify if pt's lab results were received through fax, please advise [phone]

## 2018-05-03 NOTE — TELEPHONE ENCOUNTER
----- Message from Nikki Stanley sent at 5/3/2018  8:44 AM CDT -----  Contact: Estelle REYES  She's calling to verify if pt's lab results were received through fax, please advise [phone]

## 2018-05-04 ENCOUNTER — TELEPHONE (OUTPATIENT)
Dept: INTERNAL MEDICINE | Facility: CLINIC | Age: 83
End: 2018-05-04

## 2018-05-04 ENCOUNTER — DOCUMENTATION ONLY (OUTPATIENT)
Dept: ADMINISTRATIVE | Facility: HOSPITAL | Age: 83
End: 2018-05-04

## 2018-05-04 NOTE — TELEPHONE ENCOUNTER
----- Message from Arcelia Finney NP sent at 5/4/2018  1:40 PM CDT -----  Urine micro normal. Please keep upcoming visit with Dr. Donohue

## 2018-05-04 NOTE — TELEPHONE ENCOUNTER
Patient's son notified of results and advised to keep follow up appointment next week, patients son verbalized understanding.

## 2018-05-10 ENCOUNTER — OFFICE VISIT (OUTPATIENT)
Dept: UROLOGY | Facility: CLINIC | Age: 83
End: 2018-05-10
Payer: MEDICARE

## 2018-05-10 ENCOUNTER — HOSPITAL ENCOUNTER (OUTPATIENT)
Dept: RADIOLOGY | Facility: HOSPITAL | Age: 83
Discharge: HOME | End: 2018-05-10
Attending: FAMILY MEDICINE
Payer: MEDICARE

## 2018-05-10 ENCOUNTER — OFFICE VISIT (OUTPATIENT)
Dept: INTERNAL MEDICINE | Facility: CLINIC | Age: 83
End: 2018-05-10
Payer: MEDICARE

## 2018-05-10 VITALS
OXYGEN SATURATION: 95 % | BODY MASS INDEX: 28.95 KG/M2 | HEART RATE: 80 BPM | SYSTOLIC BLOOD PRESSURE: 122 MMHG | TEMPERATURE: 97 F | WEIGHT: 206.81 LBS | HEIGHT: 71 IN | DIASTOLIC BLOOD PRESSURE: 64 MMHG

## 2018-05-10 VITALS
SYSTOLIC BLOOD PRESSURE: 118 MMHG | BODY MASS INDEX: 28.95 KG/M2 | DIASTOLIC BLOOD PRESSURE: 70 MMHG | HEIGHT: 71 IN | WEIGHT: 206.81 LBS

## 2018-05-10 DIAGNOSIS — E78.5 HYPERLIPIDEMIA ASSOCIATED WITH TYPE 2 DIABETES MELLITUS: ICD-10-CM

## 2018-05-10 DIAGNOSIS — M79.89 PAIN AND SWELLING OF LOWER LEG, RIGHT: ICD-10-CM

## 2018-05-10 DIAGNOSIS — E11.69 HYPERLIPIDEMIA ASSOCIATED WITH TYPE 2 DIABETES MELLITUS: ICD-10-CM

## 2018-05-10 DIAGNOSIS — M79.661 PAIN AND SWELLING OF LOWER LEG, RIGHT: ICD-10-CM

## 2018-05-10 DIAGNOSIS — C67.9 MALIGNANT NEOPLASM OF URINARY BLADDER, UNSPECIFIED SITE: Primary | ICD-10-CM

## 2018-05-10 DIAGNOSIS — E11.9 DIABETES MELLITUS TYPE 2 IN NONOBESE: ICD-10-CM

## 2018-05-10 DIAGNOSIS — S80.811A ABRASION OF RIGHT LOWER EXTREMITY, INITIAL ENCOUNTER: Primary | ICD-10-CM

## 2018-05-10 LAB
BILIRUB SERPL-MCNC: NORMAL MG/DL
BLOOD URINE, POC: 250
COLOR, POC UA: YELLOW
GLUCOSE UR QL STRIP: NORMAL
KETONES UR QL STRIP: NORMAL
LEUKOCYTE ESTERASE URINE, POC: NORMAL
NITRITE, POC UA: NORMAL
PH, POC UA: 5
PROTEIN, POC: 100
SPECIFIC GRAVITY, POC UA: 1.01
UROBILINOGEN, POC UA: NORMAL

## 2018-05-10 PROCEDURE — 99024 POSTOP FOLLOW-UP VISIT: CPT | Mod: S$GLB,,, | Performed by: UROLOGY

## 2018-05-10 PROCEDURE — 99499 UNLISTED E&M SERVICE: CPT | Mod: S$GLB,,, | Performed by: FAMILY MEDICINE

## 2018-05-10 PROCEDURE — 99214 OFFICE O/P EST MOD 30 MIN: CPT | Mod: S$GLB,,, | Performed by: FAMILY MEDICINE

## 2018-05-10 PROCEDURE — 81002 URINALYSIS NONAUTO W/O SCOPE: CPT | Mod: S$GLB,,, | Performed by: UROLOGY

## 2018-05-10 PROCEDURE — 99999 PR PBB SHADOW E&M-EST. PATIENT-LVL III: CPT | Mod: PBBFAC,,, | Performed by: FAMILY MEDICINE

## 2018-05-10 PROCEDURE — 99999 PR PBB SHADOW E&M-EST. PATIENT-LVL III: CPT | Mod: PBBFAC,,, | Performed by: UROLOGY

## 2018-05-10 PROCEDURE — 93971 EXTREMITY STUDY: CPT | Mod: TC

## 2018-05-10 RX ORDER — OXYCODONE AND ACETAMINOPHEN 5; 325 MG/1; MG/1
1-2 TABLET ORAL EVERY 4 HOURS PRN
Qty: 30 TABLET | Refills: 0 | Status: SHIPPED | OUTPATIENT
Start: 2018-05-10 | End: 2018-05-23 | Stop reason: SDUPTHER

## 2018-05-10 RX ORDER — MUPIROCIN 20 MG/G
OINTMENT TOPICAL 3 TIMES DAILY
Qty: 22 G | Refills: 0 | Status: SHIPPED | OUTPATIENT
Start: 2018-05-10

## 2018-05-10 NOTE — PROGRESS NOTES
Subjective:       Patient ID: Rajiv Russo is a 89 y.o. male.    Chief Complaint: Follow-up and Leg Pain (rt leg pain)    Patient presents to clinic today for followup. His family is present with him today. He reports falling and hitting his leg a couple of weeks ago. His right leg has a wound on anterior lower leg and has been swollen. He has follow up with Dr. Jacobo as well today.      Review of Systems   Constitutional: Negative for chills, fatigue, fever and unexpected weight change.   Eyes: Negative for visual disturbance.   Respiratory: Negative for shortness of breath.    Cardiovascular: Positive for leg swelling. Negative for chest pain.   Musculoskeletal: Negative for myalgias.   Skin: Positive for wound.   Neurological: Negative for headaches.       Objective:      Physical Exam   Constitutional: He is oriented to person, place, and time. He appears well-developed and well-nourished. No distress.   HENT:   Head: Normocephalic and atraumatic.   Eyes: Conjunctivae and EOM are normal. Pupils are equal, round, and reactive to light. No scleral icterus.   Pulmonary/Chest: Effort normal.   Musculoskeletal: He exhibits edema (right leg).   Neurological: He is alert and oriented to person, place, and time. No cranial nerve deficit. Gait normal.   Skin:        Psychiatric: He has a normal mood and affect.   Vitals reviewed.      Assessment:       1. Abrasion of right lower extremity, initial encounter    2. Pain and swelling of lower leg, right    3. Diabetes mellitus type 2 in nonobese    4. Hyperlipidemia associated with type 2 diabetes mellitus        Plan:     Problem List Items Addressed This Visit     Hyperlipidemia associated with type 2 diabetes mellitus    Current Assessment & Plan     LDL 43, continue current meds         Diabetes mellitus type 2 in nonobese    Current Assessment & Plan     a1c 7.4, continue current meds           Other Visit Diagnoses     Abrasion of right lower extremity,  initial encounter    -  Primary    bactroban 2-3 times daily until healed; follow up if worse/not improving    Relevant Medications    mupirocin (BACTROBAN) 2 % ointment    Pain and swelling of lower leg, right        Relevant Orders    US Lower Extremity Veins Right (Completed)

## 2018-05-10 NOTE — PROGRESS NOTES
Chief Complaint: T2 Bladder Cancer    HPI:   5/10/18: TURBT complete, arboleda out, voiding okay.  Having OAB symptoms we reviewed in detail.  4/1/18: US suggests a bladder mass. Cysto confirms. Likely complicaiton of prior XRT.  3/13/18: 89 yo man had bladder cancer dx 40 years ago and Dr. Andersen in [place] looked in his bladder a year or two; last time a bladder lesion was seen was 15-20 years ago.  Had XRT for prostate cancer early 80's.  Had gross hematuria recently and was sent for evaluation of that.  Has had a lot of UCx sent over last year never had a UTI.  No abd/pelvic pain and no exac/rel factors.  No urolithiasis.  No urinary bother other than frequency.  No  history.  Normal sexual function.    Allergies:  Patient has no known allergies.    Medications:  has a current medication list which includes the following prescription(s): alprazolam, aspirin, atorvastatin, bd ultra-fine mini pen needle, bumetanide, cyanocobalamin, docusate sodium, fenofibrate, glipizide, humalog kwikpen insulin, insulin glargine, metolazone, metoprolol succinate, mupirocin, nitroglycerin, ondansetron, oxycodone-acetaminophen, potassium chloride, promethazine, spiriva with handihaler, tamsulosin, and vit a/vit c/vit e/zinc/copper.    Review of Systems:  General: No fever, chills, fatigability, or weight loss.  Skin: No rashes, itching, or changes in color or texture of skin.  Chest: Denies BORGES, cyanosis, wheezing, cough, and sputum production.  Abdomen: Appetite fine. No weight loss. Denies diarrhea, abdominal pain, hematemesis, or blood in stool.  Musculoskeletal: No joint stiffness or swelling. Denies back pain.  : As above.  All other review of systems negative.    PMH:   has a past medical history of Acute coronary syndrome; Bladder cancer; Bradycardia; CHF (congestive heart failure); CKD (chronic kidney disease) stage 3, GFR 30-59 ml/min; Coronary artery disease; Diabetes mellitus; Heart attack; Hyperlipidemia; Hypertension;  Macular degeneration; and Pacemaker.    PSH:   has a past surgical history that includes colon removal. ; Bladder surgery; Cataract extraction; and Cardiac pacemaker placement.    FamHx: family history includes No Known Problems in his brother, father, maternal aunt, maternal grandfather, maternal grandmother, maternal uncle, mother, paternal aunt, paternal grandfather, paternal grandmother, paternal uncle, and sister.    SocHx:  reports that he has quit smoking. He has quit using smokeless tobacco. He reports that he does not drink alcohol or use drugs.      Physical Exam:  Vitals:    05/10/18 1308   BP: 118/70     General: A&Ox3, no apparent distress, no deformities  Neck: No masses, normal thyroid  Lungs: normal inspiration, no use of accessory muscles  Heart: normal pulse, no arrhythmias  Abdomen: Soft, NT, ND  Skin: The skin is warm and dry. No jaundice.  Ext: No c/c/e.  :   4/18: Test desc mehdi, no abnormalities of epididymus. Penis normal, with normal penile and scrotal skin. Meatus normal.     Labs/Studies:   Bladder Scan performed in office:     4/18: PVR 0 ml.    Impression/Plan:   1. T2 bladder cancer, unfavorable for cystoprostatectomy (age, prior XRT, cardiac condition).  Will cysto in 1 mo and assess obstruction, managing conservatively.  Uncurable most likely and prognosis dim.  Anesthesia risk very high.  2. If outlet obstruction occurs in an inoperable fashion would move to bilateral nephrostomy and not SP tube.

## 2018-05-11 ENCOUNTER — TELEPHONE (OUTPATIENT)
Dept: INTERNAL MEDICINE | Facility: CLINIC | Age: 83
End: 2018-05-11

## 2018-05-11 NOTE — TELEPHONE ENCOUNTER
----- Message from Jordana Rodriguez MD sent at 5/10/2018  9:02 PM CDT -----  Please notify patient/family that ultrasound is negative for blood clot.

## 2018-05-11 NOTE — TELEPHONE ENCOUNTER
Patients family notified of results. Advised to call the office as needed, patients family verbalized understanding.

## 2018-05-17 ENCOUNTER — OFFICE VISIT (OUTPATIENT)
Dept: HEMATOLOGY/ONCOLOGY | Facility: CLINIC | Age: 83
End: 2018-05-17
Payer: MEDICARE

## 2018-05-17 VITALS
BODY MASS INDEX: 29.19 KG/M2 | WEIGHT: 208.5 LBS | HEART RATE: 68 BPM | SYSTOLIC BLOOD PRESSURE: 120 MMHG | RESPIRATION RATE: 18 BRPM | HEIGHT: 71 IN | TEMPERATURE: 98 F | OXYGEN SATURATION: 96 % | DIASTOLIC BLOOD PRESSURE: 60 MMHG

## 2018-05-17 DIAGNOSIS — C67.4 MALIGNANT NEOPLASM OF POSTERIOR WALL OF URINARY BLADDER: ICD-10-CM

## 2018-05-17 PROBLEM — N32.89 BLADDER MASS: Status: RESOLVED | Noted: 2018-04-17 | Resolved: 2018-05-17

## 2018-05-17 PROCEDURE — 99499 UNLISTED E&M SERVICE: CPT | Mod: S$GLB,,, | Performed by: INTERNAL MEDICINE

## 2018-05-17 PROCEDURE — 99204 OFFICE O/P NEW MOD 45 MIN: CPT | Mod: S$GLB,,, | Performed by: INTERNAL MEDICINE

## 2018-05-17 PROCEDURE — 99999 PR PBB SHADOW E&M-EST. PATIENT-LVL III: CPT | Mod: PBBFAC,,, | Performed by: INTERNAL MEDICINE

## 2018-05-17 NOTE — LETTER
May 17, 2018      John Jacobo IV, MD  [address] [place] [address]           [facility]  [address] [place] [address]  Phone: [phone]  Fax: [phone]          Patient: Rajiv Russo   MR Number: [id number]   YOB: 1919   Date of Visit: 5/17/2018       Dear Dr. John Jacobo IV:    Thank you for referring Rajiv Russo to me for evaluation. Attached you will find relevant portions of my assessment and plan of care.    If you have questions, please do not hesitate to call me. I look forward to following Rajiv Russo along with you.    Sincerely,    Tapan Burch MD    Enclosure  CC:  No Recipients    If you would like to receive this communication electronically, please contact [email or web address] or [phone] to request more information on [facility] Link access.    For providers and/or their staff who would like to refer a patient to [facility], please contact us through our one-stop-shop provider referral line, [facility], at [phone].    If you feel you have received this communication in error or would no longer like to receive these types of communications, please e-mail [email or web address]

## 2018-05-17 NOTE — PROGRESS NOTES
Distress Screening Results: Psychosocial Distress screening score of Distress Score: 0 {AMB ONC DISTRESS SCORE:10513}

## 2018-05-17 NOTE — PROGRESS NOTES
Subjective:       Patient ID: Rajiv Russo is a 89 y.o. male.    Chief Complaint: Consult; Results; and Bladder Cancer    HPI 89-year-old male referred for evaluation of resected invasive bladder carcinoma for further therapeutic considerations    Past Medical History:   Diagnosis Date    Acute coronary syndrome     Bladder cancer     Bradycardia     CHF (congestive heart failure)     CKD (chronic kidney disease) stage 3, GFR 30-59 ml/min     Coronary artery disease     Diabetes mellitus     Heart attack     Hyperlipidemia     Hypertension     Macular degeneration     Pacemaker      Family History   Problem Relation Age of Onset    No Known Problems Mother     No Known Problems Father     No Known Problems Sister     No Known Problems Brother     No Known Problems Maternal Aunt     No Known Problems Maternal Uncle     No Known Problems Paternal Aunt     No Known Problems Paternal Uncle     No Known Problems Maternal Grandmother     No Known Problems Maternal Grandfather     No Known Problems Paternal Grandmother     No Known Problems Paternal Grandfather     Amblyopia Neg Hx     Blindness Neg Hx     Cancer Neg Hx     Cataracts Neg Hx     Diabetes Neg Hx     Glaucoma Neg Hx     Hypertension Neg Hx     Macular degeneration Neg Hx     Retinal detachment Neg Hx     Strabismus Neg Hx     Stroke Neg Hx     Thyroid disease Neg Hx      Social History     Social History    Marital status:      Spouse name: N/A    Number of children: N/A    Years of education: N/A     Occupational History    retired [employer]      Social History Main Topics    Smoking status: Former Smoker    Smokeless tobacco: Former User    Alcohol use No    Drug use: No    Sexual activity: No     Other Topics Concern    Not on file     Social History Narrative    No narrative on file     Past Surgical History:   Procedure Laterality Date    BLADDER SURGERY      x2    CARDIAC PACEMAKER  PLACEMENT      CATARACT EXTRACTION      colon removal.          Labs:  Lab Results   Component Value Date    WBC 11.05 04/18/2018    HGB 11.2 (L) 04/18/2018    HCT 36.5 (L) 04/18/2018    MCV 89 04/18/2018     04/18/2018     BMP  Lab Results   Component Value Date     04/18/2018    K 3.7 04/18/2018    CL 99 04/18/2018    CO2 30 (H) 04/18/2018    BUN 28 04/18/2018    CREATININE 1.6 (H) 04/18/2018    CALCIUM 8.5 (L) 04/18/2018    ANIONGAP 8 04/18/2018    ESTGFRAFRICA 41 (A) 04/18/2018    EGFRNONAA 35 (A) 04/18/2018     Lab Results   Component Value Date    ALT 7 (L) 12/31/2017    AST 16 12/31/2017    ALKPHOS 41 (L) 12/31/2017    BILITOT 0.8 12/31/2017       No results found for: IRON, TIBC, FERRITIN, SATURATEDIRO  No results found for: [id number]  No results found for: FOLATE  Lab Results   Component Value Date    TSH 3.57 11/01/2011         Review of Systems   Constitutional: Positive for activity change, appetite change and fatigue. Negative for chills, diaphoresis, fever and unexpected weight change.   HENT: Negative for congestion, dental problem, drooling, ear discharge, ear pain, facial swelling, hearing loss, mouth sores, nosebleeds, postnasal drip, rhinorrhea, sinus pressure, sneezing, sore throat, tinnitus, trouble swallowing and voice change.    Eyes: Negative for photophobia, pain, discharge, redness, itching and visual disturbance.   Respiratory: Positive for shortness of breath. Negative for apnea, cough, choking, chest tightness, wheezing and stridor.    Cardiovascular: Negative for chest pain, palpitations and leg swelling.   Gastrointestinal: Negative for abdominal distention, abdominal pain, anal bleeding, blood in stool, constipation, diarrhea, nausea, rectal pain and vomiting.   Endocrine: Negative for cold intolerance, heat intolerance, polydipsia, polyphagia and polyuria.   Genitourinary: Negative for decreased urine volume, difficulty urinating, discharge, dysuria, enuresis, flank  pain, frequency, genital sores, hematuria, penile pain, penile swelling, scrotal swelling, testicular pain and urgency.   Musculoskeletal: Positive for arthralgias, gait problem and myalgias. Negative for back pain, joint swelling, neck pain and neck stiffness.   Skin: Negative for color change, pallor, rash and wound.   Allergic/Immunologic: Negative for environmental allergies, food allergies and immunocompromised state.   Neurological: Positive for weakness. Negative for dizziness, tremors, seizures, syncope, facial asymmetry, speech difficulty, light-headedness, numbness and headaches.   Hematological: Negative for adenopathy. Does not bruise/bleed easily.   Psychiatric/Behavioral: Positive for dysphoric mood. Negative for agitation, behavioral problems, confusion, decreased concentration, hallucinations, self-injury, sleep disturbance and suicidal ideas. The patient is nervous/anxious. The patient is not hyperactive.        Objective:      Physical Exam   Constitutional: He is oriented to person, place, and time. He appears cachectic. He has a sickly appearance. He appears ill. He appears distressed.   HENT:   Head: Normocephalic.   Right Ear: External ear normal.   Left Ear: External ear normal.   Nose: Nose normal. Right sinus exhibits no maxillary sinus tenderness and no frontal sinus tenderness. Left sinus exhibits no maxillary sinus tenderness and no frontal sinus tenderness.   Mouth/Throat: Oropharynx is clear and moist. No oropharyngeal exudate.   Eyes: EOM and lids are normal. Pupils are equal, round, and reactive to light. Right eye exhibits no discharge. Left eye exhibits no discharge. Right conjunctiva is not injected. Right conjunctiva has no hemorrhage. Left conjunctiva is not injected. Left conjunctiva has no hemorrhage. No scleral icterus. Right eye exhibits normal extraocular motion. Left eye exhibits normal extraocular motion.   Neck: Normal range of motion. Neck supple. No JVD present. No  tracheal deviation present. No thyromegaly present.   Cardiovascular: Normal rate and regular rhythm.    Pulmonary/Chest: Effort normal. No stridor. No respiratory distress.   Abdominal: Soft. He exhibits no mass. There is no hepatosplenomegaly, splenomegaly or hepatomegaly. There is no tenderness.   Musculoskeletal: Normal range of motion. He exhibits no edema or tenderness.   Lymphadenopathy:        Head (right side): No posterior auricular and no occipital adenopathy present.        Head (left side): No posterior auricular and no occipital adenopathy present.     He has no cervical adenopathy.        Right cervical: No superficial cervical, no deep cervical and no posterior cervical adenopathy present.       Left cervical: No superficial cervical, no deep cervical and no posterior cervical adenopathy present.     He has no axillary adenopathy.        Right: No supraclavicular adenopathy present.        Left: No supraclavicular adenopathy present.   Neurological: He is alert and oriented to person, place, and time. He has normal strength. No cranial nerve deficit. Coordination normal.   Skin: Skin is dry. No rash noted. He is not diaphoretic. No cyanosis or erythema. Nails show no clubbing.   Psychiatric: His behavior is normal. Judgment and thought content normal. His mood appears anxious. Cognition and memory are normal. He exhibits a depressed mood.   Vitals reviewed.          Assessment:      1. Malignant neoplasm of posterior wall of urinary bladder           Plan:   Extensive conversation with family present at this point resected bladder carcinoma with all gross disease resected on 4/17/18 surgical resection.  At this point my recommendations would be to see radiation oncology.  To see whether or not any postoperative radiation therapy would be beneficial my inclination is is the likelihood as small the patient has a previous history of prostate carcinoma greater than 15 years ago and received radiation  therapy but I will defer to them for further therapy considerations from a systemic cancer chemotherapy or immunotherapy standpoint at this point nothing to recommend but will discuss with radiation oncology for consideration. Psychosocial Distress screening score of Distress Score: 0 noted and reviewed. No intervention indicated.

## 2018-05-23 ENCOUNTER — INITIAL CONSULT (OUTPATIENT)
Dept: RADIATION ONCOLOGY | Facility: CLINIC | Age: 83
End: 2018-05-23
Payer: MEDICARE

## 2018-05-23 VITALS
WEIGHT: 208.56 LBS | BODY MASS INDEX: 29.2 KG/M2 | RESPIRATION RATE: 18 BRPM | SYSTOLIC BLOOD PRESSURE: 113 MMHG | HEIGHT: 71 IN | TEMPERATURE: 97 F | HEART RATE: 75 BPM | DIASTOLIC BLOOD PRESSURE: 63 MMHG

## 2018-05-23 DIAGNOSIS — C67.4 MALIGNANT NEOPLASM OF POSTERIOR WALL OF URINARY BLADDER: Primary | ICD-10-CM

## 2018-05-23 DIAGNOSIS — R52 PAIN: Primary | ICD-10-CM

## 2018-05-23 PROCEDURE — 99204 OFFICE O/P NEW MOD 45 MIN: CPT | Mod: S$GLB,,, | Performed by: RADIOLOGY

## 2018-05-23 PROCEDURE — 99999 PR PBB SHADOW E&M-EST. PATIENT-LVL IV: CPT | Mod: PBBFAC,,, | Performed by: RADIOLOGY

## 2018-05-23 RX ORDER — OXYCODONE AND ACETAMINOPHEN 5; 325 MG/1; MG/1
TABLET ORAL
Qty: 60 TABLET | Refills: 0 | Status: SHIPPED | OUTPATIENT
Start: 2018-05-23

## 2018-05-23 NOTE — LETTER
May 23, 2018      Tapan Burch MD  [address]  [facility] [address]           [place] - Radiation Oncology  [address] [place] [address]  Phone: [phone]  Fax: [phone]          Patient: Rajiv Russo   MR Number: [id number]   YOB: 1919   Date of Visit: 5/23/2018       Dear Dr. Tapan Burch:    Thank you for referring Rajiv Russo to me for evaluation. Attached you will find relevant portions of my assessment and plan of care.    If you have questions, please do not hesitate to call me. I look forward to following Rajiv Russo along with you.    Sincerely,    Gerald Ward MD    Enclosure  CC:  No Recipients    If you would like to receive this communication electronically, please contact [email or web address] or [phone] to request more information on [facility] Link access.    For providers and/or their staff who would like to refer a patient to [facility], please contact us through our one-stop-shop provider referral line, [facility], at [phone].    If you feel you have received this communication in error or would no longer like to receive these types of communications, please e-mail [email or web address]

## 2018-05-23 NOTE — PROGRESS NOTES
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     OTHER MEDICAL HISTORY: Patient is a former smoker. He had near complete colectomy about 20 years ago for a colitis, cataract extraction, I and D of an abscess on the right shin and pacemaker placement. He is treated or followed for history of MI, bradycardia, CHF, chronic kidney disease, coronary artery disease, diabetes, hyperlipidemia, hypertension and macular degeneration. PS is ECOG 1. Psychosocial Distress screening score of Distress Score: 8 noted and reviewed. No intervention indicated.    PRIOR CANCER HISTORY: Patient was treated for prostate cancer at the [facility] facility in [facility] about 20 years ago.     He has had several transurethral resection of bladder tumors.    PRESENT ILLNESS: Patient recently developed intermittent suprapubic pain that is prominent when voiding. A renal stone CT on 4/13/18 shows a mass in the right posterior bladder wall. On 4/17/18 a large tumor was cystoscopically removed from the bladder which proved to be a high grade urothelial carcinoma invading into muscularis propria. Patient continues to have the bladder pain and dysuria. He has incontinence requiring him to wear absorbent underpants.     PHYSICAL EXAM: Patient is an alert elderly man who responds appropriately. He has a hearing aid and portable nasal canula oxygen. He is ambulatory with a walker. The weight is 208 pounds and the BMI is 29.1. The respirations are normal. There are no evident neurologic deficits.     RADIOLOGIC STUDIES: The CT on 4/13/18 in addition to the bladder tumor shows thickening of the distal  rectum suggesting proctitis,  possibly related to the prior radiation treatment of the prostate.     LABORATORY STUDIES: On 4/18/18 the Hb is 11.2 with a wbc of 11,050 and a  platelet count of 191,000. Basic metabolic panel is remarkable for a glucose of 195 and a creatinine of 1.6. On 4/17/18 the Hb A1c is elevated to 7.3.     IMPRESSION: Patient may benefit from irradiation of the bladder tumor with relief of the dysuric pain and for the small but significant chance the bladder cancer can be controlled for an extended period of time.      PLAN: We will seek record of the prostate radiation treatment. A PET scan has been scheduled for 6/6/18 and patient returns for radiation treatment planning simulation the same day. (45 minutes in discussion with patient and daughters and in coordinating care).

## 2018-05-30 ENCOUNTER — TELEPHONE (OUTPATIENT)
Dept: HEMATOLOGY/ONCOLOGY | Facility: CLINIC | Age: 83
End: 2018-05-30

## 2018-05-30 NOTE — TELEPHONE ENCOUNTER
Phoned patient to check in. Patient stated that he was doing fine. Patient directed me to talk to his wife who also stated that they were doing fine. She was very thankful for me calling to check in. I reported that a  would try to check in in person on 6/6 for his simulation appointment.

## 2018-06-05 ENCOUNTER — TELEPHONE (OUTPATIENT)
Dept: UROLOGY | Facility: CLINIC | Age: 83
End: 2018-06-05

## 2018-06-05 ENCOUNTER — TELEPHONE (OUTPATIENT)
Dept: RADIOLOGY | Facility: HOSPITAL | Age: 83
End: 2018-06-05

## 2018-06-05 NOTE — TELEPHONE ENCOUNTER
----- Message from Radha Zaragoza sent at 6/5/2018  9:42 AM CDT -----  Contact: pt daughter - Raven   States she's calling rg pt had surgery at the end of April and is passing blood/ yesterday and today and seems to be a lot and feels funny and can be reached at [phone] if no answer cell [phone]//thanks/dbw

## 2018-06-05 NOTE — TELEPHONE ENCOUNTER
Patient's daughter states patient had TURBT in April 2018 is now experiencing gross hematuria. Patient denies fever, pain, nausea, vomiting, or bladder pain/pressure. No clots noted in urine. Advised daughter to keep patient hydrated and monitor patient for fever or weakness. Patient has appointment on 6/11/18 for cystoscopy with Dr. Jacobo. Daughter states they will keep that appointment. Patient to go to ER if symptoms worsen.

## 2018-06-06 ENCOUNTER — HOSPITAL ENCOUNTER (OUTPATIENT)
Dept: RADIATION THERAPY | Facility: HOSPITAL | Age: 83
Discharge: HOME | End: 2018-06-06
Attending: RADIOLOGY
Payer: MEDICARE

## 2018-06-06 ENCOUNTER — HOSPITAL ENCOUNTER (OUTPATIENT)
Dept: RADIOLOGY | Facility: HOSPITAL | Age: 83
Discharge: HOME | End: 2018-06-06
Attending: RADIOLOGY
Payer: MEDICARE

## 2018-06-06 DIAGNOSIS — C67.4 MALIGNANT NEOPLASM OF POSTERIOR WALL OF URINARY BLADDER: ICD-10-CM

## 2018-06-06 PROCEDURE — 77334 RADIATION TREATMENT AID(S): CPT | Mod: 26,,, | Performed by: RADIOLOGY

## 2018-06-06 PROCEDURE — 78815 PET IMAGE W/CT SKULL-THIGH: CPT | Mod: TC,PS

## 2018-06-06 PROCEDURE — 77334 RADIATION TREATMENT AID(S): CPT | Mod: TC | Performed by: RADIOLOGY

## 2018-06-06 PROCEDURE — A9552 F18 FDG: HCPCS

## 2018-06-06 PROCEDURE — 78815 PET IMAGE W/CT SKULL-THIGH: CPT | Mod: 26,PS,, | Performed by: RADIOLOGY

## 2018-06-06 PROCEDURE — 77290 THER RAD SIMULAJ FIELD CPLX: CPT | Mod: 26,,, | Performed by: RADIOLOGY

## 2018-06-06 PROCEDURE — 77290 THER RAD SIMULAJ FIELD CPLX: CPT | Mod: TC | Performed by: RADIOLOGY

## 2018-06-06 PROCEDURE — 77263 THER RADIOLOGY TX PLNG CPLX: CPT | Mod: ,,, | Performed by: RADIOLOGY

## 2018-06-06 PROCEDURE — 77014 HC CT GUIDANCE RADIATION THERAPY FLDS PLACEMENT: CPT | Mod: TC | Performed by: RADIOLOGY

## 2018-06-07 ENCOUNTER — TELEPHONE (OUTPATIENT)
Dept: ADMINISTRATIVE | Facility: CLINIC | Age: 83
End: 2018-06-07

## 2018-06-11 ENCOUNTER — OFFICE VISIT (OUTPATIENT)
Dept: UROLOGY | Facility: CLINIC | Age: 83
End: 2018-06-11
Payer: MEDICARE

## 2018-06-11 VITALS
HEART RATE: 50 BPM | HEIGHT: 71 IN | SYSTOLIC BLOOD PRESSURE: 128 MMHG | WEIGHT: 200 LBS | DIASTOLIC BLOOD PRESSURE: 56 MMHG | BODY MASS INDEX: 28 KG/M2

## 2018-06-11 DIAGNOSIS — C67.9 MALIGNANT NEOPLASM OF URINARY BLADDER, UNSPECIFIED SITE: Primary | ICD-10-CM

## 2018-06-11 LAB
BILIRUB SERPL-MCNC: NORMAL MG/DL
BLOOD URINE, POC: 250
COLOR, POC UA: NORMAL
GLUCOSE UR QL STRIP: NORMAL
KETONES UR QL STRIP: NORMAL
LEUKOCYTE ESTERASE URINE, POC: NORMAL
NITRITE, POC UA: NORMAL
PH, POC UA: 5
PROTEIN, POC: NORMAL
SPECIFIC GRAVITY, POC UA: 1.01
UROBILINOGEN, POC UA: NORMAL

## 2018-06-11 PROCEDURE — 99499 UNLISTED E&M SERVICE: CPT | Mod: S$GLB,,, | Performed by: UROLOGY

## 2018-06-11 PROCEDURE — 99999 PR PBB SHADOW E&M-EST. PATIENT-LVL II: CPT | Mod: PBBFAC,,, | Performed by: UROLOGY

## 2018-06-11 PROCEDURE — 81002 URINALYSIS NONAUTO W/O SCOPE: CPT | Mod: S$GLB,,, | Performed by: UROLOGY

## 2018-06-11 PROCEDURE — 52000 CYSTOURETHROSCOPY: CPT | Mod: 59,S$GLB,, | Performed by: UROLOGY

## 2018-06-11 NOTE — PROGRESS NOTES
Chief Complaint: T2 Bladder Cancer    HPI:   6/11/18: Cysto today shows recurrent large anterior bladder mass.  5/10/18: TURBT complete, arboleda out, voiding okay.  Having OAB symptoms we reviewed in detail.  4/1/18: US suggests a bladder mass. Cysto confirms. Likely complicaiton of prior XRT.  3/13/18: 89 yo man had bladder cancer dx 40 years ago and Dr. Andersen in [place] looked in his bladder a year or two; last time a bladder lesion was seen was 15-20 years ago.  Had XRT for prostate cancer early 80's.  Had gross hematuria recently and was sent for evaluation of that.  Has had a lot of UCx sent over last year never had a UTI.  No abd/pelvic pain and no exac/rel factors.  No urolithiasis.  No urinary bother other than frequency.  No  history.  Normal sexual function.    Allergies:  Patient has no known allergies.    Medications:  has a current medication list which includes the following prescription(s): alprazolam, aspirin, atorvastatin, bd ultra-fine mini pen needle, bumetanide, cyanocobalamin, docusate sodium, fenofibrate, glipizide, humalog kwikpen insulin, insulin glargine, metolazone, metoprolol succinate, mupirocin, nitroglycerin, ondansetron, oxycodone-acetaminophen, potassium chloride, promethazine, spiriva with handihaler, tamsulosin, and vit a/vit c/vit e/zinc/copper.    Review of Systems:  General: No fever, chills, fatigability, or weight loss.  Skin: No rashes, itching, or changes in color or texture of skin.  Chest: Denies BORGES, cyanosis, wheezing, cough, and sputum production.  Abdomen: Appetite fine. No weight loss. Denies diarrhea, abdominal pain, hematemesis, or blood in stool.  Musculoskeletal: No joint stiffness or swelling. Denies back pain.  : As above.  All other review of systems negative.    PMH:   has a past medical history of Acute coronary syndrome; Bladder cancer; Bradycardia; CHF (congestive heart failure); CKD (chronic kidney disease) stage 3, GFR 30-59 ml/min; Coronary artery  disease; Diabetes mellitus; Heart attack; Hyperlipidemia; Hypertension; Macular degeneration; and Pacemaker.    PSH:   has a past surgical history that includes colon removal. ; Bladder surgery; Cataract extraction; and Cardiac pacemaker placement.    FamHx: family history includes No Known Problems in his brother, father, maternal aunt, maternal grandfather, maternal grandmother, maternal uncle, mother, paternal aunt, paternal grandfather, paternal grandmother, paternal uncle, and sister.    SocHx:  reports that he has quit smoking. He has quit using smokeless tobacco. He reports that he does not drink alcohol or use drugs.      Physical Exam:  Vitals:    06/11/18 1315   BP: (!) 128/56   Pulse: (!) 50     General: A&Ox3, no apparent distress, no deformities  Neck: No masses, normal thyroid  Lungs: normal inspiration, no use of accessory muscles  Heart: normal pulse, no arrhythmias  Abdomen: Soft, NT, ND  Skin: The skin is warm and dry. No jaundice.  Ext: No c/c/e.  :   4/18: Test desc mehdi, no abnormalities of epididymus. Penis normal, with normal penile and scrotal skin. Meatus normal.     Labs/Studies:   Bladder Scan performed in office:     4/18: PVR 0 ml.    Procedure: Diagnostic Cystoscopy    Procedure in Detail: After proper consents were obtained, the patient was prepped and draped in normal sterile fashion for diagnostic cystoscopy. 5 ml of lidocaine jelly was instilled in the urethra. The flexible cystoscope was then introduced into the urethra, and advanced into the bladder under direct vision. The urethral mucosa appeared normal, and no strictures were noted. The sphincter appeared to be normal, and the veru montanum was unremarkable. The prostatic mucosa and the lateral lobes of the prostate were normal. The bladder neck was normal. Inspection of the interior of the bladder was then carried out. The trigone was unremarkable, with no mucosal lesions. The ureteral orifices were normal in position and  configuration. Systematic inspection of the mucosa of the bladder it was then carried out, rotating the cystoscope so that all areas of the left and right lateral walls, the dome of the bladder, and the posterior wall were all visualized. The cystoscope was then advanced further into the bladder, and maximum deflection of the scope was performed so that the bladder neck could be inspected. No mucosal lesions were noted there. The cystoscope was then removed, and the procedure terminated.     Findings: large anterior mass recurrent, not obstructing but a reason for hematuria    Impression/Plan:   1. T2 bladder cancer, unfavorable for cystoprostatectomy (age, prior XRT, cardiac condition).  Uncurable most likely and prognosis dim.  Anesthesia risk very high.  2. If outlet obstruction occurs in an inoperable fashion would move to bilateral nephrostomy and not SP tube.  3. Will manage expectantly.  Resection merely prolongs time to XRT currently planned for later this week.  Resecting again will not improve overall condition and may not avoid hematuria/retention.  4. RTC 1 mo

## 2018-06-13 ENCOUNTER — TELEPHONE (OUTPATIENT)
Dept: RADIATION ONCOLOGY | Facility: CLINIC | Age: 83
End: 2018-06-13

## 2018-06-13 PROCEDURE — 77301 RADIOTHERAPY DOSE PLAN IMRT: CPT | Mod: 26,,, | Performed by: RADIOLOGY

## 2018-06-13 PROCEDURE — 77301 RADIOTHERAPY DOSE PLAN IMRT: CPT | Mod: TC | Performed by: RADIOLOGY

## 2018-06-13 NOTE — TELEPHONE ENCOUNTER
Spoke with pt to cancel appt tomorrow pending insurance approval. Informed pt we will call back with a new start date.

## 2018-06-14 ENCOUNTER — DOCUMENTATION ONLY (OUTPATIENT)
Dept: RADIATION ONCOLOGY | Facility: CLINIC | Age: 83
End: 2018-06-14

## 2018-06-14 ENCOUNTER — TELEPHONE (OUTPATIENT)
Dept: ADMINISTRATIVE | Facility: CLINIC | Age: 83
End: 2018-06-14

## 2018-06-14 ENCOUNTER — SOCIAL WORK (OUTPATIENT)
Dept: RADIATION ONCOLOGY | Facility: CLINIC | Age: 83
End: 2018-06-14

## 2018-06-14 PROCEDURE — 77300 RADIATION THERAPY DOSE PLAN: CPT | Mod: TC | Performed by: RADIOLOGY

## 2018-06-14 PROCEDURE — 77386 HC IMRT, COMPLEX: CPT | Performed by: RADIOLOGY

## 2018-06-14 PROCEDURE — 77338 DESIGN MLC DEVICE FOR IMRT: CPT | Mod: 26,,, | Performed by: RADIOLOGY

## 2018-06-14 PROCEDURE — 77014 HC CT GUIDANCE RADIATION THERAPY FLDS PLACEMENT: CPT | Mod: TC | Performed by: RADIOLOGY

## 2018-06-14 PROCEDURE — 77338 DESIGN MLC DEVICE FOR IMRT: CPT | Mod: TC | Performed by: RADIOLOGY

## 2018-06-14 PROCEDURE — 77300 RADIATION THERAPY DOSE PLAN: CPT | Mod: 26,,, | Performed by: RADIOLOGY

## 2018-06-14 NOTE — PROGRESS NOTES
Met with patient and his son (Kevin) to check in and introduce them to social work services. Patient stated he was doing okay-had some questions about urination-I encouraged him to ask MONTSERRAT Pepe medical questions because I was not sure. Provided them with check list and social work card and encouraged them to call/stop by if they need anything. Patient thanked me and stated he did not have any needs at this time.

## 2018-06-14 NOTE — PLAN OF CARE
Problem: Patient Care Overview  Goal: Plan of Care Review  Outcome: Ongoing (interventions implemented as appropriate)  Day 1 xrt to bladder. Pelvis handout given. Skin care and side effects reviewed. Contact info given. Pt verbalized understanding.

## 2018-06-15 PROCEDURE — G6002 STEREOSCOPIC X-RAY GUIDANCE: HCPCS | Mod: 26,,, | Performed by: RADIOLOGY

## 2018-06-15 PROCEDURE — 77386 HC IMRT, COMPLEX: CPT | Performed by: RADIOLOGY

## 2018-06-18 PROCEDURE — 77386 HC IMRT, COMPLEX: CPT | Performed by: RADIOLOGY

## 2018-06-18 PROCEDURE — G6002 STEREOSCOPIC X-RAY GUIDANCE: HCPCS | Mod: 26,,, | Performed by: RADIOLOGY

## 2018-06-18 PROCEDURE — 77417 THER RADIOLOGY PORT IMAGE(S): CPT | Performed by: RADIOLOGY

## 2018-06-19 PROCEDURE — 77386 HC IMRT, COMPLEX: CPT | Performed by: RADIOLOGY

## 2018-06-19 PROCEDURE — G6002 STEREOSCOPIC X-RAY GUIDANCE: HCPCS | Mod: 26,,, | Performed by: RADIOLOGY

## 2018-06-20 ENCOUNTER — DOCUMENTATION ONLY (OUTPATIENT)
Dept: RADIATION ONCOLOGY | Facility: CLINIC | Age: 83
End: 2018-06-20

## 2018-06-20 PROCEDURE — G6002 STEREOSCOPIC X-RAY GUIDANCE: HCPCS | Mod: 26,,, | Performed by: RADIOLOGY

## 2018-06-20 PROCEDURE — 77336 RADIATION PHYSICS CONSULT: CPT | Performed by: RADIOLOGY

## 2018-06-20 PROCEDURE — 77386 HC IMRT, COMPLEX: CPT | Performed by: RADIOLOGY

## 2018-06-21 PROCEDURE — G6002 STEREOSCOPIC X-RAY GUIDANCE: HCPCS | Mod: 26,,, | Performed by: RADIOLOGY

## 2018-06-21 PROCEDURE — 77386 HC IMRT, COMPLEX: CPT | Performed by: RADIOLOGY

## 2018-06-21 NOTE — PLAN OF CARE
Problem: Patient Care Overview  Goal: Plan of Care Review  Outcome: Ongoing (interventions implemented as appropriate)  Day 5 xrt to bladder. Hematuria and foul urine. On Flomax. Will continue to monitor.

## 2018-06-22 PROCEDURE — 77386 HC IMRT, COMPLEX: CPT | Performed by: RADIOLOGY

## 2018-06-22 PROCEDURE — G6002 STEREOSCOPIC X-RAY GUIDANCE: HCPCS | Mod: 26,,, | Performed by: RADIOLOGY

## 2018-06-25 PROCEDURE — G6002 STEREOSCOPIC X-RAY GUIDANCE: HCPCS | Mod: 26,,, | Performed by: RADIOLOGY

## 2018-06-25 PROCEDURE — 77417 THER RADIOLOGY PORT IMAGE(S): CPT | Performed by: RADIOLOGY

## 2018-06-25 PROCEDURE — 77386 HC IMRT, COMPLEX: CPT | Performed by: RADIOLOGY

## 2018-06-26 PROCEDURE — 77386 HC IMRT, COMPLEX: CPT | Performed by: RADIOLOGY

## 2018-06-26 PROCEDURE — G6002 STEREOSCOPIC X-RAY GUIDANCE: HCPCS | Mod: 26,,, | Performed by: RADIOLOGY

## 2018-06-27 ENCOUNTER — DOCUMENTATION ONLY (OUTPATIENT)
Dept: RADIATION ONCOLOGY | Facility: CLINIC | Age: 83
End: 2018-06-27

## 2018-06-27 PROCEDURE — G6002 STEREOSCOPIC X-RAY GUIDANCE: HCPCS | Mod: 26,,, | Performed by: RADIOLOGY

## 2018-06-27 PROCEDURE — 77386 HC IMRT, COMPLEX: CPT | Performed by: RADIOLOGY

## 2018-06-27 NOTE — PLAN OF CARE
Problem: Patient Care Overview  Goal: Plan of Care Review  Outcome: Ongoing (interventions implemented as appropriate)  Day 10 xrt to bladder. Tolerating therapy well. C/o fatigue. Will continue to monitor

## 2018-06-28 PROCEDURE — 77386 HC IMRT, COMPLEX: CPT | Performed by: RADIOLOGY

## 2018-06-28 PROCEDURE — G6002 STEREOSCOPIC X-RAY GUIDANCE: HCPCS | Mod: 26,,, | Performed by: RADIOLOGY

## 2018-06-29 PROCEDURE — 77386 HC IMRT, COMPLEX: CPT | Performed by: RADIOLOGY

## 2018-06-29 PROCEDURE — 77336 RADIATION PHYSICS CONSULT: CPT | Performed by: RADIOLOGY

## 2018-06-29 PROCEDURE — G6002 STEREOSCOPIC X-RAY GUIDANCE: HCPCS | Mod: 26,,, | Performed by: RADIOLOGY

## 2018-07-02 ENCOUNTER — HOSPITAL ENCOUNTER (OUTPATIENT)
Dept: RADIATION THERAPY | Facility: HOSPITAL | Age: 83
Discharge: HOME | End: 2018-07-02
Attending: RADIOLOGY
Payer: MEDICARE

## 2018-07-03 ENCOUNTER — TELEPHONE (OUTPATIENT)
Dept: INTERNAL MEDICINE | Facility: CLINIC | Age: 83
End: 2018-07-03

## 2018-07-03 ENCOUNTER — DOCUMENTATION ONLY (OUTPATIENT)
Dept: RADIATION ONCOLOGY | Facility: CLINIC | Age: 83
End: 2018-07-03

## 2018-07-03 PROCEDURE — G6002 STEREOSCOPIC X-RAY GUIDANCE: HCPCS | Mod: 26,,, | Performed by: RADIOLOGY

## 2018-07-03 PROCEDURE — 77417 THER RADIOLOGY PORT IMAGE(S): CPT | Performed by: RADIOLOGY

## 2018-07-03 PROCEDURE — 77386 HC IMRT, COMPLEX: CPT | Performed by: RADIOLOGY

## 2018-07-03 NOTE — PLAN OF CARE
Problem: Patient Care Overview  Goal: Plan of Care Review  Outcome: Ongoing (interventions implemented as appropriate)  Day 13 xrt to bladder. Stable dysuria. Continue immodium for diarrhea. Handout given. Will continue to monitor.

## 2018-07-03 NOTE — TELEPHONE ENCOUNTER
----- Message from Luz Maria Barros sent at 7/3/2018 10:17 AM CDT -----  Contact: [facility] Ana Schneider  Ms Perez needs some orders signed from back in March, please call her back at [phone]. Thank you

## 2018-07-05 PROCEDURE — 77386 HC IMRT, COMPLEX: CPT | Performed by: RADIOLOGY

## 2018-07-05 PROCEDURE — G6002 STEREOSCOPIC X-RAY GUIDANCE: HCPCS | Mod: 26,,, | Performed by: RADIOLOGY

## 2018-07-06 ENCOUNTER — TELEPHONE (OUTPATIENT)
Dept: INTERNAL MEDICINE | Facility: CLINIC | Age: 83
End: 2018-07-06

## 2018-07-06 PROCEDURE — 77386 HC IMRT, COMPLEX: CPT | Performed by: RADIOLOGY

## 2018-07-06 PROCEDURE — G6002 STEREOSCOPIC X-RAY GUIDANCE: HCPCS | Mod: 26,,, | Performed by: RADIOLOGY

## 2018-07-06 NOTE — TELEPHONE ENCOUNTER
----- Message from Luz Maria Barros sent at 7/6/2018  1:31 PM CDT -----  Contact: Angie Perez [facility]  Ms Ana has orders that need to be signed, please call her back at [phone]. Thank you

## 2018-07-09 PROCEDURE — G6002 STEREOSCOPIC X-RAY GUIDANCE: HCPCS | Mod: 26,,, | Performed by: RADIOLOGY

## 2018-07-09 PROCEDURE — 77386 HC IMRT, COMPLEX: CPT | Performed by: RADIOLOGY

## 2018-07-09 PROCEDURE — 77336 RADIATION PHYSICS CONSULT: CPT | Performed by: RADIOLOGY

## 2018-07-10 PROCEDURE — 77386 HC IMRT, COMPLEX: CPT | Performed by: RADIOLOGY

## 2018-07-10 PROCEDURE — G6002 STEREOSCOPIC X-RAY GUIDANCE: HCPCS | Mod: 26,,, | Performed by: RADIOLOGY

## 2018-07-11 ENCOUNTER — DOCUMENTATION ONLY (OUTPATIENT)
Dept: RADIATION ONCOLOGY | Facility: CLINIC | Age: 83
End: 2018-07-11

## 2018-07-11 PROCEDURE — G6002 STEREOSCOPIC X-RAY GUIDANCE: HCPCS | Mod: 26,,, | Performed by: RADIOLOGY

## 2018-07-11 PROCEDURE — 77417 THER RADIOLOGY PORT IMAGE(S): CPT | Performed by: RADIOLOGY

## 2018-07-11 PROCEDURE — 77386 HC IMRT, COMPLEX: CPT | Performed by: RADIOLOGY

## 2018-07-12 ENCOUNTER — TELEPHONE (OUTPATIENT)
Dept: UROLOGY | Facility: CLINIC | Age: 83
End: 2018-07-12

## 2018-07-12 PROCEDURE — G6002 STEREOSCOPIC X-RAY GUIDANCE: HCPCS | Mod: 26,,, | Performed by: RADIOLOGY

## 2018-07-12 PROCEDURE — 77386 HC IMRT, COMPLEX: CPT | Performed by: RADIOLOGY

## 2018-07-12 NOTE — PLAN OF CARE
Problem: Patient Care Overview  Goal: Plan of Care Review  Outcome: Ongoing (interventions implemented as appropriate)  Day 18 xrt to bladder. C/o nausea after eating. Will use nausea medication. Will continue to monitor.

## 2018-07-12 NOTE — TELEPHONE ENCOUNTER
Returned call to pt and spoke to his daughter; requested to cancel appt for next week as pt was tired and just didn't want to come; stated they were told to have pt come back to clinic 3 months after radiation was done; daughter stated she would call back to reschedule;  appt canceled.

## 2018-07-13 ENCOUNTER — DOCUMENTATION ONLY (OUTPATIENT)
Dept: RADIATION ONCOLOGY | Facility: CLINIC | Age: 83
End: 2018-07-13

## 2018-07-13 PROCEDURE — 77386 HC IMRT, COMPLEX: CPT | Performed by: RADIOLOGY

## 2018-07-13 PROCEDURE — G6002 STEREOSCOPIC X-RAY GUIDANCE: HCPCS | Mod: 26,,, | Performed by: RADIOLOGY

## 2018-07-13 NOTE — PLAN OF CARE
Problem: Patient Care Overview  Goal: Plan of Care Review  Outcome: Outcome(s) achieved Date Met: 07/13/18  Completes xrt today 7-13-18. Will make f/u appt.

## 2018-07-20 ENCOUNTER — TELEPHONE (OUTPATIENT)
Dept: RADIATION ONCOLOGY | Facility: CLINIC | Age: 83
End: 2018-07-20

## 2018-07-20 NOTE — TELEPHONE ENCOUNTER
Made call to see how patient was doing after completing radiation therapy last week. Pt stated he was doing fine and had some diarrhea last night but took imodium and has been ok since he took it this morning. Informed patient to call back with any questions or concerns. Pt verbalized understanding.

## 2018-07-23 PROCEDURE — 77336 RADIATION PHYSICS CONSULT: CPT | Performed by: RADIOLOGY

## 2018-07-25 ENCOUNTER — TELEPHONE (OUTPATIENT)
Dept: INTERNAL MEDICINE | Facility: CLINIC | Age: 83
End: 2018-07-25

## 2018-07-25 NOTE — TELEPHONE ENCOUNTER
----- Message from Candy Fox sent at 7/25/2018 11:00 AM CDT -----  Contact: Yesenia/[facility]  Yesenia called to speak with the nurse; they need to re-cert the patient. Please ask for Eloisa ([facility]) at [phone].    Thanks,  Candy

## 2018-07-26 ENCOUNTER — TELEPHONE (OUTPATIENT)
Dept: UROLOGY | Facility: CLINIC | Age: 83
End: 2018-07-26

## 2018-07-26 NOTE — TELEPHONE ENCOUNTER
----- Message from Jazzmine Finney sent at 7/26/2018 12:01 PM CDT -----  Contact: Raven/pt daughter   Call caller regarding getting pt schedule to have scope done in October.  [phone]

## 2018-07-30 ENCOUNTER — TELEPHONE (OUTPATIENT)
Dept: INTERNAL MEDICINE | Facility: CLINIC | Age: 83
End: 2018-07-30

## 2018-07-30 NOTE — TELEPHONE ENCOUNTER
----- Message from Shaun Barton sent at 7/30/2018 11:44 AM CDT -----  Contact: Yesenia---[facility]   Yesenia called to follow up message that was left 7- have not heard back...[phone] ask for Yesenia or case management..[phone] (home) [phone] (work)

## 2018-08-02 ENCOUNTER — TELEPHONE (OUTPATIENT)
Dept: INTERNAL MEDICINE | Facility: CLINIC | Age: 83
End: 2018-08-02

## 2018-08-02 NOTE — TELEPHONE ENCOUNTER
----- Message from Aidan Judge sent at 8/2/2018  2:34 PM CDT -----  Contact: madhu  Trying to get orders signed and would like to talk to nurse.          [phone]

## 2018-08-10 ENCOUNTER — TELEPHONE (OUTPATIENT)
Dept: ADMINISTRATIVE | Facility: CLINIC | Age: 83
End: 2018-08-10

## 2018-08-10 NOTE — PROGRESS NOTES
Home Health Recert with [facility]. Dr. Jordana Rodriguez.  services.

## 2018-08-10 NOTE — PROGRESS NOTES
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  History prostate cancer radiation approximately 20 years ago.    1. 18 TURBT Dr. Jacobo.  Wide sessile lesion posterior bladder over 8 cm in diameter with polypoid necrotic features.  Lesion resected in its entirety.  Pathology high-grade urothelial carcinoma involving the muscularis propria.  2. 18 PET scan showing no metastatic disease  3. 18 completed 40 Gy in 20 fractions bladder definitive radiation.      CURRENT STATUS: Rajiv Russo is a pleasant 89 y.o. male who presents today for follow-up.  He has been doing well since the completion of radiation and is regaining his strength.  The severe dysuria he had during treatment has resolved.  The foul-smelling urine has resolved.  He has no hematuria.  His energy level is recovering since radiation.  He remains living at home.  He requests a refill on Xanax filled by his cardiologist in January and tells me that helps him sleep on occasion.    PHYSICAL EXAM:   Constitutional: well appearing, no acute distress, ECOG 2 - Ambulates, capable of self care only  Vitals:      Vitals:    18 1049   BP: (!) 100/57   Pulse: 79   Resp: 18   Temp: 98.3 °F (36.8 °C)     Lymphatic: no cervical, supraclavicular adenopathy  Cardiovascular: regular rate, pacemaker, no edema of the upper or lower extremities, radial pulse 2+  Respiratory: unlabored effort, clear to auscultation, no wheezes  Abdomen: soft, non-tender, no rigidity, no masses, no hepatomegaly, no suprapubic tenderness or masses  Spine: non-tender to percussion cervical, thoracic and lumbosacral spine    Laboratory & X-Ray Findings: Per above.      ASSESSMENT:  Recovering well from the acute toxicities of radiation with symptoms suggesting good tumor response    PLAN:  He will need to see Dr. Jacobo in mid October for  cystoscopy 3 months after radiation.  He will need cystoscopy every 3 min to 4 months for the 1st 1-2 years.  He does remain at risk for recurrence given his history of pelvic radiation and the inability to get a very high dose of radiation to the bladder tumor.  I will see him back in 6 months with a CT chest, abdomen pelvis.  The patient and his family and I discussed routine imaging versus imaging as clinically indicated given that he would not likely be a candidate for systemic therapy.  We ultimately decided on at least 1 baseline CT in 6 months, and will go from there.    I recommend he discussed the Xanax with his primary care physician.    SAVANNA Garcia M.D.  Radiation Oncology  [facility] [address] [place] [address]  Ph: [phone]  [email or web address]

## 2018-08-13 ENCOUNTER — OFFICE VISIT (OUTPATIENT)
Dept: RADIATION ONCOLOGY | Facility: CLINIC | Age: 83
End: 2018-08-13
Payer: MEDICARE

## 2018-08-13 ENCOUNTER — TELEPHONE (OUTPATIENT)
Dept: INTERNAL MEDICINE | Facility: CLINIC | Age: 83
End: 2018-08-13

## 2018-08-13 VITALS
OXYGEN SATURATION: 90 % | HEART RATE: 79 BPM | TEMPERATURE: 98 F | WEIGHT: 206.13 LBS | SYSTOLIC BLOOD PRESSURE: 100 MMHG | HEIGHT: 71 IN | DIASTOLIC BLOOD PRESSURE: 57 MMHG | BODY MASS INDEX: 28.86 KG/M2 | RESPIRATION RATE: 18 BRPM

## 2018-08-13 DIAGNOSIS — C67.4 MALIGNANT NEOPLASM OF POSTERIOR WALL OF URINARY BLADDER: Primary | ICD-10-CM

## 2018-08-13 PROCEDURE — 99999 PR PBB SHADOW E&M-EST. PATIENT-LVL IV: CPT | Mod: PBBFAC,,, | Performed by: RADIOLOGY

## 2018-08-13 PROCEDURE — 99214 OFFICE O/P EST MOD 30 MIN: CPT | Mod: S$GLB,,, | Performed by: RADIOLOGY

## 2018-08-13 NOTE — TELEPHONE ENCOUNTER
----- Message from Bessie Villa RN sent at 8/13/2018 11:23 AM CDT -----  Contact: Patient  Patient would like a phone call back to discuss medication refill.

## 2018-08-14 ENCOUNTER — TELEPHONE (OUTPATIENT)
Dept: INTERNAL MEDICINE | Facility: CLINIC | Age: 83
End: 2018-08-14

## 2018-08-14 NOTE — TELEPHONE ENCOUNTER
This medication was prescribed by Dr. Sanchez (given #30 with 5 refills), we have not addressed this with patient. Either have Dr. Sanchez refill or he needs to see us to discuss risks/benefits and decide on ongoing care.

## 2018-08-14 NOTE — TELEPHONE ENCOUNTER
----- Message from Mary Escalera MA sent at 8/14/2018 10:27 AM CDT -----  Contact: ceasar/[facility]      ----- Message -----  From: Jaida Kearns  Sent: 8/14/2018   8:18 AM  To: Laura CLAYTON Staff    xanax refill needed asap. pls call when done...[phone]      [facility] [address]  [address] [address] [facility] [address]  Phone: [phone] Fax: [phone]

## 2018-08-14 NOTE — TELEPHONE ENCOUNTER
Patient has not been seen by Dr. Sanchez since 8/3/17. Patient will need appointment with PCP. I called to patient to schedule--spoke with Meagan, patient's caregiver--states patient is asleep--will give message to call and schedule appointment with Dr. Rodriugez

## 2018-08-15 NOTE — TELEPHONE ENCOUNTER
spoke with patient and someone scheduled the appointment for patient for tomorrow. Patient verbalized understanding.

## 2018-08-16 ENCOUNTER — TELEPHONE (OUTPATIENT)
Dept: CARDIOLOGY | Facility: CLINIC | Age: 83
End: 2018-08-16

## 2018-08-16 ENCOUNTER — OFFICE VISIT (OUTPATIENT)
Dept: INTERNAL MEDICINE | Facility: CLINIC | Age: 83
End: 2018-08-16
Payer: MEDICARE

## 2018-08-16 VITALS
HEART RATE: 72 BPM | OXYGEN SATURATION: 94 % | SYSTOLIC BLOOD PRESSURE: 106 MMHG | WEIGHT: 205.94 LBS | HEIGHT: 71 IN | BODY MASS INDEX: 28.83 KG/M2 | DIASTOLIC BLOOD PRESSURE: 68 MMHG | TEMPERATURE: 97 F

## 2018-08-16 DIAGNOSIS — I50.22 CHRONIC SYSTOLIC CHF (CONGESTIVE HEART FAILURE): ICD-10-CM

## 2018-08-16 DIAGNOSIS — G47.00 INSOMNIA, UNSPECIFIED TYPE: Primary | ICD-10-CM

## 2018-08-16 DIAGNOSIS — Z23 NEED FOR 23-POLYVALENT PNEUMOCOCCAL POLYSACCHARIDE VACCINE: ICD-10-CM

## 2018-08-16 PROCEDURE — 99999 PR PBB SHADOW E&M-EST. PATIENT-LVL III: CPT | Mod: PBBFAC,,, | Performed by: FAMILY MEDICINE

## 2018-08-16 PROCEDURE — 90732 PPSV23 VACC 2 YRS+ SUBQ/IM: CPT | Mod: S$GLB,,, | Performed by: FAMILY MEDICINE

## 2018-08-16 PROCEDURE — G0009 ADMIN PNEUMOCOCCAL VACCINE: HCPCS | Mod: S$GLB,,, | Performed by: FAMILY MEDICINE

## 2018-08-16 PROCEDURE — 99214 OFFICE O/P EST MOD 30 MIN: CPT | Mod: 25,S$GLB,, | Performed by: FAMILY MEDICINE

## 2018-08-16 RX ORDER — HYDROCORTISONE 10 MG/ML
LOTION TOPICAL
COMMUNITY
Start: 2017-05-24

## 2018-08-16 RX ORDER — LANOLIN ALCOHOL/MO/W.PET/CERES
1000 CREAM (GRAM) TOPICAL
COMMUNITY
End: 2018-11-05

## 2018-08-16 RX ORDER — FENOFIBRATE 160 MG/1
160 TABLET ORAL
COMMUNITY
End: 2018-10-25 | Stop reason: SDUPTHER

## 2018-08-16 RX ORDER — ATORVASTATIN CALCIUM 20 MG/1
20 TABLET, FILM COATED ORAL
COMMUNITY
End: 2018-11-05 | Stop reason: SDUPTHER

## 2018-08-16 RX ORDER — POTASSIUM CHLORIDE 750 MG/1
10 TABLET, EXTENDED RELEASE ORAL
COMMUNITY
End: 2018-11-05 | Stop reason: SDUPTHER

## 2018-08-16 RX ORDER — NITROGLYCERIN 6.5 MG/1
6.5 CAPSULE ORAL
COMMUNITY
End: 2018-11-05 | Stop reason: SDUPTHER

## 2018-08-16 RX ORDER — ALPRAZOLAM 0.5 MG/1
0.5 TABLET ORAL NIGHTLY PRN
Qty: 30 TABLET | Refills: 5 | Status: SHIPPED | OUTPATIENT
Start: 2018-08-16

## 2018-08-16 RX ORDER — BUMETANIDE 2 MG/1
2 TABLET ORAL
COMMUNITY
End: 2018-11-05 | Stop reason: SDUPTHER

## 2018-08-16 RX ORDER — GLIPIZIDE 2.5 MG/1
2.5 TABLET, EXTENDED RELEASE ORAL
COMMUNITY
End: 2018-11-05 | Stop reason: SDUPTHER

## 2018-08-16 RX ORDER — INSULIN GLARGINE 100 [IU]/ML
15 INJECTION, SOLUTION SUBCUTANEOUS
COMMUNITY

## 2018-08-16 NOTE — TELEPHONE ENCOUNTER
Called pt daughter Cintia to clarify pt's medication dose for Bumex and Potassium states she will give me a call back once she calls her sister to confirm medication doses.

## 2018-08-16 NOTE — TELEPHONE ENCOUNTER
----- Message from Mona Gonzalez sent at 8/16/2018 12:02 PM CDT -----  Contact: Cintia (pt's daughter)   Cintia called and stated she was returning a call to the nurse. She can be reached at [phone].    Thanks,  TF

## 2018-08-16 NOTE — TELEPHONE ENCOUNTER
----- Message from Jordana Rodriguez MD sent at 8/16/2018 11:26 AM CDT -----  Regarding: Med Refill  Patient seen today. Requesting refills of bumex and potassium. Can you send those to pharmacy for patient? [facility].     Thanks

## 2018-08-16 NOTE — ASSESSMENT & PLAN NOTE
Continue Xanax 0.5 mg nightly as needed, discussed risk and benefits of this medication including increased risk for falling.  Patient and daughter expressed understanding and acceptance of risk.

## 2018-08-16 NOTE — PROGRESS NOTES
Subjective:       Patient ID: Rajiv Russo is a 89 y.o. male.    Chief Complaint: Medication Refill    Patient presents to clinic today with his daughter for medication refill.  Daughter reports he has been taking alprazolam 0.5 mg nightly to help him sleep with good results.  They report the patient tolerates the medication reasonably well.   She also reports he needs refills of bumex and potassium. They are otherwise without concerns today.      Review of Systems   Constitutional: Negative for chills, fatigue, fever and unexpected weight change.   Eyes: Negative for visual disturbance.   Respiratory: Negative for shortness of breath.    Cardiovascular: Negative for chest pain.   Musculoskeletal: Negative for myalgias.   Neurological: Negative for headaches.       Objective:      Physical Exam   Constitutional: He is oriented to person, place, and time. He appears well-developed and well-nourished. No distress.   HENT:   Head: Normocephalic and atraumatic.   Eyes: Conjunctivae and EOM are normal. Pupils are equal, round, and reactive to light. No scleral icterus.   Pulmonary/Chest: Effort normal.   Neurological: He is alert and oriented to person, place, and time.   Ambulates with walker     Psychiatric: He has a normal mood and affect.   Vitals reviewed.      Assessment:       1. Insomnia, unspecified type    2. Need for 23-polyvalent pneumococcal polysaccharide vaccine        Plan:     Problem List Items Addressed This Visit     Insomnia - Primary    Current Assessment & Plan     Continue Xanax 0.5 mg nightly as needed, discussed risk and benefits of this medication including increased risk for falling.  Patient and daughter expressed understanding and acceptance of risk.         Relevant Medications    ALPRAZolam (XANAX) 0.5 MG tablet      Other Visit Diagnoses     Need for 23-polyvalent pneumococcal polysaccharide vaccine        Relevant Orders    (In Office Administered) Pneumococcal Polysaccharide  Vaccine (23 Valent) (SQ/IM) (Completed)          Health Maintenance reviewed/updated.

## 2018-08-20 DIAGNOSIS — I50.23 ACUTE ON CHRONIC SYSTOLIC (CONGESTIVE) HEART FAILURE: ICD-10-CM

## 2018-08-20 NOTE — TELEPHONE ENCOUNTER
----- Message from Carri Verdugo sent at 8/20/2018 12:01 PM CDT -----  Contact: wife  States the pt needs a refill on Metolazone 5mg, the pt can be reached at [phone]///[id number]

## 2018-08-21 ENCOUNTER — TELEPHONE (OUTPATIENT)
Dept: INTERNAL MEDICINE | Facility: CLINIC | Age: 83
End: 2018-08-21

## 2018-08-21 RX ORDER — METOLAZONE 5 MG/1
5 TABLET ORAL DAILY PRN
Qty: 30 TABLET | Refills: 11
Start: 2018-08-21 | End: 2018-08-29 | Stop reason: SDUPTHER

## 2018-08-21 NOTE — TELEPHONE ENCOUNTER
----- Message from Jordana Rodriguez MD sent at 8/21/2018  7:18 AM CDT -----  Contact: [facility]-[phone]  I saw paperwork from folder this morning. How often does he test?  Thank you.  ----- Message -----  From: Bindu Bedolla MA  Sent: 8/20/2018   3:01 PM  To: Jordana Rodriguez MD        ----- Message -----  From: Apple Oviedo  Sent: 8/20/2018  11:36 AM  To: Michael LIGHT Staff    Would like to consult with nurse regarding status of authorization for test strips.  Please call back at [phone].  Md Adiel

## 2018-08-29 DIAGNOSIS — I50.23 ACUTE ON CHRONIC SYSTOLIC (CONGESTIVE) HEART FAILURE: ICD-10-CM

## 2018-08-29 RX ORDER — METOLAZONE 5 MG/1
TABLET ORAL
Qty: 30 TABLET | Refills: 11 | Status: SHIPPED | OUTPATIENT
Start: 2018-08-29

## 2018-09-26 ENCOUNTER — CLINICAL SUPPORT (OUTPATIENT)
Dept: CARDIOLOGY | Facility: CLINIC | Age: 83
End: 2018-09-26
Attending: INTERNAL MEDICINE
Payer: MEDICARE

## 2018-09-26 DIAGNOSIS — I44.2 CHB (COMPLETE HEART BLOCK): ICD-10-CM

## 2018-09-26 DIAGNOSIS — Z95.0 CARDIAC PACEMAKER IN SITU: ICD-10-CM

## 2018-09-26 PROCEDURE — 93280 PM DEVICE PROGR EVAL DUAL: CPT | Mod: PBBFAC | Performed by: INTERNAL MEDICINE

## 2018-10-02 ENCOUNTER — TELEPHONE (OUTPATIENT)
Dept: INTERNAL MEDICINE | Facility: CLINIC | Age: 83
End: 2018-10-02

## 2018-10-02 NOTE — TELEPHONE ENCOUNTER
----- Message from Jazzmine Finney sent at 10/2/2018  9:26 AM CDT -----  Caller states that on 09/17/18 a order was fax over to continue home health. Caller states that she need a verbal order or a to get fax returned.    [phone] ask for clinical manager. Fax number is [phone]

## 2018-10-02 NOTE — TELEPHONE ENCOUNTER
Spoke with clinical manager in reference to re certification to be signed by provider.  States they do not use the home health portal. Will fax

## 2018-10-03 NOTE — TELEPHONE ENCOUNTER
Spoke with Omar informed to fax home health orders to [facility] at [phone]. Voiced understanding.

## 2018-10-04 ENCOUNTER — TELEPHONE (OUTPATIENT)
Dept: INTERNAL MEDICINE | Facility: CLINIC | Age: 83
End: 2018-10-04

## 2018-10-04 NOTE — TELEPHONE ENCOUNTER
----- Message from Ghislaine Alvarez sent at 10/4/2018  2:51 PM CDT -----  Contact: Dawson(Kailee)[phone]  Would like to consult with nurse regarding to set up a peer to peer with Mr Russo. Dawson states you have 24 hour before denial, please call back at [phone]. Thanks/ar

## 2018-10-04 NOTE — TELEPHONE ENCOUNTER
Returned call to [employer]  Kailee  States pt needs peer to peer for home health Kailee states  it was requested for 9 additional nurse visit only 2 was approved and peer to peer has to be done by 10/5/2018 Kailee also  states the peer to peer can't be done on today.    Advised Kailee Rodriguez will be back in office 10/09/18.

## 2018-10-12 NOTE — TELEPHONE ENCOUNTER
Okay, please reach out to patient/family and find out if they feel like he needs any home health services. If so, what kinds of services do they feel he needs? Thank you.

## 2018-10-12 NOTE — TELEPHONE ENCOUNTER
Please find out if this still needs to be done, if so maybe we can schedule for next Wednesday or Thursday. Thank you.

## 2018-10-12 NOTE — TELEPHONE ENCOUNTER
Tevan with [employer] states it was denied but can be appealed or [facility] can re submit for approval.

## 2018-10-15 ENCOUNTER — OFFICE VISIT (OUTPATIENT)
Dept: UROLOGY | Facility: CLINIC | Age: 83
End: 2018-10-15
Payer: MEDICARE

## 2018-10-15 VITALS — WEIGHT: 205.94 LBS | BODY MASS INDEX: 28.83 KG/M2 | HEIGHT: 71 IN

## 2018-10-15 DIAGNOSIS — C67.9 MALIGNANT NEOPLASM OF URINARY BLADDER, UNSPECIFIED SITE: Primary | ICD-10-CM

## 2018-10-15 LAB
BILIRUB SERPL-MCNC: NORMAL MG/DL
BLOOD URINE, POC: 50
COLOR, POC UA: YELLOW
GLUCOSE UR QL STRIP: NORMAL
KETONES UR QL STRIP: NORMAL
LEUKOCYTE ESTERASE URINE, POC: NORMAL
NITRITE, POC UA: NORMAL
PH, POC UA: 7
PROTEIN, POC: NORMAL
SPECIFIC GRAVITY, POC UA: 1.01
UROBILINOGEN, POC UA: NORMAL

## 2018-10-15 PROCEDURE — 81002 URINALYSIS NONAUTO W/O SCOPE: CPT | Mod: PBBFAC | Performed by: UROLOGY

## 2018-10-15 PROCEDURE — 99499 UNLISTED E&M SERVICE: CPT | Mod: S$GLB,,, | Performed by: UROLOGY

## 2018-10-15 PROCEDURE — 52000 CYSTOURETHROSCOPY: CPT | Mod: PBBFAC | Performed by: UROLOGY

## 2018-10-15 PROCEDURE — 99499 UNLISTED E&M SERVICE: CPT | Mod: S$PBB,,, | Performed by: UROLOGY

## 2018-10-15 PROCEDURE — 99999 PR PBB SHADOW E&M-EST. PATIENT-LVL III: CPT | Mod: PBBFAC,,, | Performed by: UROLOGY

## 2018-10-15 PROCEDURE — 52000 CYSTOURETHROSCOPY: CPT | Mod: S$PBB,,, | Performed by: UROLOGY

## 2018-10-15 PROCEDURE — 99213 OFFICE O/P EST LOW 20 MIN: CPT | Mod: PBBFAC | Performed by: UROLOGY

## 2018-10-15 NOTE — PROGRESS NOTES
Chief Complaint: T2 Bladder Cancer    HPI:   10/15/18: XRT completed, here for surveillance cystoscopy.  White shaggy mass at dome and right trigone.   6/11/18: Cysto today shows recurrent large anterior bladder mass.  5/10/18: TURBT complete, arboleda out, voiding okay.  Having OAB symptoms we reviewed in detail.  4/1/18: US suggests a bladder mass. Cysto confirms. Likely complicaiton of prior XRT.  3/13/18: 89 yo man had bladder cancer dx 40 years ago and Dr. Andersen in [place] looked in his bladder a year or two; last time a bladder lesion was seen was 15-20 years ago.  Had XRT for prostate cancer early 80's.  Had gross hematuria recently and was sent for evaluation of that.  Has had a lot of UCx sent over last year never had a UTI.  No abd/pelvic pain and no exac/rel factors.  No urolithiasis.  No urinary bother other than frequency.  No  history.  Normal sexual function.    Allergies:  Patient has no known allergies.    Medications:  has a current medication list which includes the following prescription(s): alprazolam, aspirin, atorvastatin, atorvastatin, bd ultra-fine mini pen needle, bumetanide, bumetanide, cyanocobalamin, cyanocobalamin, docusate sodium, fenofibrate, fenofibrate, glipizide, glipizide, humalog kwikpen insulin, hydrocortisone, insulin glargine, insulin glargine, metolazone, metoprolol succinate, mupirocin, nitroglycerin, nitroglycerin, ondansetron, oxycodone-acetaminophen, potassium chloride, potassium chloride, promethazine, spiriva with handihaler, tamsulosin, and vit a/vit c/vit e/zinc/copper.    Review of Systems:  General: No fever, chills, fatigability, or weight loss.  Skin: No rashes, itching, or changes in color or texture of skin.  Chest: Denies BORGES, cyanosis, wheezing, cough, and sputum production.  Abdomen: Appetite fine. No weight loss. Denies diarrhea, abdominal pain, hematemesis, or blood in stool.  Musculoskeletal: No joint stiffness or swelling. Denies back pain.  : As  above.  All other review of systems negative.    PMH:   has a past medical history of Acute coronary syndrome, Bladder cancer, Bradycardia, CHF (congestive heart failure), CKD (chronic kidney disease) stage 3, GFR 30-59 ml/min, Coronary artery disease, Diabetes mellitus, Heart attack, Hyperlipidemia, Hypertension, Macular degeneration, and Pacemaker.    PSH:   has a past surgical history that includes colon removal. ; Bladder surgery; Cataract extraction; Cardiac pacemaker placement; and EXCISION-BLADDER TUMOR-TRANSURETHRAL (TURBT) (N/A, 4/17/2018).    FamHx: family history includes No Known Problems in his brother, father, maternal aunt, maternal grandfather, maternal grandmother, maternal uncle, mother, paternal aunt, paternal grandfather, paternal grandmother, paternal uncle, and sister.    SocHx:  reports that he has quit smoking. He has quit using smokeless tobacco. He reports that he does not drink alcohol or use drugs.      Physical Exam:  There were no vitals filed for this visit.  General: A&Ox3, no apparent distress, no deformities  Neck: No masses, normal thyroid  Lungs: normal inspiration, no use of accessory muscles  Heart: normal pulse, no arrhythmias  Abdomen: Soft, NT, ND  Skin: The skin is warm and dry. No jaundice.  Ext: No c/c/e.  :   4/18: Test desc mehdi, no abnormalities of epididymus. Penis normal, with normal penile and scrotal skin. Meatus normal.     Labs/Studies:   Bladder Scan performed in office:     4/18: PVR 0 ml.    Procedure: Diagnostic Cystoscopy    Procedure in Detail: After proper consents were obtained, the patient was prepped and draped in normal sterile fashion for diagnostic cystoscopy. 5 ml of lidocaine jelly was instilled in the urethra. The flexible cystoscope was then introduced into the urethra, and advanced into the bladder under direct vision. The urethral mucosa appeared normal, and no strictures were noted. The sphincter appeared to be normal, and the veru montanum  was unremarkable. The prostatic mucosa and the lateral lobes of the prostate were normal. The bladder neck was normal. Inspection of the interior of the bladder was then carried out. The trigone was unremarkable, with no mucosal lesions. The ureteral orifices were normal in position and configuration. Systematic inspection of the mucosa of the bladder it was then carried out, rotating the cystoscope so that all areas of the left and right lateral walls, the dome of the bladder, and the posterior wall were all visualized. The cystoscope was then advanced further into the bladder, and maximum deflection of the scope was performed so that the bladder neck could be inspected. No mucosal lesions were noted there. The cystoscope was then removed, and the procedure terminated.     Findings: white shaggy mass at dome and at right trigone, much smaller than before, rest of bladder normal    Impression/Plan:   1. T2 bladder cancer, unfavorable for cystoprostatectomy (age, prior XRT, cardiac condition).  Uncurable most likely and prognosis dim.  Anesthesia risk very high.  2. If outlet obstruction occurs in an inoperable fashion would move to bilateral nephrostomy and not SP tube.  3. Will manage expectantly.    4. RTC cysto 3 mo

## 2018-10-25 DIAGNOSIS — E78.5 HYPERLIPIDEMIA ASSOCIATED WITH TYPE 2 DIABETES MELLITUS: ICD-10-CM

## 2018-10-25 DIAGNOSIS — E11.69 HYPERLIPIDEMIA ASSOCIATED WITH TYPE 2 DIABETES MELLITUS: ICD-10-CM

## 2018-10-25 RX ORDER — FENOFIBRATE 160 MG/1
160 TABLET ORAL DAILY
Qty: 90 TABLET | Refills: 0 | Status: SHIPPED | OUTPATIENT
Start: 2018-10-25

## 2018-10-25 RX ORDER — GLIPIZIDE 2.5 MG/1
2.5 TABLET, EXTENDED RELEASE ORAL 2 TIMES DAILY WITH MEALS
Qty: 180 TABLET | Refills: 0 | Status: SHIPPED | OUTPATIENT
Start: 2018-10-25

## 2018-10-25 NOTE — TELEPHONE ENCOUNTER
----- Message from Dolores Melendez sent at 10/25/2018 11:34 AM CDT -----  Contact: Raven- daughter  Refill request. Pt need new prescription. Please call back at [facility] [phone].     1. What is the name of the medication you are requesting? glipizide xi/ fenofibrate    2. What is the dose? n/a  3. How do you take the medication? Orally, topically, etc? orally  4. How often do you take this medication? n/a  5. Do you need a 30 day or 90 day supply? n/a  6. How many refills are you requesting? n/a  7. What is your preferred pharmacy and location of the pharmacy?     Pt uses:    [facility]  [phone]      8. Who can we contact with further questions? N/a    Thanks,   Dolores Melendez

## 2018-11-05 ENCOUNTER — OFFICE VISIT (OUTPATIENT)
Dept: INTERNAL MEDICINE | Facility: CLINIC | Age: 83
End: 2018-11-05
Payer: MEDICARE

## 2018-11-05 ENCOUNTER — TELEPHONE (OUTPATIENT)
Dept: INTERNAL MEDICINE | Facility: CLINIC | Age: 83
End: 2018-11-05

## 2018-11-05 ENCOUNTER — LAB VISIT (OUTPATIENT)
Dept: LAB | Facility: HOSPITAL | Age: 83
End: 2018-11-05
Attending: FAMILY MEDICINE
Payer: MEDICARE

## 2018-11-05 VITALS
TEMPERATURE: 98 F | HEIGHT: 71 IN | HEART RATE: 70 BPM | DIASTOLIC BLOOD PRESSURE: 58 MMHG | SYSTOLIC BLOOD PRESSURE: 134 MMHG | OXYGEN SATURATION: 95 % | BODY MASS INDEX: 28.7 KG/M2 | WEIGHT: 205 LBS

## 2018-11-05 DIAGNOSIS — G47.00 INSOMNIA, UNSPECIFIED TYPE: ICD-10-CM

## 2018-11-05 DIAGNOSIS — R31.0 GROSS HEMATURIA: ICD-10-CM

## 2018-11-05 DIAGNOSIS — N30.01 ACUTE CYSTITIS WITH HEMATURIA: ICD-10-CM

## 2018-11-05 DIAGNOSIS — N18.30 CKD (CHRONIC KIDNEY DISEASE) STAGE 3, GFR 30-59 ML/MIN: ICD-10-CM

## 2018-11-05 DIAGNOSIS — E11.9 TYPE 2 DIABETES MELLITUS WITHOUT COMPLICATION, WITHOUT LONG-TERM CURRENT USE OF INSULIN: Primary | ICD-10-CM

## 2018-11-05 DIAGNOSIS — E11.9 TYPE 2 DIABETES MELLITUS WITHOUT COMPLICATION, WITHOUT LONG-TERM CURRENT USE OF INSULIN: ICD-10-CM

## 2018-11-05 LAB
ANION GAP SERPL CALC-SCNC: 7 MMOL/L
BACTERIA #/AREA URNS HPF: ABNORMAL /HPF
BASOPHILS # BLD AUTO: 0.02 K/UL
BASOPHILS NFR BLD: 0.3 %
BILIRUB UR QL STRIP: NEGATIVE
BUN SERPL-MCNC: 33 MG/DL
CALCIUM SERPL-MCNC: 9 MG/DL
CHLORIDE SERPL-SCNC: 93 MMOL/L
CLARITY UR: ABNORMAL
CO2 SERPL-SCNC: 37 MMOL/L
COLOR UR: ABNORMAL
CREAT SERPL-MCNC: 1.7 MG/DL
DIFFERENTIAL METHOD: ABNORMAL
EOSINOPHIL # BLD AUTO: 0.2 K/UL
EOSINOPHIL NFR BLD: 2.6 %
ERYTHROCYTE [DISTWIDTH] IN BLOOD BY AUTOMATED COUNT: 15.3 %
EST. GFR  (AFRICAN AMERICAN): 37.7 ML/MIN/1.73 M^2
EST. GFR  (NON AFRICAN AMERICAN): 32.6 ML/MIN/1.73 M^2
ESTIMATED AVG GLUCOSE: 151 MG/DL
GLUCOSE SERPL-MCNC: 227 MG/DL
GLUCOSE UR QL STRIP: NEGATIVE
HBA1C MFR BLD HPLC: 6.9 %
HCT VFR BLD AUTO: 40.9 %
HGB BLD-MCNC: 12.2 G/DL
HGB UR QL STRIP: ABNORMAL
HYALINE CASTS #/AREA URNS LPF: ABNORMAL /LPF
IMM GRANULOCYTES # BLD AUTO: 0.03 K/UL
IMM GRANULOCYTES NFR BLD AUTO: 0.4 %
KETONES UR QL STRIP: NEGATIVE
LEUKOCYTE ESTERASE UR QL STRIP: ABNORMAL
LYMPHOCYTES # BLD AUTO: 0.8 K/UL
LYMPHOCYTES NFR BLD: 10.2 %
MCH RBC QN AUTO: 27.5 PG
MCHC RBC AUTO-ENTMCNC: 29.8 G/DL
MCV RBC AUTO: 92 FL
MICROSCOPIC COMMENT: ABNORMAL
MONOCYTES # BLD AUTO: 0.8 K/UL
MONOCYTES NFR BLD: 10.2 %
NEUTROPHILS # BLD AUTO: 5.7 K/UL
NEUTROPHILS NFR BLD: 76.3 %
NITRITE UR QL STRIP: POSITIVE
NRBC BLD-RTO: 0 /100 WBC
PH UR STRIP: 6 [PH] (ref 5–8)
PLATELET # BLD AUTO: 209 K/UL
PMV BLD AUTO: 10.6 FL
POTASSIUM SERPL-SCNC: 4 MMOL/L
PROT UR QL STRIP: ABNORMAL
RBC # BLD AUTO: 4.43 M/UL
RBC #/AREA URNS HPF: >100 /HPF (ref 0–4)
SODIUM SERPL-SCNC: 137 MMOL/L
SP GR UR STRIP: 1.01 (ref 1–1.03)
URN SPEC COLLECT METH UR: ABNORMAL
WBC # BLD AUTO: 7.42 K/UL
WBC #/AREA URNS HPF: >100 /HPF (ref 0–5)
YEAST URNS QL MICRO: ABNORMAL

## 2018-11-05 PROCEDURE — 99214 OFFICE O/P EST MOD 30 MIN: CPT | Mod: HCNC,25,S$GLB, | Performed by: NURSE PRACTITIONER

## 2018-11-05 PROCEDURE — 99999 PR PBB SHADOW E&M-EST. PATIENT-LVL V: CPT | Mod: PBBFAC,HCNC,, | Performed by: NURSE PRACTITIONER

## 2018-11-05 PROCEDURE — 87088 URINE BACTERIA CULTURE: CPT | Mod: HCNC

## 2018-11-05 PROCEDURE — 87077 CULTURE AEROBIC IDENTIFY: CPT | Mod: HCNC

## 2018-11-05 PROCEDURE — G0008 ADMIN INFLUENZA VIRUS VAC: HCPCS | Mod: HCNC,S$GLB,, | Performed by: NURSE PRACTITIONER

## 2018-11-05 PROCEDURE — 36415 COLL VENOUS BLD VENIPUNCTURE: CPT | Mod: HCNC

## 2018-11-05 PROCEDURE — 1100F PTFALLS ASSESS-DOCD GE2>/YR: CPT | Mod: CPTII,HCNC,S$GLB, | Performed by: NURSE PRACTITIONER

## 2018-11-05 PROCEDURE — 81000 URINALYSIS NONAUTO W/SCOPE: CPT | Mod: HCNC

## 2018-11-05 PROCEDURE — 85025 COMPLETE CBC W/AUTO DIFF WBC: CPT | Mod: HCNC

## 2018-11-05 PROCEDURE — 80048 BASIC METABOLIC PNL TOTAL CA: CPT | Mod: HCNC

## 2018-11-05 PROCEDURE — 83036 HEMOGLOBIN GLYCOSYLATED A1C: CPT | Mod: HCNC

## 2018-11-05 PROCEDURE — 99499 UNLISTED E&M SERVICE: CPT | Mod: S$GLB,,, | Performed by: NURSE PRACTITIONER

## 2018-11-05 PROCEDURE — 90662 IIV NO PRSV INCREASED AG IM: CPT | Mod: HCNC,S$GLB,, | Performed by: NURSE PRACTITIONER

## 2018-11-05 PROCEDURE — 3288F FALL RISK ASSESSMENT DOCD: CPT | Mod: CPTII,HCNC,S$GLB, | Performed by: NURSE PRACTITIONER

## 2018-11-05 PROCEDURE — 87086 URINE CULTURE/COLONY COUNT: CPT | Mod: HCNC

## 2018-11-05 RX ORDER — CIPROFLOXACIN 250 MG/1
250 TABLET, FILM COATED ORAL 2 TIMES DAILY
Qty: 14 TABLET | Refills: 0 | Status: SHIPPED | OUTPATIENT
Start: 2018-11-05 | End: 2018-11-13 | Stop reason: ALTCHOICE

## 2018-11-05 NOTE — TELEPHONE ENCOUNTER
Returned pt call states in previous call was informed of appt with Arcelia Finney np  And didn't need anything at this time.

## 2018-11-05 NOTE — TELEPHONE ENCOUNTER
Patient/caregiver notified of results and of the prescription that was sent to the pharmacy. Advised to call the office after medication is complete to schedule appointment for followup, patients caregiver verbalized understanding.

## 2018-11-05 NOTE — PROGRESS NOTES
Rajiv Russo  11/05/2018  [id number]    Jordana Rodriguez MD  Patient Care Team:  Jordana Rodriguez MD as PCP - General (Family Medicine)  Arcelia Finney NP as Nurse Practitioner (Internal Medicine)  Sapphire Mae LPN as Care Coordinator (Internal Medicine)  Has the patient seen any provider outside of the [facility] network since the last visit? (no). If yes, HIPPA forms completed and records requested.        Visit Type:a scheduled routine follow-up visit    Chief Complaint:  Chief Complaint   Patient presents with    painful urination/blood in urine       History of Present Illness:    Patient presents to clinic today for follow up of chronic conditions including DMII, insomnia, and CKD. He reports gross hematuria in past almost two weeks. He states it is worse in the morning but present all day. He states it began after last visit/procedure with Dr. Jacobo 10/15.    History:  Past Medical History:   Diagnosis Date    Acute coronary syndrome     Bladder cancer     Bradycardia     CHF (congestive heart failure)     CKD (chronic kidney disease) stage 3, GFR 30-59 ml/min     Coronary artery disease     Diabetes mellitus     Heart attack     Hyperlipidemia     Hypertension     Macular degeneration     Pacemaker      Past Surgical History:   Procedure Laterality Date    BLADDER SURGERY      x2    CARDIAC PACEMAKER PLACEMENT      CATARACT EXTRACTION      colon removal.       EXCISION-BLADDER TUMOR-TRANSURETHRAL (TURBT) N/A 4/17/2018    Performed by John Jacobo IV, MD at [facility]     Family History   Problem Relation Age of Onset    No Known Problems Mother     No Known Problems Father     No Known Problems Sister     No Known Problems Brother     No Known Problems Maternal Aunt     No Known Problems Maternal Uncle     No Known Problems Paternal Aunt     No Known Problems Paternal Uncle     No Known Problems Maternal Grandmother     No Known Problems Maternal Grandfather      No Known Problems Paternal Grandmother     No Known Problems Paternal Grandfather     Amblyopia Neg Hx     Blindness Neg Hx     Cancer Neg Hx     Cataracts Neg Hx     Diabetes Neg Hx     Glaucoma Neg Hx     Hypertension Neg Hx     Macular degeneration Neg Hx     Retinal detachment Neg Hx     Strabismus Neg Hx     Stroke Neg Hx     Thyroid disease Neg Hx      Social History     Socioeconomic History    Marital status:      Spouse name: Not on file    Number of children: Not on file    Years of education: Not on file    Highest education level: Not on file   Social Needs    Financial resource strain: Not on file    Food insecurity - worry: Not on file    Food insecurity - inability: Not on file    Transportation needs - medical: Not on file    Transportation needs - non-medical: Not on file   Occupational History    Occupation: retired [employer]   Tobacco Use    Smoking status: Former Smoker    Smokeless tobacco: Former User   Substance and Sexual Activity    Alcohol use: No    Drug use: No    Sexual activity: No   Other Topics Concern    Not on file   Social History Narrative    Not on file     Patient Active Problem List   Diagnosis    Chronic respiratory failure with hypoxia and hypercapnia    Mixed type COPD (chronic obstructive pulmonary disease)    CAD (coronary artery disease)    DM type 2, uncontrolled, with renal complications    HTN (hypertension)    Anemia    CKD (chronic kidney disease) stage 3, GFR 30-59 ml/min    Troponin I above reference range    Nonexudative senile macular degeneration of retina    Type 2 diabetes mellitus without complication    Ischemic chest pain    Angina of effort    Nonrheumatic aortic valve stenosis    Cardiomyopathy    Unstable angina    Chronic systolic CHF (congestive heart failure), NYHA class 3    Cellulitis of leg    Dyspnea and respiratory abnormalities    Hyperlipidemia associated with type 2 diabetes  "mellitus    Diabetes mellitus type 2 in nonobese    Malignant neoplasm of posterior wall of urinary bladder    Insomnia     Review of patient's allergies indicates:  No Known Allergies    The following were reviewed at this visit: active problem list, medication list, allergies, family history, social history, and health maintenance.    Medications:  Current Outpatient Medications on File Prior to Visit   Medication Sig Dispense Refill    ALPRAZolam (XANAX) 0.5 MG tablet Take 1 tablet (0.5 mg total) by mouth nightly as needed for Insomnia. 30 tablet 5    aspirin (ECOTRIN) 81 MG EC tablet Take 81 mg by mouth once daily.      atorvastatin (LIPITOR) 20 MG tablet Take 1 tablet (20 mg total) by mouth every evening. 90 tablet 3    BD INSULIN PEN NEEDLE UF MINI 31 gauge x 3/16" Ndle       bumetanide (BUMEX) 2 MG tablet Take 1 tablet twice daily x3 days then 1 tab twice daily on MW (Patient taking differently: Take 2 mg by mouth once daily. Take 1 tablet twice daily x3 days then 1 tab twice daily on MW) 120 tablet 3    cyanocobalamin 1,000 mcg/mL injection 1,000 mcg.       docusate sodium (COLACE) 100 MG capsule Take 1 capsule (100 mg total) by mouth 2 (two) times daily. 60 capsule 0    fenofibrate 160 MG Tab Take 1 tablet (160 mg total) by mouth once daily. 90 tablet 0    glipiZIDE (GLUCOTROL) 2.5 MG TR24 Take 1 tablet (2.5 mg total) by mouth 2 (two) times daily with meals. 180 tablet 0    HUMALOG KWIKPEN 100 unit/mL InPn pen Take 4 Units by mouth once daily.      hydrocortisone 1 % lotion Apply topically.      metOLazone (ZAROXOLYN) 5 MG tablet TAKE ONE TABLET BY MOUTH ONCE DAILY 30 tablet 11    metoprolol succinate (TOPROL-XL) 25 MG 24 hr tablet Take 1 tablet (25 mg total) by mouth once daily. (Patient taking differently: Take 25 mg by mouth every evening. ) 90 tablet 3    mupirocin (BACTROBAN) 2 % ointment Apply topically 3 (three) times daily. 22 g 0    nitroGLYCERIN (NITROSTAT) 0.4 MG SL tablet Place " 1 tablet (0.4 mg total) under the tongue every 5 (five) minutes as needed for Chest pain. 30 tablet 6    potassium chloride (KLOR-CON) 10 MEQ TbSR Take 10 mEq by mouth once daily.      SPIRIVA WITH HANDIHALER 18 mcg inhalation capsule Inhale 1 capsule (18 mcg total) into the lungs once daily. (Patient taking differently: Inhale 18 mcg into the lungs every evening. ) 30 capsule 11    tamsulosin (FLOMAX) 0.4 mg Cp24 Take 0.4 mg by mouth.      VIT A/VIT C/VIT E/ZINC/COPPER (PRESERVISION AREDS ORAL) Take 1 tablet by mouth 2 (two) times daily.       antiox.mv no.10-omeg3s-lut-herberth 280-10-2 mg Cap Take 1 tablet by mouth.      insulin glargine (LANTUS) 100 unit/mL injection Inject 10 Units into the skin 2 (two) times daily.       insulin glargine (LANTUS) 100 unit/mL injection Inject 15 Units into the skin.      ondansetron (ZOFRAN) 4 MG tablet Take 1 tablet (4 mg total) by mouth every 6 (six) hours. 12 tablet 0    oxyCODONE-acetaminophen (PERCOCET) 5-325 mg per tablet Take one or two every four hours as needed for bladder pain 60 tablet 0    promethazine (PHENERGAN) 12.5 MG Tab Take 1 tablet (12.5 mg total) by mouth every 6 (six) hours as needed. 20 tablet 1    [DISCONTINUED] atorvastatin (LIPITOR) 20 MG tablet Take 20 mg by mouth.      [DISCONTINUED] bumetanide (BUMEX) 2 MG tablet Take 2 mg by mouth.      [DISCONTINUED] cyanocobalamin (VITAMIN B-12) 1000 MCG tablet Inject 1,000 mcg into the skin.      [DISCONTINUED] glipiZIDE (GLUCOTROL) 2.5 MG TR24 Take 2.5 mg by mouth.      [DISCONTINUED] nitroGLYCERIN (NITROBID) 6.5 MG CpSR Take 6.5 mg by mouth.      [DISCONTINUED] potassium chloride (KLOR-CON) 10 MEQ TbSR Take 10 mEq by mouth.       No current facility-administered medications on file prior to visit.        Medications have been reviewed and reconciled with patient at this visit.  Barriers to medications present (no)    Adverse reactions to current medications (no)    Over the counter medications  reviewed (Yes ), and if needed added to active Medication list at this visit.     Exam:  Wt Readings from Last 3 Encounters:   11/05/18 93 kg (205 lb)   10/15/18 93.4 kg (205 lb 14.6 oz)   08/16/18 93.4 kg (205 lb 14.6 oz)     Temp Readings from Last 3 Encounters:   11/05/18 97.8 °F (36.6 °C) (Tympanic)   08/16/18 97.4 °F (36.3 °C) (Tympanic)   08/13/18 98.3 °F (36.8 °C)     BP Readings from Last 3 Encounters:   11/05/18 (!) 134/58   08/16/18 106/68   08/13/18 (!) 100/57     Pulse Readings from Last 3 Encounters:   11/05/18 70   08/16/18 72   08/13/18 79     Body mass index is 28.59 kg/m².      Review of Systems   Constitutional: Negative for chills and fever.   Respiratory: Positive for cough. Negative for shortness of breath.    Cardiovascular: Negative for chest pain and palpitations.   Genitourinary: Positive for hematuria. Negative for dysuria, flank pain and urgency.   Skin: Negative for rash.   Neurological: Negative for headaches.   Psychiatric/Behavioral: The patient has insomnia.      Physical Exam   Constitutional: He appears well-developed and well-nourished. No distress.   HENT:   Head: Normocephalic and atraumatic.   Eyes: Conjunctivae are normal.   Cardiovascular: Normal rate and regular rhythm. Exam reveals no gallop and no friction rub.   No murmur heard.  Pulmonary/Chest: Effort normal and breath sounds normal.   Neurological: He is alert.   Skin: Skin is warm and dry.   Psychiatric: He has a normal mood and affect.   Vitals reviewed.    Protective Sensation (w/ 10 gram monofilament):  Right: Absent  Left: Absent    Visual Inspection:  Normal -  Bilateral and Onychomycosis -  Bilateral    Pedal Pulses:   Right: Present  Left: Present    Posterior tibialis:   Right:Present  Left: Present      Laboratory Reviewed ({Yes)  Lab Results   Component Value Date    WBC 11.05 04/18/2018    HGB 11.2 (L) 04/18/2018    HCT 36.5 (L) 04/18/2018     04/18/2018    CHOL 90 04/24/2018    TRIG 149 11/02/2011     HDL 30 04/24/2018    ALT 7 (L) 12/31/2017    AST 16 12/31/2017     04/18/2018    K 3.7 04/18/2018    CL 99 04/18/2018    CREATININE 1.6 (H) 04/18/2018    BUN 28 04/18/2018    CO2 30 (H) 04/18/2018    TSH 3.57 11/01/2011    INR 1.2 06/28/2017    HGBA1C 7.3 (H) 04/17/2018       Rajiv was seen today for painful urination/blood in urine.    Diagnoses and all orders for this visit:    Type 2 diabetes mellitus without complication, without long-term current use of insulin  Comments:  status pending labs, continue current medications  Orders:  -     Hemoglobin A1c; Future    Insomnia, unspecified type  Comments:  stable, continue xanax    Gross hematuria  Comments:  will check UA and CBC today, will need urology follow up  Orders:  -     CBC auto differential; Future  -     Cancel: Urinalysis  -     Urinalysis; Future    CKD (chronic kidney disease) stage 3, GFR 30-59 ml/min  Comments:  status pending labs  Orders:  -     Basic metabolic panel; Future      Diabetic foot care discussed.  Hematuria likely related to bladder CA. Discussed with patient and son. They would like CBC to evaluate if blood loss significant.  UA shows blood, WBC, +nitrite, +protein. Will treat with renally dosed cipro.  Will need follow up after completing antibiotic.   Care Plan/Goals: Reviewed  (N/A)  Goals     None          Follow up: Follow-up in about 6 months (around 5/5/2019), or if symptoms worsen or fail to improve, for annual wellness/EPP with Dr. Rodriguez.    After visit summary was printed and given to patient upon discharge today.  Patient goals and care plan are included in After Visit Summary.

## 2018-11-05 NOTE — TELEPHONE ENCOUNTER
----- Message from Lyndsey Connors sent at 11/5/2018  9:54 AM CST -----  Contact: pt's daughter (Raven Darby)  Caller missed call back     [phone] (work)

## 2018-11-05 NOTE — TELEPHONE ENCOUNTER
----- Message from Arcelia Finney NP sent at 11/5/2018  4:08 PM CST -----  Please let patient and son know urine indicates infection. I have sent cipro to his pharmacy. He will need follow up after completing antibiotic.

## 2018-11-06 ENCOUNTER — TELEPHONE (OUTPATIENT)
Dept: INTERNAL MEDICINE | Facility: CLINIC | Age: 83
End: 2018-11-06

## 2018-11-06 NOTE — TELEPHONE ENCOUNTER
----- Message from Arcelia Finney NP sent at 11/6/2018  8:14 AM CST -----  Please let patient know anemia is slightly improved. Kidney function stable. A1c improved to 6.9 and goal is <7.

## 2018-11-07 LAB — BACTERIA UR CULT: NORMAL

## 2018-11-13 ENCOUNTER — OFFICE VISIT (OUTPATIENT)
Dept: CARDIOLOGY | Facility: CLINIC | Age: 83
End: 2018-11-13
Payer: MEDICARE

## 2018-11-13 ENCOUNTER — TELEPHONE (OUTPATIENT)
Dept: INTERNAL MEDICINE | Facility: CLINIC | Age: 83
End: 2018-11-13

## 2018-11-13 ENCOUNTER — OFFICE VISIT (OUTPATIENT)
Dept: INTERNAL MEDICINE | Facility: CLINIC | Age: 83
End: 2018-11-13
Payer: MEDICARE

## 2018-11-13 VITALS
HEIGHT: 71 IN | WEIGHT: 205 LBS | DIASTOLIC BLOOD PRESSURE: 56 MMHG | SYSTOLIC BLOOD PRESSURE: 98 MMHG | BODY MASS INDEX: 28.7 KG/M2 | HEART RATE: 70 BPM

## 2018-11-13 VITALS
WEIGHT: 205 LBS | SYSTOLIC BLOOD PRESSURE: 118 MMHG | OXYGEN SATURATION: 82 % | HEIGHT: 71 IN | HEART RATE: 68 BPM | BODY MASS INDEX: 28.7 KG/M2 | DIASTOLIC BLOOD PRESSURE: 76 MMHG

## 2018-11-13 DIAGNOSIS — I50.22 CHRONIC SYSTOLIC CHF (CONGESTIVE HEART FAILURE), NYHA CLASS 3: Chronic | ICD-10-CM

## 2018-11-13 DIAGNOSIS — I42.9 CARDIOMYOPATHY, UNSPECIFIED TYPE: ICD-10-CM

## 2018-11-13 DIAGNOSIS — E78.5 HYPERLIPIDEMIA ASSOCIATED WITH TYPE 2 DIABETES MELLITUS: ICD-10-CM

## 2018-11-13 DIAGNOSIS — J96.12 CHRONIC RESPIRATORY FAILURE WITH HYPOXIA AND HYPERCAPNIA: ICD-10-CM

## 2018-11-13 DIAGNOSIS — E11.69 HYPERLIPIDEMIA ASSOCIATED WITH TYPE 2 DIABETES MELLITUS: ICD-10-CM

## 2018-11-13 DIAGNOSIS — J44.9 MIXED TYPE COPD (CHRONIC OBSTRUCTIVE PULMONARY DISEASE): ICD-10-CM

## 2018-11-13 DIAGNOSIS — J96.11 CHRONIC RESPIRATORY FAILURE WITH HYPOXIA AND HYPERCAPNIA: ICD-10-CM

## 2018-11-13 DIAGNOSIS — E11.9 TYPE 2 DIABETES MELLITUS WITHOUT COMPLICATION, WITHOUT LONG-TERM CURRENT USE OF INSULIN: ICD-10-CM

## 2018-11-13 DIAGNOSIS — I10 ESSENTIAL HYPERTENSION: Primary | ICD-10-CM

## 2018-11-13 DIAGNOSIS — I25.118 CORONARY ARTERY DISEASE OF NATIVE ARTERY OF NATIVE HEART WITH STABLE ANGINA PECTORIS: ICD-10-CM

## 2018-11-13 DIAGNOSIS — I35.0 NONRHEUMATIC AORTIC VALVE STENOSIS: ICD-10-CM

## 2018-11-13 DIAGNOSIS — N18.30 CKD (CHRONIC KIDNEY DISEASE) STAGE 3, GFR 30-59 ML/MIN: ICD-10-CM

## 2018-11-13 DIAGNOSIS — N30.01 ACUTE CYSTITIS WITH HEMATURIA: Primary | ICD-10-CM

## 2018-11-13 DIAGNOSIS — I10 ESSENTIAL HYPERTENSION: ICD-10-CM

## 2018-11-13 DIAGNOSIS — E11.9 DIABETES MELLITUS TYPE 2 IN NONOBESE: ICD-10-CM

## 2018-11-13 LAB
BACTERIA #/AREA URNS HPF: ABNORMAL /HPF
BILIRUB UR QL STRIP: ABNORMAL
CLARITY UR: ABNORMAL
COLOR UR: ABNORMAL
GLUCOSE UR QL STRIP: ABNORMAL
HGB UR QL STRIP: ABNORMAL
KETONES UR QL STRIP: ABNORMAL
LEUKOCYTE ESTERASE UR QL STRIP: ABNORMAL
MICROSCOPIC COMMENT: ABNORMAL
NITRITE UR QL STRIP: ABNORMAL
PH UR STRIP: ABNORMAL [PH] (ref 5–8)
PROT UR QL STRIP: ABNORMAL
RBC #/AREA URNS HPF: >100 /HPF (ref 0–4)
SP GR UR STRIP: ABNORMAL (ref 1–1.03)
URN SPEC COLLECT METH UR: ABNORMAL

## 2018-11-13 PROCEDURE — 99213 OFFICE O/P EST LOW 20 MIN: CPT | Mod: HCNC,S$GLB,, | Performed by: NURSE PRACTITIONER

## 2018-11-13 PROCEDURE — 3288F FALL RISK ASSESSMENT DOCD: CPT | Mod: CPTII,HCNC,S$GLB, | Performed by: NURSE PRACTITIONER

## 2018-11-13 PROCEDURE — 99499 UNLISTED E&M SERVICE: CPT | Mod: S$GLB,,, | Performed by: NURSE PRACTITIONER

## 2018-11-13 PROCEDURE — 99999 PR PBB SHADOW E&M-EST. PATIENT-LVL V: CPT | Mod: PBBFAC,HCNC,, | Performed by: NURSE PRACTITIONER

## 2018-11-13 PROCEDURE — 1100F PTFALLS ASSESS-DOCD GE2>/YR: CPT | Mod: CPTII,HCNC,S$GLB, | Performed by: NURSE PRACTITIONER

## 2018-11-13 PROCEDURE — 81000 URINALYSIS NONAUTO W/SCOPE: CPT | Mod: HCNC

## 2018-11-13 PROCEDURE — 87086 URINE CULTURE/COLONY COUNT: CPT | Mod: HCNC

## 2018-11-13 NOTE — TELEPHONE ENCOUNTER
----- Message from Arcelia Finney NP sent at 11/13/2018 11:09 AM CST -----  Please notify that there appears to be more blood in urine but unable to determine infection. He should follow up with urology.

## 2018-11-13 NOTE — TELEPHONE ENCOUNTER
Patient notified of results and recommendations. Patient states he will have his daughter call tomorrow to schedule an appointment with the urologist. Advised to call the office as needed, patient verbalized understanding.

## 2018-11-13 NOTE — PROGRESS NOTES
Subjective:       Patient ID: Rajiv Russo is a 89 y.o. male.    Chief Complaint: followup UTI    Patient presents for follow up of UTI. He reports still having blood in urine. He completed cipro yesterday.       Review of Systems   Constitutional: Positive for fatigue. Negative for chills, fever and unexpected weight change.   Eyes: Negative for visual disturbance.   Respiratory: Negative for shortness of breath.    Cardiovascular: Negative for chest pain.   Genitourinary: Positive for hematuria. Negative for dysuria and frequency.   Musculoskeletal: Negative for myalgias.   Neurological: Positive for weakness. Negative for headaches.       Objective:      Physical Exam   Constitutional: He appears well-developed and well-nourished. No distress.   Neurological: He is alert.   Skin: He is not diaphoretic.   Psychiatric: He has a normal mood and affect.   Vitals reviewed.      Assessment:       1. Acute cystitis with hematuria    2. Essential hypertension        Plan:   Acute cystitis with hematuria  Comments:  blood in urine, needs follow up with urology due to history of bladder cancer  Orders:  -     Urinalysis  -     Urine culture    Essential hypertension  Comments:  BP on low side, overdue for cardiology follow up, will schedule to be seen today    Other orders  -     Urinalysis Microscopic        Follow-up if symptoms worsen or fail to improve.

## 2018-11-13 NOTE — PROGRESS NOTES
Subjective:   Patient ID:  Rajiv Russo is a 89 y.o. male who presents for evaluation of Hypertension      HPI   Mr. Russo is a 89 year old male with PMHx of HFrEF 40%, chronic combined systolic and diastolic HF, CAD, DM2, HTN, HLP, PPM who presents to clinic for BP check. He was seen by Primary care this AM and had low blood pressure on exam. He presents to clinic today and is doing well. Denies chest pain or anginal equivalents. He continues to live at home today with sitters around the clock for most of the day. Denies shortness of breath, BORGES or palpitations today. Trace edema to BLE today in office. He reports that daily weights have been around 196-199lbs at home lately. He is using supplemental oxygen at home. Denies syncope or near syncope. No orthopnea, PND or abdominal bloating today. NO CNS complaints to suggest TIA or CVA today. No signs of abnormal bleeding on ASA.     Past Medical History:   Diagnosis Date    Acute coronary syndrome     Bladder cancer     Bradycardia     CHF (congestive heart failure)     CKD (chronic kidney disease) stage 3, GFR 30-59 ml/min     Coronary artery disease     Diabetes mellitus     Heart attack     Hyperlipidemia     Hypertension     Macular degeneration     Pacemaker        Past Surgical History:   Procedure Laterality Date    BLADDER SURGERY      x2    CARDIAC PACEMAKER PLACEMENT      CATARACT EXTRACTION      colon removal.       EXCISION-BLADDER TUMOR-TRANSURETHRAL (TURBT) N/A 4/17/2018    Performed by John Jacobo IV, MD at [facility] OR       Social History     Tobacco Use    Smoking status: Former Smoker    Smokeless tobacco: Former User   Substance Use Topics    Alcohol use: No    Drug use: No       Family History   Problem Relation Age of Onset    No Known Problems Mother     No Known Problems Father     No Known Problems Sister     No Known Problems Brother     No Known Problems Maternal Aunt     No Known Problems Maternal Uncle      No Known Problems Paternal Aunt     No Known Problems Paternal Uncle     No Known Problems Maternal Grandmother     No Known Problems Maternal Grandfather     No Known Problems Paternal Grandmother     No Known Problems Paternal Grandfather     Amblyopia Neg Hx     Blindness Neg Hx     Cancer Neg Hx     Cataracts Neg Hx     Diabetes Neg Hx     Glaucoma Neg Hx     Hypertension Neg Hx     Macular degeneration Neg Hx     Retinal detachment Neg Hx     Strabismus Neg Hx     Stroke Neg Hx     Thyroid disease Neg Hx      Wt Readings from Last 3 Encounters:   11/13/18 93 kg (205 lb 0.4 oz)   11/13/18 93 kg (205 lb)   11/05/18 93 kg (205 lb)     Temp Readings from Last 3 Encounters:   11/05/18 97.8 °F (36.6 °C) (Tympanic)   08/16/18 97.4 °F (36.3 °C) (Tympanic)   08/13/18 98.3 °F (36.8 °C)     BP Readings from Last 3 Encounters:   11/13/18 118/76   11/13/18 (!) 98/56   11/05/18 (!) 134/58     Pulse Readings from Last 3 Encounters:   11/13/18 68   11/13/18 70   11/05/18 70         Review of Systems   Constitution: Negative for weakness and malaise/fatigue.   HENT: Negative for hearing loss and hoarse voice.    Eyes: Negative for blurred vision and visual disturbance.   Cardiovascular: Positive for leg swelling. Negative for chest pain, claudication, dyspnea on exertion, irregular heartbeat, near-syncope, orthopnea, palpitations, paroxysmal nocturnal dyspnea and syncope.   Respiratory: Negative for cough, hemoptysis, shortness of breath, sleep disturbances due to breathing, snoring and wheezing.    Endocrine: Negative for cold intolerance and heat intolerance.   Hematologic/Lymphatic: Does not bruise/bleed easily.   Skin: Negative for color change, dry skin and nail changes.   Musculoskeletal: Positive for arthritis. Negative for back pain, joint pain and myalgias.   Gastrointestinal: Negative for bloating, abdominal pain, constipation, nausea and vomiting.   Genitourinary: Negative for dysuria, flank  "pain, hematuria and hesitancy.   Neurological: Negative for headaches, light-headedness, loss of balance, numbness and paresthesias.   Psychiatric/Behavioral: Negative for altered mental status.   Allergic/Immunologic: Negative for environmental allergies.     /76 (BP Location: Left arm, Patient Position: Sitting)   Pulse 68   Ht 5' 11" (1.803 m)   Wt 93 kg (205 lb 0.4 oz)   SpO2 (!) 82%   BMI 28.60 kg/m²     Objective:   Physical Exam   Constitutional: He is oriented to person, place, and time. He appears well-developed and well-nourished. No distress.   HENT:   Head: Normocephalic and atraumatic.   Eyes: Pupils are equal, round, and reactive to light.   Neck: Normal range of motion and full passive range of motion without pain. Neck supple. No JVD present.   Cardiovascular: Normal rate, regular rhythm, S1 normal, S2 normal and intact distal pulses. PMI is not displaced. Exam reveals no distant heart sounds.   No murmur heard.  Pulses:       Radial pulses are 2+ on the right side, and 2+ on the left side.        Dorsalis pedis pulses are 2+ on the right side, and 2+ on the left side.   PPM site well healed   Pulmonary/Chest: Effort normal and breath sounds normal. No accessory muscle usage. No respiratory distress. He has no decreased breath sounds. He has no wheezes. He has no rales.   Abdominal: Soft. Bowel sounds are normal. He exhibits no distension. There is no tenderness.   Musculoskeletal: Normal range of motion. He exhibits no edema.        Right ankle: He exhibits swelling.        Left ankle: He exhibits swelling.   Trace BLE   Neurological: He is alert and oriented to person, place, and time.   Skin: Skin is warm and dry. He is not diaphoretic. No cyanosis. Nails show no clubbing.   Psychiatric: He has a normal mood and affect. His speech is normal and behavior is normal. Judgment and thought content normal. Cognition and memory are normal.   Nursing note and vitals reviewed.      Lab Results "   Component Value Date    CHOL 90 04/24/2018    CHOL 186 11/02/2011     Lab Results   Component Value Date    HDL 30 04/24/2018    HDL 45 11/02/2011     Lab Results   Component Value Date    LDLCALC 43 04/24/2018    LDLCALC 111.0 11/02/2011     Lab Results   Component Value Date    TRIG 149 11/02/2011     Lab Results   Component Value Date    CHOLHDL 24.2 11/02/2011       Chemistry        Component Value Date/Time     11/05/2018 1458    K 4.0 11/05/2018 1458    CL 93 (L) 11/05/2018 1458    CO2 37 (H) 11/05/2018 1458    BUN 33 (H) 11/05/2018 1458    CREATININE 1.7 (H) 11/05/2018 1458     (H) 11/05/2018 1458        Component Value Date/Time    CALCIUM 9.0 11/05/2018 1458    ALKPHOS 41 (L) 12/31/2017 1041    AST 16 12/31/2017 1041    ALT 7 (L) 12/31/2017 1041    BILITOT 0.8 12/31/2017 1041    ESTGFRAFRICA 37.7 (A) 11/05/2018 1458    EGFRNONAA 32.6 (A) 11/05/2018 1458          Lab Results   Component Value Date    TSH 3.57 11/01/2011     Lab Results   Component Value Date    INR 1.2 06/28/2017    INR 1.1 03/14/2017    INR 1.0 04/14/2016     Lab Results   Component Value Date    WBC 7.42 11/05/2018    HGB 12.2 (L) 11/05/2018    HCT 40.9 11/05/2018    MCV 92 11/05/2018     11/05/2018     2D ECHO TEST DESCRIPTION   Technical Quality: This is a technically challenging study. This study was performed in conjunction with a 3ml intravenous injection of Optison contrast agent because of poor endocardial visualization.     General: A catheter is present in the right-sided cardiac chambers.     Aorta: The aortic root is normal in size, measuring 2.1 cm at sinotubular junction and 2.9 cm at Sinuses of Valsalva. The proximal ascending aorta is normal in size, measuring 3.4 cm across.     Left Atrium: The left atrial volume index is moderately enlarged, measuring 45.41 cc/m2.     Left Ventricle: The left ventricle is normal in size, with an end-diastolic diameter of 4.7 cm, and an end-systolic diameter of 3.7 cm.  LV wall thickness is normal, with the septum measuring 1.2 cm and the posterior wall measuring 1.1 cm across. Relative   wall thickness was increased at 0.47, and the LV mass index was 110.4 g/m2 consistent with concentric remodeling. The following segments were moderately hypokinetic: mid inferior wall.  The following segments were severely hypokinetic: apical septum, mid inferoseptum, apical inferior wall.  Global left ventricular systolic function appears mildly to moderately depressed. Visually estimated ejection fraction is 40-45%. The LV Doppler derived stroke volume equals 50.0 ccs.       Right Atrium: The right atrium is normal in size, measuring 5.0 cm in length and 4.1 cm in width in the apical view.     Right Ventricle: The right ventricle is normal in size. Global right ventricular systolic function appears normal. Tricuspid annular plane systolic excursion (TAPSE) is 2.1 cm. The estimated PA systolic pressure is greater than 26 mmHg.     Aortic Valve:  The aortic valve is moderately sclerotic. The aortic valve is tri-leaflet in structure. The peak gradient obtained across the aortic valve is 27.0 mmHg, with a mean gradient of 18.0 mmHg. Using a left ventricular outflow tract diameter of   2.1 cm, a left ventricular outflow tract velocity time integral of 14 cm, and a peak instantaneous transvalvular velocity time integral of 63 cm, the calculated aortic valve area is 0.8 cm2, consistent with moderate to severe aortic stenosis.   Additionally, there is trivial aortic regurgitation.     Mitral Valve:  Mitral valve is normal in structure with normal leaflet mobility.     Tricuspid Valve:  Tricuspid valve is normal in structure with normal leaflet mobility.     Pulmonary Valve:  Pulmonary valve is normal in structure with normal leaflet mobility.     Intracavitary: There is no evidence of pericardial effusion, intracavity mass, thrombi, or vegetation.         CONCLUSIONS     1 - Moderate left atrial  enlargement.     2 - Concentric remodeling.     3 - Mildly to moderately depressed left ventricular systolic function (EF 40-45%).     4 - Normal right ventricular systolic function .     5 - The estimated PA systolic pressure is greater than 26 mmHg.     6 - Moderate to severe aortic stenosis, KASSIDY = 0.8 cm2, mean gradient = 18.0 mmHg.     7 - Trivial aortic regurgitation.     8 - Moderate left atrial enlargement.     9 - Concentric remodeling.     10 - Mildly to moderately depressed left ventricular systolic function (EF 40-45%).     11 - Normal right ventricular systolic function .     12 - The estimated PA systolic pressure is greater than 26 mmHg.     13 - Moderate to severe aortic stenosis, KASSIDY = 0.8 cm2, mean gradient = 18.0 mmHg.     14 - Trivial aortic regurgitation.             This document has been electronically    SIGNED BY: Praveen Sanchez MD On: 04/15/2016 15:19  Assessment:      1. Essential hypertension    2. Hyperlipidemia associated with type 2 diabetes mellitus    3. Nonrheumatic aortic valve stenosis    4. Cardiomyopathy, unspecified type    5. Coronary artery disease of native artery of native heart with stable angina pectoris    6. Chronic systolic CHF (congestive heart failure), NYHA class 3    7. Mixed type COPD (chronic obstructive pulmonary disease)    8. Chronic respiratory failure with hypoxia and hypercapnia    9. CKD (chronic kidney disease) stage 3, GFR 30-59 ml/min    10. Type 2 diabetes mellitus without complication, without long-term current use of insulin    11. Diabetes mellitus type 2 in nonobese    12. DM type 2, uncontrolled, with renal complications      Patient presents to clinic for BP check. He was seen in primary care today with BP on lower side.   Denies any lightheadedness or dizziness. No syncope or near syncopal events.   BP improved at time of exam.   NO CNS complaints to suggest TIA or CVA today.  No signs of abnormal bleeding on ASA.   Plan:     Will reach out to  Primary care for home health concerns  Continue same CV meds for now  May use Metolazone as needed  DASH diet, 2gm sodium restriction  1L fluid intake per day  Daily weights  IF increase in weight by 3 lbs in 1 day or 5 lbs in 1 week, call clinic.   Encourage physical activity as tolerated  Follow up in 1 year or sooner if needed.

## 2018-11-14 LAB — BACTERIA UR CULT: NORMAL

## 2018-11-15 ENCOUNTER — TELEPHONE (OUTPATIENT)
Dept: INTERNAL MEDICINE | Facility: CLINIC | Age: 83
End: 2018-11-15

## 2018-11-15 NOTE — TELEPHONE ENCOUNTER
----- Message from Arcelia Finney NP sent at 11/15/2018  4:34 PM CST -----  Please notify no growth in urine

## 2018-11-19 ENCOUNTER — TELEPHONE (OUTPATIENT)
Dept: UROLOGY | Facility: CLINIC | Age: 83
End: 2018-11-19

## 2018-11-19 NOTE — TELEPHONE ENCOUNTER
----- Message from Umm Bryant sent at 11/19/2018  8:38 AM CST -----  Contact: daughter/Raven  daughter states pt bleeding is getting worst, need to know if pt need to come in. Please call wife @ [phone]

## 2018-11-19 NOTE — TELEPHONE ENCOUNTER
----- Message from Nikki Stanley sent at 11/19/2018  9:43 AM CST -----  Contact: Elif, pt's Care Giver  She's calling to get a refill on pt's test strips, Prodigy, sent to [facility]-[phone], please advise [phone]

## 2018-11-19 NOTE — TELEPHONE ENCOUNTER
Spoke with Elif, informed that test strips were sent to [facility]. She verbalized understanding.

## 2018-11-20 ENCOUNTER — TELEPHONE (OUTPATIENT)
Dept: UROLOGY | Facility: CLINIC | Age: 83
End: 2018-11-20

## 2018-11-20 ENCOUNTER — OFFICE VISIT (OUTPATIENT)
Dept: UROLOGY | Facility: CLINIC | Age: 83
End: 2018-11-20
Payer: MEDICARE

## 2018-11-20 VITALS — WEIGHT: 205 LBS | HEIGHT: 71 IN | BODY MASS INDEX: 28.7 KG/M2

## 2018-11-20 DIAGNOSIS — C67.9 MALIGNANT NEOPLASM OF URINARY BLADDER, UNSPECIFIED SITE: Primary | ICD-10-CM

## 2018-11-20 PROCEDURE — 99499 UNLISTED E&M SERVICE: CPT | Mod: HCNC,S$GLB,, | Performed by: UROLOGY

## 2018-11-20 PROCEDURE — 99499 UNLISTED E&M SERVICE: CPT | Mod: S$GLB,,, | Performed by: UROLOGY

## 2018-11-20 PROCEDURE — 52000 CYSTOURETHROSCOPY: CPT | Mod: HCNC,S$GLB,, | Performed by: UROLOGY

## 2018-11-20 PROCEDURE — 99999 PR PBB SHADOW E&M-EST. PATIENT-LVL III: CPT | Mod: PBBFAC,HCNC,, | Performed by: UROLOGY

## 2018-11-20 RX ORDER — PROMETHAZINE HYDROCHLORIDE 12.5 MG/1
12.5 TABLET ORAL EVERY 6 HOURS PRN
Qty: 20 TABLET | Refills: 1 | Status: SHIPPED | OUTPATIENT
Start: 2018-11-20

## 2018-11-20 RX ORDER — PROMETHAZINE HYDROCHLORIDE 12.5 MG/1
12.5 TABLET ORAL EVERY 6 HOURS PRN
Qty: 20 TABLET | Refills: 1 | Status: SHIPPED | OUTPATIENT
Start: 2018-11-20 | End: 2018-11-20 | Stop reason: SDUPTHER

## 2018-11-20 NOTE — TELEPHONE ENCOUNTER
----- Message from Chelsi Fragoso sent at 11/20/2018  3:00 PM CST -----  Contact: pt son  Calling in regards to send RX to [facility] in [place] and please advise [phone] or [phone] (home) [phone] (work)

## 2018-11-20 NOTE — TELEPHONE ENCOUNTER
Spoke with pt to let him know that phenargan Rx was sent to [facility]. He verbalized understanding.

## 2018-11-20 NOTE — PROGRESS NOTES
Chief Complaint: T2 Bladder Cancer    HPI:   11/20/18: Gross hematuria has arisen again.  Cysto to check on dome and right trigone.  Mass has recurred at anterior bladder.  Having a little gross hematuria every morning.  10/15/18: XRT completed, here for surveillance cystoscopy.  White shaggy mass at dome and right trigone.   6/11/18: Cysto today shows recurrent large anterior bladder mass.  5/10/18: TURBT complete, arboleda out, voiding okay.  Having OAB symptoms we reviewed in detail.  4/1/18: US suggests a bladder mass. Cysto confirms. Likely complicaiton of prior XRT.  3/13/18: 89 yo man had bladder cancer dx 40 years ago and Dr. Andersen in [place] looked in his bladder a year or two; last time a bladder lesion was seen was 15-20 years ago.  Had XRT for prostate cancer early 80's.  Had gross hematuria recently and was sent for evaluation of that.  Has had a lot of UCx sent over last year never had a UTI.  No abd/pelvic pain and no exac/rel factors.  No urolithiasis.  No urinary bother other than frequency.  No  history.  Normal sexual function.    Allergies:  Patient has no known allergies.    Medications:  has a current medication list which includes the following prescription(s): alprazolam, antiox. [id number], aspirin, atorvastatin, bd ultra-fine mini pen needle, blood sugar diagnostic, bumetanide, cyanocobalamin, docusate sodium, fenofibrate, glipizide, humalog kwikpen insulin, hydrocortisone, insulin glargine, insulin glargine, metolazone, metoprolol succinate, mupirocin, nitroglycerin, ondansetron, oxycodone-acetaminophen, potassium chloride, promethazine, spiriva with handihaler, tamsulosin, and vit a/vit c/vit e/zinc/copper.    Review of Systems:  General: No fever, chills, fatigability, or weight loss.  Skin: No rashes, itching, or changes in color or texture of skin.  Chest: Denies BORGES, cyanosis, wheezing, cough, and sputum production.  Abdomen: Appetite fine. No weight loss. Denies diarrhea,  abdominal pain, hematemesis, or blood in stool.  Musculoskeletal: No joint stiffness or swelling. Denies back pain.  : As above.  All other review of systems negative.    PMH:   has a past medical history of Acute coronary syndrome, Bladder cancer, Bradycardia, CHF (congestive heart failure), CKD (chronic kidney disease) stage 3, GFR 30-59 ml/min, Coronary artery disease, Diabetes mellitus, Heart attack, Hyperlipidemia, Hypertension, Macular degeneration, and Pacemaker.    PSH:   has a past surgical history that includes colon removal. ; Bladder surgery; Cataract extraction; Cardiac pacemaker placement; and EXCISION-BLADDER TUMOR-TRANSURETHRAL (TURBT) (N/A, 4/17/2018).    FamHx: family history includes No Known Problems in his brother, father, maternal aunt, maternal grandfather, maternal grandmother, maternal uncle, mother, paternal aunt, paternal grandfather, paternal grandmother, paternal uncle, and sister.    SocHx:  reports that he has quit smoking. He has quit using smokeless tobacco. He reports that he does not drink alcohol or use drugs.      Physical Exam:  There were no vitals filed for this visit.  General: A&Ox3, no apparent distress, no deformities  Neck: No masses, normal thyroid  Lungs: normal inspiration, no use of accessory muscles  Heart: normal pulse, no arrhythmias  Abdomen: Soft, NT, ND  Skin: The skin is warm and dry. No jaundice.  Ext: No c/c/e.  :   4/18: Test desc mehdi, no abnormalities of epididymus. Penis normal, with normal penile and scrotal skin. Meatus normal.     Labs/Studies:   Bladder Scan performed in office:     4/18: PVR 0 ml.    Procedure: Diagnostic Cystoscopy    Procedure in Detail: After proper consents were obtained, the patient was prepped and draped in normal sterile fashion for diagnostic cystoscopy. 5 ml of lidocaine jelly was instilled in the urethra. The flexible cystoscope was then introduced into the urethra, and advanced into the bladder under direct vision. The  urethral mucosa appeared normal, and no strictures were noted. The sphincter appeared to be normal, and the veru montanum was unremarkable. The prostatic mucosa and the lateral lobes of the prostate were normal. The bladder neck was normal. Inspection of the interior of the bladder was then carried out. The trigone was unremarkable, with no mucosal lesions. The ureteral orifices were normal in position and configuration. Systematic inspection of the mucosa of the bladder it was then carried out, rotating the cystoscope so that all areas of the left and right lateral walls, the dome of the bladder, and the posterior wall were all visualized. The cystoscope was then advanced further into the bladder, and maximum deflection of the scope was performed so that the bladder neck could be inspected. No mucosal lesions were noted there. The cystoscope was then removed, and the procedure terminated.     Findings: recurrent TCC at dome, rest of bladder normal    Impression/Plan:   1. T2 bladder cancer, unfavorable for cystoprostatectomy (age, prior XRT, cardiac condition).  Uncurable most likely and prognosis dim.  Anesthesia risk very high.  2. If outlet obstruction occurs in an inoperable fashion would move to bilateral nephrostomy and not SP tube.  3. Plan was to manage expectantly.  Discussed recurrent mass with pt and family.  Recc is for home hospice and will set pt up with Dr. Mcdermott for his support in this.  RTC 1 mo to discuss as needed.

## 2018-11-21 ENCOUNTER — TELEPHONE (OUTPATIENT)
Dept: HEMATOLOGY/ONCOLOGY | Facility: CLINIC | Age: 83
End: 2018-11-21

## 2018-11-21 NOTE — TELEPHONE ENCOUNTER
Dr. Burch and I spoke with patients daughterRaven scheduled appointment with Dr. Burch this Friday to discuss hospice. Confirmed appointment time and location.

## 2018-11-28 ENCOUNTER — PATIENT MESSAGE (OUTPATIENT)
Dept: INTERNAL MEDICINE | Facility: CLINIC | Age: 83
End: 2018-11-28

## 2018-11-30 NOTE — TELEPHONE ENCOUNTER
Please contact daughter/family. She sent [employer] message notifying me that her father passed away. I tried to reply to her, but it wouldn't let me. Please extend my condolences on her Father's passing. It was my pleasure to be his primary care physician. Thank you.

## 2019-02-10 DIAGNOSIS — I50.22 CHRONIC SYSTOLIC CHF (CONGESTIVE HEART FAILURE): ICD-10-CM

## 2019-02-10 RX ORDER — METOPROLOL SUCCINATE 25 MG/1
TABLET, EXTENDED RELEASE ORAL
Qty: 90 TABLET | Refills: 3 | OUTPATIENT
Start: 2019-02-10

## 2019-02-10 RX ORDER — BUMETANIDE 2 MG/1
TABLET ORAL
Qty: 54 TABLET | Refills: 2 | OUTPATIENT
Start: 2019-02-10

## 2022-04-11 NOTE — TELEPHONE ENCOUNTER
Follow-up in 1 month for lab follow-up and to discuss asthma   pts daughter called and said pt is saying he had his pacemaker checked last in the hospital  He said [facility] told him he did not need to get it checked Tomorrow 4/20.   Please advise, last ppm check i see is for October, 2016

## 2023-04-21 NOTE — PROGRESS NOTES
Subjective:      Rajiv Russo is a 89 y.o. male POD# 1 s/p TURBT.     Diet:  reg, no nausea, some flatus  Pain well controlled   Ambulating a bit     Objective:     Temp:  [98 °F (36.7 °C)-99.7 °F (37.6 °C)] 99.7 °F (37.6 °C)  Pulse:  [68-71] 71  Resp:  [11-20] 18  SpO2:  [91 %-100 %] 95 %  BP: (108-169)/(52-73) 108/52  I/O last 3 completed shifts:  In: 2316.7 [I.V.:2166.7; IV Piggyback:150]  Out: 1005 [Urine:1000; Blood:5]       Gen WDWN in NAD  Resp non-labored  CV Regular  Abd soft, non-distended  Zarate with pink urine      Labs:     Recent Labs  Lab 04/18/18  0532      K 3.7   CL 99   CO2 30*   BUN 28   CREATININE 1.6*   CALCIUM 8.5*       Recent Labs  Lab 04/18/18  0532   WBC 11.05   HGB 11.2*   HCT 36.5*          Assessment/Plan:     1.  Ambulate qid in hallways  2.  D/c home; rtc Monday        John Jacobo IV, MD       Sharmila Egan

## 2024-05-08 NOTE — ED PROVIDER NOTES
Pt anxious/tearful. PRN atarax given at this time.    SCRIBE #1 NOTE: I, Robin Simon, am scribing for, and in the presence of, Radha Medel MD. I have scribed the entire note.      History      Chief Complaint   Patient presents with    Abscess     boil to right leg and is spreading, redness to RLE-       Review of patient's allergies indicates:  No Known Allergies     HPI   HPI    3/21/2017, 12:45 PM   History obtained from the patient and son      History of Present Illness: Rajiv Russo is a 89 y.o. male patient who presents to the Emergency Department for abscess which onset gradually one week ago. Sxs are constant and moderate in severity. Abscess located to right shin. Son reports pt was seen in ED last week, was admitted for cellulitis, and started on Zyvox. There are no mitigating or exacerbating factors noted. Associated sxs include RLE redness. Pt denies any N/V/D, HA, SOB, CP, numbness, calf pain, and all other sxs at this time. Prior tx includes Abx as Rx. Pt had abscess I&D at PCP yesterday. Pt was sent from Dr. Ledbetter's office today. No further complaints or concerns at this time.       Arrival mode: Personal vehicle      PCP: Dean Soto Jr, MD       Past Medical History:  Past Medical History:   Diagnosis Date    Acute coronary syndrome     Bladder cancer     Bradycardia     CHF (congestive heart failure)     CKD (chronic kidney disease) stage 3, GFR 30-59 ml/min     Coronary artery disease     Diabetes mellitus     Heart attack     Hyperlipidemia     Hypertension     Macular degeneration     Pacemaker        Past Surgical History:  Past Surgical History:   Procedure Laterality Date    BLADDER SURGERY      CARDIAC PACEMAKER PLACEMENT      CATARACT EXTRACTION      colon removal.            Family History:  Family History   Problem Relation Age of Onset    No Known Problems Mother     No Known Problems Father     No Known Problems Sister     No Known Problems Brother     No Known Problems Maternal Aunt      No Known Problems Maternal Uncle     No Known Problems Paternal Aunt     No Known Problems Paternal Uncle     No Known Problems Maternal Grandmother     No Known Problems Maternal Grandfather     No Known Problems Paternal Grandmother     No Known Problems Paternal Grandfather     Amblyopia Neg Hx     Blindness Neg Hx     Cancer Neg Hx     Cataracts Neg Hx     Diabetes Neg Hx     Glaucoma Neg Hx     Hypertension Neg Hx     Macular degeneration Neg Hx     Retinal detachment Neg Hx     Strabismus Neg Hx     Stroke Neg Hx     Thyroid disease Neg Hx        Social History:  Social History     Social History Main Topics    Smoking status: Former Smoker    Smokeless tobacco: Unknown    Alcohol use No    Drug use: No    Sexual activity: Unknown       ROS   Review of Systems   Constitutional: Negative for fever.   HENT: Negative for sore throat.    Respiratory: Negative for shortness of breath.    Cardiovascular: Negative for chest pain.   Gastrointestinal: Negative for abdominal pain, diarrhea, nausea and vomiting.   Genitourinary: Negative for dysuria.   Musculoskeletal: Negative for back pain.   Skin: Positive for color change (RLE redness) and wound (abscess to R shin).   Neurological: Negative for dizziness, syncope, weakness, numbness and headaches.   Hematological: Does not bruise/bleed easily.       Physical Exam    Initial Vitals   BP Pulse Resp Temp SpO2   03/21/17 1107 03/21/17 1107 03/21/17 1107 03/21/17 1107 03/21/17 1107   105/55 79 18 98.1 °F (36.7 °C) 91 %      Physical Exam  Nursing Notes and Vital Signs Reviewed.  Constitutional: Patient is in no acute distress. Awake and alert. Well-developed and well-nourished.  Head: Atraumatic. Normocephalic.  Eyes: PERRL. EOM intact. Conjunctivae are not pale. No scleral icterus.  ENT: Mucous membranes are moist. Oropharynx is clear and symmetric.    Neck: Supple. Full ROM. No lymphadenopathy.  Cardiovascular: Regular rate. Regular rhythm. No  "murmurs, rubs, or gallops. Distal pulses are 2+ and symmetric.  Pulmonary/Chest: No respiratory distress. Faint expiratory wheezing. No rales, or rhonchi.  Abdominal: Soft and non-distended. There is no tenderness. No rebound, guarding, or rigidity. Good bowel sounds.  Musculoskeletal: Moves all extremities. No obvious deformities. No edema. No calf tenderness.  Skin: Warm and dry. Erythema and warmth to right distal shin consistent with cellulitis with small area of fluctuance and drainage noted, no induration. NVI distally.   Neurological:  Alert, awake, and appropriate.  Normal speech.  No acute focal neurological deficits are appreciated.  Psychiatric: Normal affect. Good eye contact. Appropriate in content.    ED Course    I & D - Incision and Drainage  Date/Time: 3/21/2017 1:38 PM  Performed by: LIZZIE MARRERO  Authorized by: LIZZIE MARRERO   Type: abscess  Body area: lower extremity  Location details: right leg  Anesthesia: local infiltration    Anesthesia:  Anesthesia: local infiltration  Local Anesthetic: lidocaine 1% without epinephrine   Patient sedated: no  Scalpel size: 11  Incision type: single straight  Complexity: simple  Drainage: purulent and  serous  Drainage amount: moderate  Wound treatment: incision,  drainage,  expression of material and  wound packed  Packing material: 1/4 in gauze  Patient tolerance: Patient tolerated the procedure well with no immediate complications        ED Vital Signs:  Vitals:    03/21/17 1107 03/21/17 1300 03/21/17 1305 03/21/17 1345   BP: (!) 105/55 135/61  (!) 128/56   Pulse: 79 76 73 73   Resp: 18 19  15   Temp: 98.1 °F (36.7 °C)      TempSrc: Oral      SpO2: (!) 91% 100%  96%   Weight: 94.3 kg (208 lb)      Height: 5' 11" (1.803 m)       03/21/17 1405 03/21/17 1451   BP: 132/68 (!) 122/53   Pulse: 69 80   Resp: 16 18   Temp: 98 °F (36.7 °C)    TempSrc: Oral    SpO2: 97% 97%   Weight:     Height:         Abnormal Lab Results:  Labs Reviewed   CBC W/ " AUTO DIFFERENTIAL - Abnormal; Notable for the following:        Result Value    RBC 3.90 (*)     Hemoglobin 11.1 (*)     Hematocrit 35.1 (*)     MCHC 31.6 (*)     Lymph% 14.2 (*)     All other components within normal limits   COMPREHENSIVE METABOLIC PANEL - Abnormal; Notable for the following:     Glucose 201 (*)     Creatinine 1.6 (*)     Albumin 3.2 (*)     Alkaline Phosphatase 51 (*)     ALT 9 (*)     eGFR if  41 (*)     eGFR if non  35 (*)     All other components within normal limits   CULTURE, BLOOD   CULTURE, BLOOD   LACTIC ACID, PLASMA        All Lab Results:  Results for orders placed or performed during the hospital encounter of 03/21/17   Blood Culture #2 **CANNOT BE ORDERED STAT**   Result Value Ref Range    Blood Culture, Routine No Growth to date    CBC auto differential   Result Value Ref Range    WBC 7.45 3.90 - 12.70 K/uL    RBC 3.90 (L) 4.60 - 6.20 M/uL    Hemoglobin 11.1 (L) 14.0 - 18.0 g/dL    Hematocrit 35.1 (L) 40.0 - 54.0 %    MCV 90 82 - 98 fL    MCH 28.5 27.0 - 31.0 pg    MCHC 31.6 (L) 32.0 - 36.0 %    RDW 13.7 11.5 - 14.5 %    Platelets 270 150 - 350 K/uL    MPV 9.2 9.2 - 12.9 fL    Gran # 5.4 1.8 - 7.7 K/uL    Lymph # 1.1 1.0 - 4.8 K/uL    Mono # 0.8 0.3 - 1.0 K/uL    Eos # 0.2 0.0 - 0.5 K/uL    Baso # 0.01 0.00 - 0.20 K/uL    Gran% 72.2 38.0 - 73.0 %    Lymph% 14.2 (L) 18.0 - 48.0 %    Mono% 10.3 4.0 - 15.0 %    Eosinophil% 3.2 0.0 - 8.0 %    Basophil% 0.1 0.0 - 1.9 %    Differential Method Automated    Comprehensive metabolic panel   Result Value Ref Range    Sodium 137 136 - 145 mmol/L    Potassium 4.0 3.5 - 5.1 mmol/L    Chloride 99 95 - 110 mmol/L    CO2 29 23 - 29 mmol/L    Glucose 201 (H) 70 - 110 mg/dL    BUN, Bld 26 10 - 30 mg/dL    Creatinine 1.6 (H) 0.5 - 1.4 mg/dL    Calcium 9.2 8.7 - 10.5 mg/dL    Total Protein 7.0 6.0 - 8.4 g/dL    Albumin 3.2 (L) 3.5 - 5.2 g/dL    Total Bilirubin 0.4 0.1 - 1.0 mg/dL    Alkaline Phosphatase 51 (L) 55 - 135 U/L     AST 17 10 - 40 U/L    ALT 9 (L) 10 - 44 U/L    Anion Gap 9 8 - 16 mmol/L    eGFR if African American 41 (A) >60 mL/min/1.73 m^2    eGFR if non African American 35 (A) >60 mL/min/1.73 m^2   Lactic acid, plasma   Result Value Ref Range    Lactate (Lactic Acid) 1.1 0.5 - 2.2 mmol/L         Imaging Results:  Imaging Results         X-Ray Chest AP Portable (Final result) Result time:  03/21/17 13:04:10    Final result by Antwon Dietz MD (03/21/17 13:04:10)    Impression:        Minor bibasilar discoid atelectasis.      Electronically signed by: ANTWON DIETZ MD  Date:     03/21/17  Time:    13:04     Narrative:    Procedure: XR CHEST AP PORTABLE, 03/21/17 13:00:45    Clinical indication:  Hypoxia.  Shortness of breath.    Findings: Comparison with 03/14/2017.  Left pacemaker.  Cardiomegaly.  Bibasilar discoid atelectasis or scarring.  Slight increase in the degree of atelectasis particularly at the left base is noted.                   The Emergency Provider reviewed the vital signs and test results, which are outlined above.    ED Discussion     2:46 PM: Dr. Medel discussed the pt's case with Dr. Ledbetter ([facility] medicine) who recommends pt can f/u as outpatient and continue taking ZYVOX as Rx if pt is comfortable with plan of discharge.    2:46 PM: Reassessed pt. Pt states their condition has improved at this time. Pt expresses desire to be discharged. Discussed with pt all pertinent ED information and results. Discussed plan of treatment with pt. Gave pt all f/u and return to the ED instructions. All questions and concerns were addressed at this time. Pt understands and agrees to plan as discussed. Pt is stable for discharge.     I discussed wound care precautions with patient and/or family/caretaker; specifically that all wounds have risk of infection despite efforts to cleanse and debride the wound; and there is a risk of an occult foreign body (and thus increased risk of infection) despite a  negative examination.  I discussed with patient need to return for any signs of infection, specifically redness, increased pain, fever, drainage of pus, or any concern, immediately.    ED Medication(s):  Medications   lidocaine HCL 10 mg/ml (1%) injection 10 mL (10 mLs Infiltration Given by Other 3/21/17 1305)   hydrocodone-acetaminophen 10-325mg per tablet 1 tablet (1 tablet Oral Given 3/21/17 1345)       Discharge Medication List as of 3/21/2017  2:45 PM          Follow-up Information     Follow up with Wyatt Ledbetter MD. Schedule an appointment as soon as possible for a visit in 2 days.    Specialty:  Infectious Diseases    Why:  Return to the Emergency Room, If symptoms worsen    Contact information:    [address] [facility] [address] [phone]              Medical Decision Making    Medical Decision Making:   Clinical Tests:   Lab Tests: Reviewed and Ordered  Radiological Study: Ordered and Reviewed           Scribe Attestation:   Scribe #1: I performed the above scribed service and the documentation accurately describes the services I performed. I attest to the accuracy of the note.    Attending:   Physician Attestation Statement for Scribe #1: I, Radha Medel MD, personally performed the services described in this documentation, as scribed by Robin Simon, in my presence, and it is both accurate and complete.          Clinical Impression       ICD-10-CM ICD-9-CM   1. Cellulitis of right leg L03.115 682.6         3. Abscess of right leg L02.415 682.6       Disposition:   Disposition: Discharged  Condition: Stable         Radha Medel MD  03/22/17 0608

## (undated) DEVICE — GOWN SMARTGOWN LVL4 X-LONG XL

## (undated) DEVICE — BAG DRAIN URINARY CONT IRRG

## (undated) DEVICE — SOL WATER STRL IRR 1000ML

## (undated) DEVICE — CATH FOLLOWER HEYMAN 16FR

## (undated) DEVICE — SEE MEDLINE ITEM 152622

## (undated) DEVICE — SUPPORT ULNA NERVE PROTECTOR

## (undated) DEVICE — GLOVE PROTEXIS HYDROGEL SZ8

## (undated) DEVICE — SEE MEDLINE ITEM 152487

## (undated) DEVICE — CANISTER SUCTION MEDI-VAC 12L

## (undated) DEVICE — SYR 50ML CATH TIP

## (undated) DEVICE — SYR 10CC LUER LOCK

## (undated) DEVICE — SEE MEDLINE ITEM 157185

## (undated) DEVICE — GUIDEWIRE AMPLATZ .035X145CM

## (undated) DEVICE — MANIFOLD 4 PORT

## (undated) DEVICE — SOL IRR NACL .9% 3000ML

## (undated) DEVICE — SEE MEDLINE ITEM 154981

## (undated) DEVICE — GAUZE SPONGE 4X4 12PLY

## (undated) DEVICE — CATH FOLLOWER HEYMAN 22FR

## (undated) DEVICE — SKIN MARKER DEVON 160

## (undated) DEVICE — ELECTRODE LOOP CUTTING BIPOLAR

## (undated) DEVICE — Device

## (undated) DEVICE — CATH FOLLOWER HEYMAN 14FR

## (undated) DEVICE — SET IRRIGATION WARMING NORMAL

## (undated) DEVICE — UROVIEW 2600/2800

## (undated) DEVICE — CONTAINER SPECIMEN STRL 4OZ